# Patient Record
Sex: MALE | Race: WHITE | NOT HISPANIC OR LATINO | Employment: FULL TIME | URBAN - METROPOLITAN AREA
[De-identification: names, ages, dates, MRNs, and addresses within clinical notes are randomized per-mention and may not be internally consistent; named-entity substitution may affect disease eponyms.]

---

## 2018-10-30 ENCOUNTER — TRANSFERRED RECORDS (OUTPATIENT)
Dept: MULTI SPECIALTY CLINIC | Facility: CLINIC | Age: 53
End: 2018-10-30

## 2021-05-27 ENCOUNTER — RECORDS - HEALTHEAST (OUTPATIENT)
Dept: ADMINISTRATIVE | Facility: CLINIC | Age: 56
End: 2021-05-27

## 2021-05-28 ENCOUNTER — RECORDS - HEALTHEAST (OUTPATIENT)
Dept: ADMINISTRATIVE | Facility: CLINIC | Age: 56
End: 2021-05-28

## 2022-08-21 ENCOUNTER — HOSPITAL ENCOUNTER (INPATIENT)
Facility: CLINIC | Age: 57
LOS: 3 days | Discharge: ANOTHER HOSPITAL | End: 2022-08-24
Attending: STUDENT IN AN ORGANIZED HEALTH CARE EDUCATION/TRAINING PROGRAM | Admitting: INTERNAL MEDICINE
Payer: COMMERCIAL

## 2022-08-21 ENCOUNTER — APPOINTMENT (OUTPATIENT)
Dept: CT IMAGING | Facility: CLINIC | Age: 57
End: 2022-08-21
Attending: STUDENT IN AN ORGANIZED HEALTH CARE EDUCATION/TRAINING PROGRAM
Payer: COMMERCIAL

## 2022-08-21 DIAGNOSIS — I10 ESSENTIAL HYPERTENSION: Primary | ICD-10-CM

## 2022-08-21 DIAGNOSIS — J18.9 PNEUMONIA DUE TO INFECTIOUS ORGANISM, UNSPECIFIED LATERALITY, UNSPECIFIED PART OF LUNG: ICD-10-CM

## 2022-08-21 DIAGNOSIS — I21.4 NSTEMI (NON-ST ELEVATED MYOCARDIAL INFARCTION) (H): ICD-10-CM

## 2022-08-21 PROBLEM — J85.2 LUNG ABSCESS (H): Status: ACTIVE | Noted: 2022-08-21

## 2022-08-21 LAB
ALBUMIN SERPL-MCNC: 4.1 G/DL (ref 3.5–5)
ALP SERPL-CCNC: 54 U/L (ref 45–120)
ALT SERPL W P-5'-P-CCNC: 28 U/L (ref 0–45)
ANION GAP SERPL CALCULATED.3IONS-SCNC: 5 MMOL/L (ref 5–18)
AST SERPL W P-5'-P-CCNC: 20 U/L (ref 0–40)
BASOPHILS # BLD AUTO: 0.1 10E3/UL (ref 0–0.2)
BASOPHILS NFR BLD AUTO: 1 %
BILIRUB SERPL-MCNC: 2 MG/DL (ref 0–1)
BNP SERPL-MCNC: 38 PG/ML (ref 0–48)
BUN SERPL-MCNC: 14 MG/DL (ref 8–22)
C REACTIVE PROTEIN LHE: <0.1 MG/DL (ref 0–?)
CALCIUM SERPL-MCNC: 9.5 MG/DL (ref 8.5–10.5)
CHLORIDE BLD-SCNC: 106 MMOL/L (ref 98–107)
CO2 SERPL-SCNC: 28 MMOL/L (ref 22–31)
CREAT SERPL-MCNC: 1.12 MG/DL (ref 0.7–1.3)
EOSINOPHIL # BLD AUTO: 0.1 10E3/UL (ref 0–0.7)
EOSINOPHIL NFR BLD AUTO: 1 %
ERYTHROCYTE [DISTWIDTH] IN BLOOD BY AUTOMATED COUNT: 13.1 % (ref 10–15)
FLUAV RNA SPEC QL NAA+PROBE: NEGATIVE
FLUBV RNA RESP QL NAA+PROBE: NEGATIVE
GFR SERPL CREATININE-BSD FRML MDRD: 77 ML/MIN/1.73M2
GLUCOSE BLD-MCNC: 98 MG/DL (ref 70–125)
HCT VFR BLD AUTO: 44.6 % (ref 40–53)
HGB BLD-MCNC: 16 G/DL (ref 13.3–17.7)
HOLD SPECIMEN: NORMAL
IMM GRANULOCYTES # BLD: 0 10E3/UL
IMM GRANULOCYTES NFR BLD: 0 %
LYMPHOCYTES # BLD AUTO: 1.3 10E3/UL (ref 0.8–5.3)
LYMPHOCYTES NFR BLD AUTO: 20 %
MAGNESIUM SERPL-MCNC: 1.9 MG/DL (ref 1.8–2.6)
MCH RBC QN AUTO: 30.8 PG (ref 26.5–33)
MCHC RBC AUTO-ENTMCNC: 35.9 G/DL (ref 31.5–36.5)
MCV RBC AUTO: 86 FL (ref 78–100)
MONOCYTES # BLD AUTO: 0.6 10E3/UL (ref 0–1.3)
MONOCYTES NFR BLD AUTO: 8 %
NEUTROPHILS # BLD AUTO: 4.5 10E3/UL (ref 1.6–8.3)
NEUTROPHILS NFR BLD AUTO: 70 %
NRBC # BLD AUTO: 0 10E3/UL
NRBC BLD AUTO-RTO: 0 /100
PLATELET # BLD AUTO: 234 10E3/UL (ref 150–450)
POTASSIUM BLD-SCNC: 3.9 MMOL/L (ref 3.5–5)
PROT SERPL-MCNC: 7.3 G/DL (ref 6–8)
RBC # BLD AUTO: 5.2 10E6/UL (ref 4.4–5.9)
RSV RNA SPEC NAA+PROBE: NEGATIVE
SARS-COV-2 RNA RESP QL NAA+PROBE: POSITIVE
SODIUM SERPL-SCNC: 139 MMOL/L (ref 136–145)
TROPONIN I SERPL-MCNC: 0.1 NG/ML (ref 0–0.29)
TROPONIN I SERPL-MCNC: 0.13 NG/ML (ref 0–0.29)
WBC # BLD AUTO: 6.6 10E3/UL (ref 4–11)

## 2022-08-21 PROCEDURE — 85014 HEMATOCRIT: CPT | Performed by: STUDENT IN AN ORGANIZED HEALTH CARE EDUCATION/TRAINING PROGRAM

## 2022-08-21 PROCEDURE — 258N000003 HC RX IP 258 OP 636: Performed by: STUDENT IN AN ORGANIZED HEALTH CARE EDUCATION/TRAINING PROGRAM

## 2022-08-21 PROCEDURE — 258N000003 HC RX IP 258 OP 636: Performed by: INTERNAL MEDICINE

## 2022-08-21 PROCEDURE — C9803 HOPD COVID-19 SPEC COLLECT: HCPCS

## 2022-08-21 PROCEDURE — 210N000002 HC R&B HEART CARE

## 2022-08-21 PROCEDURE — 99223 1ST HOSP IP/OBS HIGH 75: CPT | Performed by: INTERNAL MEDICINE

## 2022-08-21 PROCEDURE — 99285 EMERGENCY DEPT VISIT HI MDM: CPT | Mod: CS,25

## 2022-08-21 PROCEDURE — 36415 COLL VENOUS BLD VENIPUNCTURE: CPT | Performed by: STUDENT IN AN ORGANIZED HEALTH CARE EDUCATION/TRAINING PROGRAM

## 2022-08-21 PROCEDURE — 96365 THER/PROPH/DIAG IV INF INIT: CPT | Mod: 59

## 2022-08-21 PROCEDURE — 71275 CT ANGIOGRAPHY CHEST: CPT

## 2022-08-21 PROCEDURE — 80053 COMPREHEN METABOLIC PANEL: CPT | Performed by: STUDENT IN AN ORGANIZED HEALTH CARE EDUCATION/TRAINING PROGRAM

## 2022-08-21 PROCEDURE — 86140 C-REACTIVE PROTEIN: CPT | Performed by: INTERNAL MEDICINE

## 2022-08-21 PROCEDURE — 250N000011 HC RX IP 250 OP 636: Performed by: STUDENT IN AN ORGANIZED HEALTH CARE EDUCATION/TRAINING PROGRAM

## 2022-08-21 PROCEDURE — 96368 THER/DIAG CONCURRENT INF: CPT

## 2022-08-21 PROCEDURE — 36415 COLL VENOUS BLD VENIPUNCTURE: CPT | Performed by: EMERGENCY MEDICINE

## 2022-08-21 PROCEDURE — 93005 ELECTROCARDIOGRAM TRACING: CPT | Performed by: STUDENT IN AN ORGANIZED HEALTH CARE EDUCATION/TRAINING PROGRAM

## 2022-08-21 PROCEDURE — 83735 ASSAY OF MAGNESIUM: CPT | Performed by: STUDENT IN AN ORGANIZED HEALTH CARE EDUCATION/TRAINING PROGRAM

## 2022-08-21 PROCEDURE — 250N000013 HC RX MED GY IP 250 OP 250 PS 637: Performed by: INTERNAL MEDICINE

## 2022-08-21 PROCEDURE — 250N000013 HC RX MED GY IP 250 OP 250 PS 637: Performed by: STUDENT IN AN ORGANIZED HEALTH CARE EDUCATION/TRAINING PROGRAM

## 2022-08-21 PROCEDURE — 83880 ASSAY OF NATRIURETIC PEPTIDE: CPT | Performed by: STUDENT IN AN ORGANIZED HEALTH CARE EDUCATION/TRAINING PROGRAM

## 2022-08-21 PROCEDURE — 84484 ASSAY OF TROPONIN QUANT: CPT | Performed by: STUDENT IN AN ORGANIZED HEALTH CARE EDUCATION/TRAINING PROGRAM

## 2022-08-21 PROCEDURE — 87637 SARSCOV2&INF A&B&RSV AMP PRB: CPT | Performed by: STUDENT IN AN ORGANIZED HEALTH CARE EDUCATION/TRAINING PROGRAM

## 2022-08-21 RX ORDER — SODIUM CHLORIDE 9 MG/ML
INJECTION, SOLUTION INTRAVENOUS CONTINUOUS
Status: ACTIVE | OUTPATIENT
Start: 2022-08-21 | End: 2022-08-22

## 2022-08-21 RX ORDER — ACETAMINOPHEN 325 MG/1
650 TABLET ORAL EVERY 6 HOURS PRN
Status: DISCONTINUED | OUTPATIENT
Start: 2022-08-21 | End: 2022-08-24 | Stop reason: HOSPADM

## 2022-08-21 RX ORDER — ACETAMINOPHEN 325 MG/1
975 TABLET ORAL ONCE
Status: COMPLETED | OUTPATIENT
Start: 2022-08-21 | End: 2022-08-21

## 2022-08-21 RX ORDER — LIDOCAINE 40 MG/G
CREAM TOPICAL
Status: DISCONTINUED | OUTPATIENT
Start: 2022-08-21 | End: 2022-08-24 | Stop reason: HOSPADM

## 2022-08-21 RX ORDER — LEVOFLOXACIN 5 MG/ML
750 INJECTION, SOLUTION INTRAVENOUS ONCE
Status: COMPLETED | OUTPATIENT
Start: 2022-08-21 | End: 2022-08-21

## 2022-08-21 RX ORDER — LOSARTAN POTASSIUM 25 MG/1
25 TABLET ORAL EVERY MORNING
Status: DISCONTINUED | OUTPATIENT
Start: 2022-08-22 | End: 2022-08-22

## 2022-08-21 RX ORDER — ACETAMINOPHEN 650 MG/1
650 SUPPOSITORY RECTAL EVERY 6 HOURS PRN
Status: DISCONTINUED | OUTPATIENT
Start: 2022-08-21 | End: 2022-08-24 | Stop reason: HOSPADM

## 2022-08-21 RX ORDER — CHLORAL HYDRATE 500 MG
1 CAPSULE ORAL 2 TIMES DAILY WITH MEALS
COMMUNITY

## 2022-08-21 RX ORDER — LOSARTAN POTASSIUM 25 MG/1
25 TABLET ORAL EVERY MORNING
COMMUNITY
End: 2022-09-26

## 2022-08-21 RX ORDER — LEVOFLOXACIN 5 MG/ML
500 INJECTION, SOLUTION INTRAVENOUS EVERY 24 HOURS
Status: DISCONTINUED | OUTPATIENT
Start: 2022-08-22 | End: 2022-08-24

## 2022-08-21 RX ORDER — IBUPROFEN 600 MG/1
600 TABLET, FILM COATED ORAL ONCE
Status: COMPLETED | OUTPATIENT
Start: 2022-08-21 | End: 2022-08-21

## 2022-08-21 RX ORDER — CHOLECALCIFEROL (VITAMIN D3) 50 MCG
50 TABLET ORAL EVERY MORNING
COMMUNITY

## 2022-08-21 RX ORDER — IOPAMIDOL 755 MG/ML
100 INJECTION, SOLUTION INTRAVASCULAR ONCE
Status: COMPLETED | OUTPATIENT
Start: 2022-08-21 | End: 2022-08-21

## 2022-08-21 RX ORDER — IBUPROFEN 200 MG
400 TABLET ORAL AT BEDTIME
Status: ON HOLD | COMMUNITY
End: 2022-08-25

## 2022-08-21 RX ORDER — HYDRALAZINE HYDROCHLORIDE 20 MG/ML
10 INJECTION INTRAMUSCULAR; INTRAVENOUS EVERY 6 HOURS PRN
Status: DISCONTINUED | OUTPATIENT
Start: 2022-08-21 | End: 2022-08-24 | Stop reason: HOSPADM

## 2022-08-21 RX ORDER — ONDANSETRON 4 MG/1
4 TABLET, ORALLY DISINTEGRATING ORAL EVERY 6 HOURS PRN
Status: DISCONTINUED | OUTPATIENT
Start: 2022-08-21 | End: 2022-08-24 | Stop reason: HOSPADM

## 2022-08-21 RX ORDER — ONDANSETRON 2 MG/ML
4 INJECTION INTRAMUSCULAR; INTRAVENOUS EVERY 6 HOURS PRN
Status: DISCONTINUED | OUTPATIENT
Start: 2022-08-21 | End: 2022-08-24 | Stop reason: HOSPADM

## 2022-08-21 RX ADMIN — VANCOMYCIN HYDROCHLORIDE 2000 MG: 5 INJECTION, POWDER, LYOPHILIZED, FOR SOLUTION INTRAVENOUS at 19:47

## 2022-08-21 RX ADMIN — IBUPROFEN 600 MG: 600 TABLET ORAL at 20:03

## 2022-08-21 RX ADMIN — IOPAMIDOL 100 ML: 755 INJECTION, SOLUTION INTRAVENOUS at 18:13

## 2022-08-21 RX ADMIN — LEVOFLOXACIN 750 MG: 5 INJECTION, SOLUTION INTRAVENOUS at 19:40

## 2022-08-21 RX ADMIN — SODIUM CHLORIDE: 9 INJECTION, SOLUTION INTRAVENOUS at 22:39

## 2022-08-21 RX ADMIN — ACETAMINOPHEN 975 MG: 325 TABLET ORAL at 20:03

## 2022-08-21 RX ADMIN — Medication 1 MG: at 22:48

## 2022-08-21 ASSESSMENT — ACTIVITIES OF DAILY LIVING (ADL)
ADLS_ACUITY_SCORE: 35
CONCENTRATING,_REMEMBERING_OR_MAKING_DECISIONS_DIFFICULTY: NO
WALKING_OR_CLIMBING_STAIRS_DIFFICULTY: NO
CHANGE_IN_FUNCTIONAL_STATUS_SINCE_ONSET_OF_CURRENT_ILLNESS/INJURY: NO
ADLS_ACUITY_SCORE: 20
TOILETING_ISSUES: NO
WEAR_GLASSES_OR_BLIND: YES
ADLS_ACUITY_SCORE: 35
DIFFICULTY_EATING/SWALLOWING: NO
DOING_ERRANDS_INDEPENDENTLY_DIFFICULTY: NO
ADLS_ACUITY_SCORE: 35
DEPENDENT_IADLS:: INDEPENDENT
DRESSING/BATHING_DIFFICULTY: NO
FALL_HISTORY_WITHIN_LAST_SIX_MONTHS: NO

## 2022-08-21 NOTE — ED PROVIDER NOTES
Emergency Department Encounter         FINAL IMPRESSION:  Lung abscess, chest pain        ED COURSE AND MEDICAL DECISION MAKING       ED Course as of 08/21/22 1933   Sun Aug 21, 2022   1627 I met the patient and performed my initial interview and exam.   1636 Patient is a obese 57-year-old hypertensive male here with chest discomfort.  Patient states the past 3 to 5 days he had increasing chest discomfort with exertion.  He was seen in urgent care yesterday because he had a few episodes that woke him in the middle of the night.  States yesterday he was diagnosed with possibly GERD/reflux.  States today while walking back from his daughter's apartment to the car he had another episode of substernal chest pressure where he felt nauseous, his wife stated that he did not look well, and is soon as he stopped and rested in the car his pain went away.  States this is benign, and pattern.  They are currently moving and states each time he lifts a box and walks into the house he has chest discomfort and when he sits down and rests it goes away.  No fevers, chills, vomiting or diarrhea.  No abdominal pain.  States today the pain went into his back and down his arms.  On arrival here he is asymptomatic.  Hypertensive.  EKG nonischemic.  Heart score is 3 or 4 depending on moderate versus highly suspicious story.  Physical examination otherwise is unremarkable.   1638 Recent long car ride.  Plan for CT chest as well as cardiopulmonary evaluation.   1638 EKG is sinus rhythm 63 no signs acute ischemia, no inversions no depressions QTC is 427, no old EKGs for comparison.   1708 I rechecked and updated the patient on results and plan for admission.    1905 I rechecked and updated the patient on CT results.    1910 I spoke with     1913 Patient with cavitary lesion.  Antibiotics added on.  TB in the differential isolation was initiated.  Discussed case with hospitalist.  Coccidiomycosis also in the differential.  Second troponin  added.  Patient admitted as a full admission to cardiac telemetry.                EKG:  Sinus rhythm 63 no signs acute ischemia, no inversions no depressions QTC is 427, no old EKGs for comparison.    At the conclusion of the encounter I discussed the results of all the tests and the disposition. The questions were answered. The patient or family acknowledged understanding and was agreeable with the care plan.          MEDICATIONS GIVEN IN THE EMERGENCY DEPARTMENT:  Medications   levofloxacin (LEVAQUIN) infusion 750 mg (has no administration in time range)   iopamidol (ISOVUE-370) solution 100 mL (100 mLs Intravenous Given 8/21/22 1813)       NEW PRESCRIPTIONS STARTED AT TODAY'S ED VISIT:  New Prescriptions    No medications on file       HPI     Patient information obtained from: patient, patient's mother    Use of Interpretor: N/A     Jose Raul Larsen is a 57 year old male with a pertinent history of HTN who presents to this ED by private car for evaluation of chest pain.     Patient presents with exertional chest pressure over the past 3-5 days. Pain occassionally radiates down bilateral arms to his elbows. He is currently moving and has noticed chest pressure very time he lifts a box and walks into the house. Pressure revolves after he rests. Today he had an episode of much more severe pain after walking from his daughter's apartment to his car. Wife noted he didn't look well. No associated nausea, sweats, or lightheadedness. Pain resoled after sitting down. Over the past few nights, the pain has hindered his ability to fall asleep and has woke him up multiple times at night. Pain resolves after sitting up on the side of the bed. He also mentions an occasional lingering cough from his COVID-19 infection 2.5 weeks ago. He experiences a lot of chest pressure when he coughs.  Pain is not correlated with eating. Seen at urgent care yesterday and diagnosed with possible GERD. Here in ED, patient is asymptomatic and  "feels well. Patient mentions he drove from [place] to [place] in June. No family history of premature heart disease. Medical problems include hypertension. Daily medications include losartan. Surgical history of appendectomy and hernia repair. He has yearly physicals. No past stress tests. Denies fever, chills, vomiting, diarrhea.    REVIEW OF SYSTEMS:  Review of Systems   Constitutional: Negative for diaphoresis, fever, malaise  HEENT: Negative runny nose, sore throat, ear pain, neck pain  Respiratory: Negative for shortness of breath, cough, congestion  Cardiovascular: Positive for chest pain. Negative for leg edema  Gastrointestinal: Negative for abdominal distention, abdominal pain, constipation, vomiting, nausea, diarrhea  Genitourinary: Negative for dysuria and hematuria.   Integument: Negative for rash, skin breakdown  Neurological: Negative for lightheadedness, paresthesias, weakness, headache.  Musculoskeletal: Negative for joint pain, joint swelling      All other systems reviewed and are negative.          MEDICAL HISTORY     History reviewed. No pertinent past medical history.    Past Surgical History:   Procedure Laterality Date     [facility] APPENDECTOMY      Description: Appendectomy;  Recorded: 02/19/2010;            fish oil-omega-3 fatty acids 1000 MG capsule  ibuprofen (ADVIL/MOTRIN) 200 MG tablet  losartan (COZAAR) 25 MG tablet  Misc Natural Products (OSTEO BI-FLEX JOINT SHIELD) TABS  vitamin D3 (CHOLECALCIFEROL) 50 mcg (2000 units) tablet            PHYSICAL EXAM     BP (!) 163/92   Pulse 64   Temp 98  F (36.7  C) (Temporal)   Resp 20   Ht 1.803 m (5' 11\")   Wt 102.1 kg (225 lb)   SpO2 98%   BMI 31.38 kg/m        PHYSICAL EXAM:     General: Patient appears well, nontoxic, comfortable  HEENT: Moist mucous membranes, no tongue swelling.  No head trauma.  No midline neck pain.  Cardiovascular: Normal rate, normal rhythm, no extremity edema.  No appreciable murmur.  Respiratory: No signs of " respiratory distress, lungs are clear to auscultation bilaterally with no wheezes rhonchi or rales.  Abdominal: Soft, nontender, nondistended, no palpable masses, no guarding, no rebound  Musculoskeletal: Full range of motion of joints, no deformities appreciated.  Neurological: Alert and oriented, grossly neurologically intact.  Psychological: Normal affect and mood.  Integument: No rashes appreciated      RESULTS       Labs Ordered and Resulted from Time of ED Arrival to Time of ED Departure   COMPREHENSIVE METABOLIC PANEL - Abnormal       Result Value    Sodium 139      Potassium 3.9      Chloride 106      Carbon Dioxide (CO2) 28      Anion Gap 5      Urea Nitrogen 14      Creatinine 1.12      Calcium 9.5      Glucose 98      Alkaline Phosphatase 54      AST 20      ALT 28      Protein Total 7.3      Albumin 4.1      Bilirubin Total 2.0 (*)     GFR Estimate 77     TROPONIN I - Normal    Troponin I 0.10     MAGNESIUM - Normal    Magnesium 1.9     B-TYPE NATRIURETIC PEPTIDE ([facility] ONLY) - Normal    BNP 38     CBC WITH PLATELETS AND DIFFERENTIAL    WBC Count 6.6      RBC Count 5.20      Hemoglobin 16.0      Hematocrit 44.6      MCV 86      MCH 30.8      MCHC 35.9      RDW 13.1      Platelet Count 234      % Neutrophils 70      % Lymphocytes 20      % Monocytes 8      % Eosinophils 1      % Basophils 1      % Immature Granulocytes 0      NRBCs per 100 WBC 0      Absolute Neutrophils 4.5      Absolute Lymphocytes 1.3      Absolute Monocytes 0.6      Absolute Eosinophils 0.1      Absolute Basophils 0.1      Absolute Immature Granulocytes 0.0      Absolute NRBCs 0.0     TROPONIN I   INFLUENZA A/B & SARS-COV2 PCR MULTIPLEX       CT Chest Pulmonary Embolism w Contrast   Preliminary Result   IMPRESSION:   1.  No pulmonary embolism.      2.  1.6 cm cavitary lesion within the periphery of the right upper lobe with multiple small perilesional nodules. Intermediate uniform wall thickness with some fluid within the cavity.  This most likely is infectious/inflammatory and fungal diseases as    well as tuberculosis would be included in the differential diagnosis.      NOTE: ABNORMAL REPORT      1.  THE DICTATION ABOVE DESCRIBES AN ABNORMALITY FOR WHICH FOLLOW-UP IS NEEDED.                         PROCEDURES:  Procedures:  Procedures       I, Elyse Johnson am serving as a scribe to document services personally performed by Yandel Cheng DO, based on my observations and the provider's statements to me.  I, Yandel Cheng DO, attest that Elyse Johnson is acting in a scribe capacity, has observed my performance of the services and has documented them in accordance with my direction.    Yandel Cheng DO  Emergency Medicine  [facility] EMERGENCY ROOM     Yandel Cheng DO  08/21/22 1935

## 2022-08-21 NOTE — ED NOTES
Pt complains of 1 week of chest pain.  Denies shortness of breath and nausea.  States it worsens with exertion

## 2022-08-21 NOTE — ED TRIAGE NOTES
Patient is here with mid chest pain that radiates to his upper chest. Worse with exertion better at rest. The pain wakes him up at night for the past two nights. This has been going on for one week. Denies shortness of breath, diaphoresis and nausea.

## 2022-08-22 ENCOUNTER — APPOINTMENT (OUTPATIENT)
Dept: CARDIOLOGY | Facility: CLINIC | Age: 57
End: 2022-08-22
Attending: INTERNAL MEDICINE
Payer: COMMERCIAL

## 2022-08-22 ENCOUNTER — APPOINTMENT (OUTPATIENT)
Dept: CT IMAGING | Facility: CLINIC | Age: 57
End: 2022-08-22
Attending: INTERNAL MEDICINE
Payer: COMMERCIAL

## 2022-08-22 LAB
ANION GAP SERPL CALCULATED.3IONS-SCNC: 3 MMOL/L (ref 5–18)
ATRIAL RATE - MUSE: 63 BPM
ATRIAL RATE - MUSE: 65 BPM
BUN SERPL-MCNC: 13 MG/DL (ref 8–22)
CALCIUM SERPL-MCNC: 8.9 MG/DL (ref 8.5–10.5)
CHLORIDE BLD-SCNC: 107 MMOL/L (ref 98–107)
CO2 SERPL-SCNC: 26 MMOL/L (ref 22–31)
CREAT BLD-MCNC: 1.2 MG/DL (ref 0.7–1.3)
CREAT SERPL-MCNC: 0.98 MG/DL (ref 0.7–1.3)
DIASTOLIC BLOOD PRESSURE - MUSE: 126 MMHG
DIASTOLIC BLOOD PRESSURE - MUSE: NORMAL MMHG
ERYTHROCYTE [DISTWIDTH] IN BLOOD BY AUTOMATED COUNT: 12.9 % (ref 10–15)
GFR SERPL CREATININE-BSD FRML MDRD: 90 ML/MIN/1.73M2
GFR SERPL CREATININE-BSD FRML MDRD: >60 ML/MIN/1.73M2
GLUCOSE BLD-MCNC: 100 MG/DL (ref 70–125)
HCT VFR BLD AUTO: 42.1 % (ref 40–53)
HGB BLD-MCNC: 15.4 G/DL (ref 13.3–17.7)
INTERPRETATION ECG - MUSE: NORMAL
INTERPRETATION ECG - MUSE: NORMAL
LVEF ECHO: NORMAL
MCH RBC QN AUTO: 31.2 PG (ref 26.5–33)
MCHC RBC AUTO-ENTMCNC: 36.6 G/DL (ref 31.5–36.5)
MCV RBC AUTO: 85 FL (ref 78–100)
P AXIS - MUSE: -3 DEGREES
P AXIS - MUSE: 21 DEGREES
PLATELET # BLD AUTO: 204 10E3/UL (ref 150–450)
POTASSIUM BLD-SCNC: 3.9 MMOL/L (ref 3.5–5)
PR INTERVAL - MUSE: 164 MS
PR INTERVAL - MUSE: 172 MS
PROCALCITONIN SERPL-MCNC: 0.03 NG/ML (ref 0–0.49)
QRS DURATION - MUSE: 88 MS
QRS DURATION - MUSE: 92 MS
QT - MUSE: 418 MS
QT - MUSE: 436 MS
QTC - MUSE: 427 MS
QTC - MUSE: 453 MS
R AXIS - MUSE: -4 DEGREES
R AXIS - MUSE: 1 DEGREES
RBC # BLD AUTO: 4.94 10E6/UL (ref 4.4–5.9)
SODIUM SERPL-SCNC: 136 MMOL/L (ref 136–145)
SYSTOLIC BLOOD PRESSURE - MUSE: 198 MMHG
SYSTOLIC BLOOD PRESSURE - MUSE: NORMAL MMHG
T AXIS - MUSE: 13 DEGREES
T AXIS - MUSE: 15 DEGREES
TROPONIN I SERPL-MCNC: 0.12 NG/ML (ref 0–0.29)
TROPONIN I SERPL-MCNC: 0.26 NG/ML (ref 0–0.29)
TROPONIN I SERPL-MCNC: 0.46 NG/ML (ref 0–0.29)
VENTRICULAR RATE- MUSE: 63 BPM
VENTRICULAR RATE- MUSE: 65 BPM
WBC # BLD AUTO: 7.7 10E3/UL (ref 4–11)

## 2022-08-22 PROCEDURE — 93005 ELECTROCARDIOGRAM TRACING: CPT

## 2022-08-22 PROCEDURE — 87449 NOS EACH ORGANISM AG IA: CPT | Performed by: INTERNAL MEDICINE

## 2022-08-22 PROCEDURE — 84484 ASSAY OF TROPONIN QUANT: CPT | Performed by: INTERNAL MEDICINE

## 2022-08-22 PROCEDURE — 99233 SBSQ HOSP IP/OBS HIGH 50: CPT | Performed by: INTERNAL MEDICINE

## 2022-08-22 PROCEDURE — 93306 TTE W/DOPPLER COMPLETE: CPT | Mod: 26 | Performed by: INTERNAL MEDICINE

## 2022-08-22 PROCEDURE — 86038 ANTINUCLEAR ANTIBODIES: CPT | Performed by: INTERNAL MEDICINE

## 2022-08-22 PROCEDURE — 250N000011 HC RX IP 250 OP 636: Performed by: INTERNAL MEDICINE

## 2022-08-22 PROCEDURE — 93005 ELECTROCARDIOGRAM TRACING: CPT | Performed by: INTERNAL MEDICINE

## 2022-08-22 PROCEDURE — 94640 AIRWAY INHALATION TREATMENT: CPT

## 2022-08-22 PROCEDURE — 94640 AIRWAY INHALATION TREATMENT: CPT | Mod: 76

## 2022-08-22 PROCEDURE — 93010 ELECTROCARDIOGRAM REPORT: CPT | Performed by: GENERAL ACUTE CARE HOSPITAL

## 2022-08-22 PROCEDURE — 210N000002 HC R&B HEART CARE

## 2022-08-22 PROCEDURE — 86612 BLASTOMYCES ANTIBODY: CPT | Performed by: INTERNAL MEDICINE

## 2022-08-22 PROCEDURE — 36415 COLL VENOUS BLD VENIPUNCTURE: CPT | Performed by: INTERNAL MEDICINE

## 2022-08-22 PROCEDURE — 87305 ASPERGILLUS AG IA: CPT | Performed by: INTERNAL MEDICINE

## 2022-08-22 PROCEDURE — 99255 IP/OBS CONSLTJ NEW/EST HI 80: CPT | Mod: GC | Performed by: INTERNAL MEDICINE

## 2022-08-22 PROCEDURE — 84145 PROCALCITONIN (PCT): CPT | Performed by: INTERNAL MEDICINE

## 2022-08-22 PROCEDURE — 87385 HISTOPLASMA CAPSUL AG IA: CPT | Performed by: INTERNAL MEDICINE

## 2022-08-22 PROCEDURE — 99221 1ST HOSP IP/OBS SF/LOW 40: CPT | Performed by: INTERNAL MEDICINE

## 2022-08-22 PROCEDURE — 86698 HISTOPLASMA ANTIBODY: CPT | Performed by: INTERNAL MEDICINE

## 2022-08-22 PROCEDURE — 75574 CT ANGIO HRT W/3D IMAGE: CPT | Mod: 26 | Performed by: INTERNAL MEDICINE

## 2022-08-22 PROCEDURE — 250N000009 HC RX 250: Performed by: INTERNAL MEDICINE

## 2022-08-22 PROCEDURE — 999N000157 HC STATISTIC RCP TIME EA 10 MIN

## 2022-08-22 PROCEDURE — 85018 HEMOGLOBIN: CPT | Performed by: INTERNAL MEDICINE

## 2022-08-22 PROCEDURE — 0504T CT FFR: CPT | Performed by: INTERNAL MEDICINE

## 2022-08-22 PROCEDURE — 99221 1ST HOSP IP/OBS SF/LOW 40: CPT | Mod: 95 | Performed by: INTERNAL MEDICINE

## 2022-08-22 PROCEDURE — 87389 HIV-1 AG W/HIV-1&-2 AB AG IA: CPT | Performed by: INTERNAL MEDICINE

## 2022-08-22 PROCEDURE — 0503T CT FFR: CPT

## 2022-08-22 PROCEDURE — 250N000013 HC RX MED GY IP 250 OP 250 PS 637: Performed by: INTERNAL MEDICINE

## 2022-08-22 PROCEDURE — 86036 ANCA SCREEN EACH ANTIBODY: CPT | Performed by: INTERNAL MEDICINE

## 2022-08-22 PROCEDURE — 75574 CT ANGIO HRT W/3D IMAGE: CPT

## 2022-08-22 PROCEDURE — 80048 BASIC METABOLIC PNL TOTAL CA: CPT | Performed by: INTERNAL MEDICINE

## 2022-08-22 PROCEDURE — 86635 COCCIDIOIDES ANTIBODY: CPT | Performed by: INTERNAL MEDICINE

## 2022-08-22 PROCEDURE — 86606 ASPERGILLUS ANTIBODY: CPT | Performed by: INTERNAL MEDICINE

## 2022-08-22 PROCEDURE — 82565 ASSAY OF CREATININE: CPT

## 2022-08-22 PROCEDURE — 93306 TTE W/DOPPLER COMPLETE: CPT

## 2022-08-22 RX ORDER — NITROGLYCERIN 0.4 MG/1
0.4 TABLET SUBLINGUAL ONCE
Status: COMPLETED | OUTPATIENT
Start: 2022-08-22 | End: 2022-08-22

## 2022-08-22 RX ORDER — METOPROLOL TARTRATE 50 MG
50-100 TABLET ORAL
Status: DISCONTINUED | OUTPATIENT
Start: 2022-08-22 | End: 2022-08-24 | Stop reason: HOSPADM

## 2022-08-22 RX ORDER — LOSARTAN POTASSIUM 25 MG/1
25 TABLET ORAL ONCE
Status: COMPLETED | OUTPATIENT
Start: 2022-08-22 | End: 2022-08-22

## 2022-08-22 RX ORDER — IOPAMIDOL 755 MG/ML
100 INJECTION, SOLUTION INTRAVASCULAR ONCE
Status: COMPLETED | OUTPATIENT
Start: 2022-08-22 | End: 2022-08-22

## 2022-08-22 RX ORDER — SODIUM CHLORIDE FOR INHALATION 3 %
3 VIAL, NEBULIZER (ML) INHALATION EVERY 8 HOURS
Status: COMPLETED | OUTPATIENT
Start: 2022-08-22 | End: 2022-08-23

## 2022-08-22 RX ORDER — ASPIRIN 81 MG/1
324 TABLET, CHEWABLE ORAL DAILY
Status: DISCONTINUED | OUTPATIENT
Start: 2022-08-22 | End: 2022-08-24 | Stop reason: HOSPADM

## 2022-08-22 RX ORDER — LOSARTAN POTASSIUM 50 MG/1
50 TABLET ORAL EVERY MORNING
Status: DISCONTINUED | OUTPATIENT
Start: 2022-08-23 | End: 2022-08-24 | Stop reason: HOSPADM

## 2022-08-22 RX ADMIN — NITROGLYCERIN 0.4 MG: 0.4 TABLET SUBLINGUAL at 14:09

## 2022-08-22 RX ADMIN — LEVOFLOXACIN 500 MG: 5 INJECTION, SOLUTION INTRAVENOUS at 00:20

## 2022-08-22 RX ADMIN — IOPAMIDOL 100 ML: 755 INJECTION, SOLUTION INTRAVENOUS at 14:10

## 2022-08-22 RX ADMIN — ASPIRIN 81 MG CHEWABLE TABLET 324 MG: 81 TABLET CHEWABLE at 13:21

## 2022-08-22 RX ADMIN — SODIUM CHLORIDE SOLN NEBU 3% 3 ML: 3 NEBU SOLN at 20:30

## 2022-08-22 RX ADMIN — LOSARTAN POTASSIUM 25 MG: 25 TABLET, FILM COATED ORAL at 08:48

## 2022-08-22 RX ADMIN — SODIUM CHLORIDE SOLN NEBU 3% 3 ML: 3 NEBU SOLN at 23:57

## 2022-08-22 RX ADMIN — Medication 1500 MG: at 13:23

## 2022-08-22 RX ADMIN — LOSARTAN POTASSIUM 25 MG: 25 TABLET, FILM COATED ORAL at 12:34

## 2022-08-22 ASSESSMENT — ACTIVITIES OF DAILY LIVING (ADL)
ADLS_ACUITY_SCORE: 20

## 2022-08-22 NOTE — PROGRESS NOTES
Patient appears to be COVID recovered status (initial positive COVID test 8/5/22).    .MILD TO MODERATE ILLNESS WHO ARE NOT SEVERELY IMMUNOCOMPROMISED    Following criteria for patients who are not  immunocompromised who have mild to moderate     COVID-19 illness, patient meets criteria for discontinuation of Special Precautions:    FOR SYMPTOMATIC - 10 days following symptom onset (day 0 is date of symptom onset),     >24 hr fever free without fever-reducing meds, AND substantial improvement in COVID-19 symptoms.    FOR ASYMPTOMATIC - 10 days from positive test (day 0 is the positive test date) AND no COVID-19 symptom development in 10-day timeframe.    Special Precautions discontinued Aug. 22, 2022. Patient is considered recovered for 90 days from symptom onset.    .8/22/2022  .Eleonora Rose, Infection Prevention

## 2022-08-22 NOTE — CONSULTS
Consultation - Infectious Disease  [facility]  Jose Raul Larsen,  1965, MRN [id number]    Admitting Dx: Lung abscess (H) [J85.2]    PCP: System, Provider Not In, None       ASSESSMENT       Active Problems:    Lung abscess (H)     Jose Raul Larsen is a 57 year old male with a past medical history of hypertension, recent COVID-19 infection secondhand smoking exposure presented with exertional chest pressure who was admitted on 2022 for1.6 cm cavitary right lung lesion.       PLAN     1. Cavitary RUL lesion   1.6 cm cavitary lesion in periphery of the right upper lobe with multiple small perilesional nodules, uniform thickness within the cavity. differential includes tuberculosis, histoplasmosis, coccidioidosis, aspergillosis, blastomycosis. Less likely  covid-19 pneumonia as image findings not supportive of diagnosis. possible bacterial versus fungal superimposed on covid-19 infection; workup pending. Patient has risk factors for coccidioidosis given recent travel from endemic areas. unkown risk factors for TB; AFB pending; if negative, patient may come off precautions and undergo further work up outpatient. Received levaquin and vanc x 2 days. Agree with current antibuotics until respiratory aerobic culture and gram stain results.   -respiratory Aerobic bacterial culture and gram stain    -AFB culture/stain  -1,3 Beta D glucan fungitell   -ANCA IgG   -aspergilus galactomannan antigen   -blastomyces   -coccidiodes Agn   -fungal antibody   -histoplasma capsulatum Agn   -HIV antigen/antibody combo   -MRSA nasal swab       Thank you for this consult. Will follow.    Jonathan Etienne DO PGY2  [facility]         ===========================================      Chief Complaint   <principal problem not specified>     Exertional chest pain     HPI     We have been requested by Lesa Moore MD to evaluate Jose Raul Larsen for the above.    History obtained by patient    Jose Raul Larsen  "is a 57 year old male with a past medical history of hypertension, recent COVID-19 infection secondhand smoking exposure presented with exertional chest pressure who was admitted on 8/21/2022 for1.6 cm cavitary right lung lesion.     Jose Raul states he feels well this morning. He has not experienced chest pain/pressure since he was admitted.   Patient reports he tested positive for covid-19 on 8/5. He had mild symptoms for a couple of days while he self-isolated. He does not currently endorse any respiratory symptoms at this time.       Review of Systems   CONSTITUTIONAL:  No fevers or chills  EYES: negative for icterus  ENT:  negative for oral lesions, hearing loss, tinnitus and sore throat  RESPIRATORY:  negative for cough and dyspnea  CARDIOVASCULAR:  negative for chest pain, palpitations  GASTROINTESTINAL:  negative for nausea, vomiting, diarrhea and constipation  GENITOURINARY:  negative for dysuria  HEME:  No easy bruising/bleeding  INTEGUMENT:  negative for rash and pruritus  MSK: Negative for arthritis or arthralgias  NEURO:  Negative for headache      Medical History  History reviewed. No pertinent past medical history. Surgical History  He  has a past surgical history that includes APPENDECTOMY.     Social History  Reviewed, and he    Social History     Social History Narrative     Not on file     Family History  family history is not on file.  family history reviewed and is not pertinent to the presenting problem.            Allergies     Allergies   Allergen Reactions     Cephalexin Hives and Rash         Antibiotics       Previous:        Physical Exam     Temp:  [97.8  F (36.6  C)-98.5  F (36.9  C)] 97.9  F (36.6  C)  Pulse:  [62-79] 64  Resp:  [13-49] 18  BP: (132-211)/() 167/106  SpO2:  [95 %-100 %] 95 %    BP (!) 167/106 (BP Location: Right arm)   Pulse 64   Temp 97.9  F (36.6  C) (Oral)   Resp 18   Ht 1.803 m (5' 11\")   Wt 101.6 kg (224 lb)   SpO2 95%   BMI 31.24 kg/m      GENERAL:  " well-developed, well-nourished, sitting in bed in no acute distress.   HENT:  Head is normocephalic, atraumatic. Oropharynx is moist without exudates or ulcers.  EYES:  Eyes have anicteric sclerae without conjunctival injection or stigmata of endocarditis.   NECK:  Supple.  LUNGS: diminished air sounds in the right upper lung field  CARDIOVASCULAR:  Regular rate and rhythm with no murmurs, gallops or rubs.  ABDOMEN:  Normal bowel sounds, soft, nontender. No appreciable hepatosplenomegaly  EXT: Extremities warm and without edema.  SKIN:  No acute rashes. Line is in place without any surrounding erythema. No stigmata of endocarditis.  NEUROLOGIC:  Grossly nonfocal.      Cultures         Laboratory results     Recent Labs   Lab 22  0436 22  1613   WBC 7.7 6.6   HGB 15.4 16.0    234       Recent Labs   Lab 22  0436 22  1613    139   CO2 26 28   BUN 13 14   ALBUMIN  --  4.1   ALKPHOS  --  54   ALT  --  28   AST  --  20       Recent Labs   Lab 22  1938   CRP <0.1           Imaging   Radiology results reviewed    Echocardiogram Complete    Result Date: 2022  [id number]^JERMAN^[place] [address]  Name: ZINA CORREA MRN: [id number] : 1965 Study Date: 2022 09:06 AM Age: 57 yrs Gender: Male Patient Location: [facility] Reason For Study: Chest Pressure Ordering Physician: CAMILA STOLL Performed By: CLARISA  BSA: 2.2 m2 Height: 71 in Weight: 224 lb ______________________________________________________________________________ Procedure Complete Portable Echo Adult. Adequate quality two-dimensional was performed and interpreted. Adequate quality color and spectral Doppler were performed and interpreted. There is no comparison study available. ______________________________________________________________________________ Interpretation Summary  The left ventricle is normal in size with mild concentric left  ventricular hypertrophy. Left ventricular function is normal.The ejection fraction is 60-65%. No regional wall motion abnormalities noted. Normal right ventricle size and systolic function. No valve disease identified. There is no comparison study available. ______________________________________________________________________________ I      WMSI = 1.00     % Normal = 100  X - Cannot   0 -                      (2) - Mildly 2 -          Segments  Size Interpret    Hyperkinetic 1 - Normal  Hypokinetic  Hypokinetic  1-2     small                                                    7 -          3-5    moderate 3 - Akinetic 4 -          5 -         6 - Akinetic Dyskinetic   6-14    large              Dyskinetic   Aneurysmal  w/scar       w/scar       15-16   diffuse  Left Ventricle The left ventricle is normal in size. Left ventricular function is normal.The ejection fraction is 60-65%. There is mild concentric left ventricular hypertrophy. Left ventricular diastolic function is normal. No regional wall motion abnormalities noted.  Right Ventricle Normal right ventricle size and systolic function.  Atria Normal left atrial size. Right atrial size is normal. There is no color Doppler evidence of an atrial shunt.  Mitral Valve Mitral valve leaflets appear normal. There is no evidence of mitral stenosis or clinically significant mitral regurgitation.  Tricuspid Valve Tricuspid valve leaflets appear normal. There is no evidence of tricuspid stenosis or clinically significant tricuspid regurgitation. Right ventricular systolic pressure could not be approximated due to inadequate tricuspid regurgitation.  Aortic Valve The aortic valve is trileaflet. Aortic valve leaflets appear normal. There is no evidence of aortic stenosis or clinically significant aortic regurgitation.  Pulmonic Valve The pulmonic valve is not well seen, but is grossly normal. This degree of valvular regurgitation is within normal limits. There is trace  pulmonic valvular regurgitation.  Vessels The aorta root is normal. Normal size ascending aorta. IVC diameter <2.1 cm collapsing >50% with sniff suggests a normal RA pressure of 3 mmHg.  Pericardium There is no pericardial effusion.  Rhythm Sinus rhythm was noted. ______________________________________________________________________________ MMode/2D Measurements & Calculations IVSd: 1.0 cm  LVIDd: 4.7 cm LVIDs: 3.1 cm LVPWd: 1.1 cm FS: 33.5 % LV mass(C)d: 176.9 grams LV mass(C)dI: 79.9 grams/m2 Ao root diam: 2.7 cm LA dimension: 2.4 cm asc Aorta Diam: 2.7 cm LA/Ao: 0.89 LVOT diam: 2.1 cm LVOT area: 3.4 cm2 LA Volume Indexed (AL/bp): 12.7 ml/m2 RWT: 0.46  Time Measurements MM HR: 62.0 BPM  Doppler Measurements & Calculations MV E max amos: 46.6 cm/sec MV A max amos: 65.0 cm/sec MV E/A: 0.72 MV dec time: 0.32 sec LV V1 max PG: 3.0 mmHg LV V1 max: 87.3 cm/sec LV V1 VTI: 20.0 cm SV(LVOT): 67.4 ml SI(LVOT): 30.4 ml/m2 PA acc time: 0.13 sec E/E' av.4 Lateral E/e': 6.8 Medial E/e': 7.9  ______________________________________________________________________________ Report approved by: Roxie Ruiz 2022 11:55 AM       CT Chest Pulmonary Embolism w Contrast    Result Date: 2022  EXAM: CT CHEST PULMONARY EMBOLISM W CONTRAST LOCATION: [facility] DATE/TIME: 2022 6:24 PM INDICATION: Dyspnea on exertion. Chest pain. COMPARISON: None. TECHNIQUE: CT chest pulmonary angiogram during arterial phase injection of IV contrast. Multiplanar reformats and MIP reconstructions were performed. Dose reduction techniques were used. CONTRAST: 100ml Isovue 370 FINDINGS: ANGIOGRAM CHEST: Pulmonary arteries are normal caliber and negative for pulmonary emboli. Thoracic aorta is negative for dissection. No CT evidence of right heart strain. LUNGS AND PLEURA: 1.6 cm cavitary lesion within the periphery of the right upper lobe with multiple small perilesional nodules. Intermediate uniform wall with  some fluid in the cavity. Findings most likely inflammatory. Differential diagnosis would include fungal diseases and such entities as tuberculosis. MEDIASTINUM/AXILLAE: Normal. CORONARY ARTERY CALCIFICATION: Mild. UPPER ABDOMEN: Normal. MUSCULOSKELETAL: Normal.     IMPRESSION: 1.  No pulmonary embolism. 2.  1.6 cm cavitary lesion within the periphery of the right upper lobe with multiple small perilesional nodules. Intermediate uniform wall thickness with some fluid within the cavity. This most likely is infectious/inflammatory and fungal diseases as well as tuberculosis would be included in the differential diagnosis. NOTE: ABNORMAL REPORT 1.  THE DICTATION ABOVE DESCRIBES AN ABNORMALITY FOR WHICH FOLLOW-UP IS NEEDED.     ===============================    Attestation:  This patient has been seen and evaluated by me, Brian East MD.  Discussed with the Resident and agree with the findings and recommendations in this note.     I have reviewed today's Medications, Vital Signs, Labs and Imaging. Recommendations discussed with pulmonary service.     58 yo man with pmh of hypertension.  Admitted with chest pain. CT chest found to have 1.6 cm cavitary lesion on the right.  No shortness of breath. He does have chronic dry cough. This had improved in the past with allergy meds (doesn't remember which one), then returned when he completed the medication. Denies fevers or night sweats. Denies weight loss. No senior living. No extended foreign travel.    About 2 years ago (Fall 2020) had bad pneumonia while in [place]. Tested negative for COVID-19. Treated with antibiotics and told not to come in (due to pandemic). Doesn't remember if he ever had a chest x-ray. Symptoms lasted for about a month.    Tested COVID-19 positive on 8/5 on home testing. Recovered at home without any COVID-19 treatments.    Social History. Lives with his wife and 2 dogs. Lived in [place], [place] x 5 years, then moved to [place] x 2 years. Just moved back to [place]  2 months ago. No tobacco, E-cigarettes, or illicits. No birds.    FH: no family history of TB.    Impression/Recs: Unclear whether chest pain on admission is related to cavitary lesion or if this is just sequelae from a previous infection (bacterial or fungal). Need to r/out TB, despite negative risk factors.  Additional studies noted above.  Airborne isolation until TB ruled-out.    Brian East MD  [facility]  Direct messaging: [facility] Paging  On-Call ID provider: [phone], option: [phone]

## 2022-08-22 NOTE — PLAN OF CARE
Patient has denied chest pain all day.  Troponin increased to 0.46.  CTA of coronaries completed.  FFR will be run overnight to determine blood flow and will result in AM.  Pt NPO at midnight in case heart cath is indicated.  Patient showered independently.  Covid isolation lifted, remains on airborne precautions.    Problem: Plan of Care - These are the overarching goals to be used throughout the patient stay.    Goal: Plan of Care Review/Shift Note  Description: The Plan of Care Review/Shift note should be completed every shift.  The Outcome Evaluation is a brief statement about your assessment that the patient is improving, declining, or no change.  This information will be displayed automatically on your shift note.  Outcome: Ongoing, Progressing  Flowsheets (Taken 8/22/2022 1831)  Plan of Care Reviewed With: patient  Outcome Evaluation: Denies chest pain, coronary CTA completed, awaiting FFR result in AM  Overall Patient Progress: improving  Goal: Absence of Hospital-Acquired Illness or Injury  Outcome: Met  Intervention: Identify and Manage Fall Risk  Recent Flowsheet Documentation  Taken 8/22/2022 1200 by Marcelle Lagunas RN  Safety Promotion/Fall Prevention: clutter free environment maintained  Taken 8/22/2022 0800 by Marcelle Lagunas RN  Safety Promotion/Fall Prevention: clutter free environment maintained  Intervention: Prevent Skin Injury  Recent Flowsheet Documentation  Taken 8/22/2022 1200 by Marcelle Lagunas RN  Body Position: position changed independently  Taken 8/22/2022 0800 by Marcelle Lagunas RN  Body Position: position changed independently  Intervention: Prevent and Manage VTE (Venous Thromboembolism) Risk  Recent Flowsheet Documentation  Taken 8/22/2022 1200 by Marcelle Lagunas RN  Activity Management:   activity adjusted per tolerance   ambulated in room  Taken 8/22/2022 0800 by Marcelle Lagunas RN  Activity Management:   activity adjusted per tolerance   ambulated in  room  Intervention: Prevent Infection  Recent Flowsheet Documentation  Taken 8/22/2022 1200 by Marcelle Lagunas, RN  Infection Prevention:   personal protective equipment utilized   single patient room provided   visitors restricted/screened  Taken 8/22/2022 0800 by Marcelle Lagunas, RN  Infection Prevention:   personal protective equipment utilized   single patient room provided   visitors restricted/screened  Goal: Optimal Comfort and Wellbeing  Outcome: Ongoing, Progressing     Problem: Chest Pain  Goal: Resolution of Chest Pain Symptoms  Outcome: Met   Goal Outcome Evaluation:    Plan of Care Reviewed With: patient     Overall Patient Progress: improving    Outcome Evaluation: Denies chest pain, coronary CTA completed, awaiting FFR result in AM

## 2022-08-22 NOTE — PROGRESS NOTES
"[facility] Medicine PROGRESS NOTE      Identification/Summary: Jose Raul Larsen is a 57 year old male with a past medical history of hypertension, recent COVID-19 infection secondhand smoking exposure presented with exertional chest pressure who was admitted on 8/21/2022 for1.6 cm cavitary right lung lesion.  Most recently lived in [place], [place] before he moved to [place] 3 months ago. Borderline troponin 0.13, 0.26.  Pulmonary, ID consultation pending.  Infectious work-up pending.  Given his chest pain is concerning for angina, borderline troponin, consulted cardiology today.  Echocardiogram done results pending.    Assessment and Plan:  1.6 cm cavitary lung lesion  Afebrile.  No leukocytosis  Continue Levaquin, vancomycin  Check sputum cultures if able to obtain  Check Fungitell, coccidioidomycosis antibody  Request ID, pulmonary consultation  Is on room air  on precautions.    Recent COVID-19 infection  CT chest findings not consistent with COVID 19.  Await ID consult.     Chest pressure  Borderline troponin  EKG on admission  Troponin at 0.26 we will recheck today  Recheck EKG  Echo results pending  Request cardiology consultation regarding further work-up.     Hypertension  Blood pressure is high  Increase losartan from 25 to 50 mg.  IV hydralazine if needed.    DVT prophylaxis subcu heparin  Diet: NPO for Medical/Clinical Reasons Except for: Meds  Code Status: Full Code    Anticipated possible discharge in few days once clinical improvement, consults milestones are met.    Interval History/Subjective:  Denies any further chest pain.  No shortness of breath.  Intermittent dry cough.  No other urinary or bowel complaints.     Physical Exam/Objective:  Vitals I/O   Vital signs:  Temp: 97.8  F (36.6  C) Temp src: Oral BP: (!) 174/105 (RN Notified) Pulse: 77   Resp: 20 SpO2: 96 % O2 Device: None (Room air)   Height: 180.3 cm (5' 11\") Weight: 101.9 kg (224 lb 9.6 oz)  Estimated body mass index is " "31.33 kg/m  as calculated from the following:    Height as of this encounter: 1.803 m (5' 11\").    Weight as of this encounter: 101.9 kg (224 lb 9.6 oz). I/O last 3 completed shifts:  In: 791.25 [P.O.:240; I.V.:551.25]  Out: 100 [Urine:100]     Body mass index is 31.33 kg/m .    General Appearance:  Alert, cooperative, no distress   Head:  Normocephalic, atraumatic   Eyes:  PERRL    Throat:  mucosa; moist   Neck: No JVD, thyromegaly   Lungs:   Clear to auscultation bilaterally, respirations unlabored   Chest Wall:  No tenderness or deformity   Heart:  Regular rate and rhythm, S1, S2 normal,no murmur   Abdomen:   Soft, non tender, non distended, bowel sounds present, no guarding or rigidity   Extremities: No edema, no joint swelling   Skin: Skin color, texture, turgor normal, no rashes or lesions   Neurologic: Alert and oriented X 3, Moves all 4 extremities       Medications:   Personally Reviewed.    levofloxacin  500 mg Intravenous Q24H     losartan  25 mg Oral QAM     sodium chloride (PF)  3 mL Intracatheter Q8H     vancomycin  1,500 mg Intravenous Q18H       Data reviewed today: I personally reviewed all new medications, labs, imaging/diagnostics reports over the past 24 hours. Pertinent findings include    Labs:  Most Recent 3 CBC's:Recent Labs   Lab Test 08/22/22  0436 08/21/22  1613   WBC 7.7 6.6   HGB 15.4 16.0   MCV 85 86    234     Most Recent 3 BMP's:Recent Labs   Lab Test 08/22/22  0436 08/21/22  1613    139   POTASSIUM 3.9 3.9   CHLORIDE 107 106   CO2 26 28   BUN 13 14   CR 0.98 1.12   ANIONGAP 3* 5   LEAH 8.9 9.5    98     Most Recent 2 LFT's:Recent Labs   Lab Test 08/21/22  1613   AST 20   ALT 28   ALKPHOS 54   BILITOTAL 2.0*     Most Recent 3 Troponin's:No lab results found.  Most Recent 3 BNP's:No lab results found.    Imaging:   Recent Results (from the past 24 hour(s))   CT Chest Pulmonary Embolism w Contrast    Narrative    EXAM: CT CHEST PULMONARY EMBOLISM W CONTRAST  LOCATION: " [facility]  DATE/TIME: 8/21/2022 6:24 PM    INDICATION: Dyspnea on exertion. Chest pain.  COMPARISON: None.  TECHNIQUE: CT chest pulmonary angiogram during arterial phase injection of IV contrast. Multiplanar reformats and MIP reconstructions were performed. Dose reduction techniques were used.   CONTRAST: 100ml Isovue 370     FINDINGS:  ANGIOGRAM CHEST: Pulmonary arteries are normal caliber and negative for pulmonary emboli. Thoracic aorta is negative for dissection. No CT evidence of right heart strain.    LUNGS AND PLEURA: 1.6 cm cavitary lesion within the periphery of the right upper lobe with multiple small perilesional nodules. Intermediate uniform wall with some fluid in the cavity. Findings most likely inflammatory. Differential diagnosis would   include fungal diseases and such entities as tuberculosis.    MEDIASTINUM/AXILLAE: Normal.    CORONARY ARTERY CALCIFICATION: Mild.    UPPER ABDOMEN: Normal.    MUSCULOSKELETAL: Normal.      Impression    IMPRESSION:  1.  No pulmonary embolism.    2.  1.6 cm cavitary lesion within the periphery of the right upper lobe with multiple small perilesional nodules. Intermediate uniform wall thickness with some fluid within the cavity. This most likely is infectious/inflammatory and fungal diseases as   well as tuberculosis would be included in the differential diagnosis.    NOTE: ABNORMAL REPORT    1.  THE DICTATION ABOVE DESCRIBES AN ABNORMALITY FOR WHICH FOLLOW-UP IS NEEDED.        Lesa Moore MD  Hospitalist  [facility]

## 2022-08-22 NOTE — UTILIZATION REVIEW
Admission Status; Secondary Review Determination       Under the authority of the Utilization Management Committee, the utilization review process indicated a secondary review on the above patient. The review outcome is based on review of the medical records, discussions with staff, and applying clinical experience noted on the date of the review.     (x) Inpatient Status Appropriate - This patient's medical care is consistent with medical management for inpatient care and reasonable inpatient medical practice.     RATIONALE FOR DETERMINATION     Mr. Larsen is a 58 yo male pt with a PMH of HTN, recent [place] and recent COVID infection (+test 2 wks prior) who presents to the ED with exertional chest pain.  CT imaging revealed 1.6 cm cavitary right lung lesion; mildly elevated troponins also noted.  There is concern for active fungal pulmonary disease and possible ongoing active COVID infection.  He continues on IV vanco and IV levaquin; ID and pulmonary medicine have been consulted.   He remains NPO for possible biopsy/bronchoscopy later today.    He is requiring ongoing intpatient medical treatment and evaluation today.    At the time of admission with the information available to the attending physician more than 2 nights Hospital complex care was anticipated, based on patient risk of adverse outcome if treated as outpatient and complex care required. Inpatient admission is appropriate based on the Medicare guidelines.     The information on this document is developed by the utilization review team in order for the business office to ensure compliance. This only denotes the appropriateness of proper admission status and does not reflect the quality of care rendered.   The definitions of Inpatient Status and Observation Status used in making the determination above are those provided in the CMS Coverage Manual, Chapter 1 and Chapter 6, section 70.4.         Sincerely,     Amy  Dede, DO  Utilization Review  Physician Advisor  [facility].

## 2022-08-22 NOTE — PROGRESS NOTES
Difficult to see proximal circumflex and some artifact in this area.  The images will be sent for FFR which will be very helpful to decide if there is any significant lesion in the proximal circumflex.  These results will be available in the morning.

## 2022-08-22 NOTE — PHARMACY-VANCOMYCIN DOSING SERVICE
Pharmacy Vancomycin Initial Note  Date of Service 2022  Patient's  1965  57 year old, male    Indication: Healthcare-Associated Pneumonia    Current estimated CrCl = Estimated Creatinine Clearance: 88.5 mL/min (based on SCr of 1.12 mg/dL).    Creatinine for last 3 days  2022:  4:13 PM Creatinine 1.12 mg/dL    Recent Vancomycin Level(s) for last 3 days  No results found for requested labs within last 72 hours.      Vancomycin IV Administrations (past 72 hours)      No vancomycin orders with administrations in past 72 hours.                Nephrotoxins and other renal medications (From now, onward)    None          Contrast Orders - past 72 hours (72h ago, onward)    Start     Dose/Rate Route Frequency Stop    22  iopamidol (ISOVUE-370) solution 100 mL         100 mL Intravenous ONCE 22            Plan:  1. Start vancomycin  2,000 mg IV once in ED.   2. Please re-consult pharmacy to dose vancomycin if therapy to be continue inpatient.     Thank you for the consult.   Genesis Knowles, PharmD, BCPS

## 2022-08-22 NOTE — PLAN OF CARE
Problem: Plan of Care - These are the overarching goals to be used throughout the patient stay.    Goal: Optimal Comfort and Wellbeing  Outcome: Ongoing, Progressing     Problem: Chest Pain  Goal: Resolution of Chest Pain Symptoms  8/22/2022 0502 by Franny Dick RN  Outcome: Ongoing, Progressing  8/22/2022 0502 by Franny Dick RN  Outcome: Ongoing, Progressing   Goal Outcome Evaluation:                  Pt admitted from ED late evening shift after c/o chest pain worsening over 2 weeks. Pt denies chest pain currently. Pt found to be Covid + after testing positive 2 weeks ago.  Troponins negative X 3.  Pt will have ID and Pulm consults in the am for lesion noted on CT exam in lung. Pt denies productive cough at this time. Unable to obtain sputum for AFB.

## 2022-08-22 NOTE — H&P (VIEW-ONLY)
[facility]  [phone]        Assessment/Recommendations   Patient with some risk factors for coronary artery disease including hypertension, mild calcifications on his coronary arteries, and symptoms of chest heaviness.  Some of his symptoms are exertional related and sound much like anginal symptoms but also has the same symptoms at nighttime which might suggest reflux or esophageal spasm.  He is COVID-positive and has a cavitary lesion in his lungs so I am less eager to send him to the Cath Lab.  Troponins have trended up but EKG and echocardiogram are unremarkable.    We talked about stress testing versus CT angiogram versus diagnostic coronary angiogram and I have recommended CT coronary angiogram and the patient was in agreement.  We can accomplish that at [facility] today.  This will help risk stratify him and if there is not significant epicardial coronary artery disease, attention could be directed toward his GI track and his pulmonary issues.    Thank you for allowing us to participate in his care.    Phone visit with patient because of infectious disease issues.  Phone initiated at 12:34 PM and ended at 12:50 PM.  Additional 15 minutes with documentation, , discussion with hospital nurse and cardiovascular nurse.       History of Present Illness/Subjective    Mr. Jose Raul Larsen is a 57 year old male with no known coronary artery disease.  He developed symptoms of COVID about 2 and half weeks ago and after about 4 5 days his symptoms resolved.  About forward days ago he started develop exertionally related chest heaviness.  He was moving some boxes as they are moving back to [place] and he noticed that he had discomfort in his chest when carrying boxes and it would go away with you rested.  He also had difficulty sleeping and would also noticed the same discomfort which would wake him up at night and if he would sit up for a while it would go away.  The discomfort  "was across his entire chest and generally was about a 6 out of 10.  It was not associated with shortness of breath or diaphoresis.  It could go into both of his arms in a milder fashion.  Yesterday while walking to his car he had a more dramatic episode which was a 9.5 out of 10.  It did not go away while he was walking but it went away immediately when he sat down in the car.  The discomfort did not come back and he came into the emergency room.  Last night in the hospital he had some pressure that woke him up in the middle of the night but it was milder and he described it as a 3 out of 10.  It lasted about 20 minutes.  He denies a history of orthopnea, paroxysmal nocturnal dyspnea, peripheral edema, syncope or near syncopal episodes.  He has no history of rheumatic fever, cerebrovascular accident, heart murmur, or TIA.  The patient is not diabetic he reports that his cholesterol is \"okay\".  His parents may have had some heart disease.  Father likely had a stroke, both parents had hypertension.  The patient never smoked cigarettes but did get secondhand smoke from his parents as a child.    The patient grew up in [place] and is moved around the country.  He has spent time in [place] before.  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  He enjoys his work.  He is  and has 3 children.    ECG: Personally reviewed.  Normal    CT did not show evidence of pulmonary embolus or dissection.  He had mild coronary calcifications.  There is a cavitary lesion noted in his lungs.  BNP is normal.    Echocardiogram does not show wall motion normalities with normal left ventricular ejection fraction..       Physical Examination Review of Systems   BP (!) 166/109 (BP Location: Right arm)   Pulse 66   Temp 98.2  F (36.8  C) (Oral)   Resp 20   Ht 1.803 m (5' 11\")   Wt 101.9 kg (224 lb 9.6 oz)   SpO2 97%   BMI 31.33 kg/m    Body mass index is 31.33 kg/m .  Wt Readings from Last 3 Encounters:   08/22/22 " "101.9 kg (224 lb 9.6 oz)     General Appearance:   Alert, cooperative and in no acute distress.   ENT/Mouth:    EYES:     Neck:    Chest/Lungs:      Cardiovascular:      Abdomen:     Extremities:    Skin:    Neurologic:    Psychiatric: Appropriate affect.      Enc Vitals  BP: (!) 166/109  Pulse: 66  Resp: 20  Temp: 98.2  F (36.8  C)  Temp src: Oral  SpO2: 97 %  Weight: 101.9 kg (224 lb 9.6 oz)  Height: 180.3 cm (5' 11\")                                           Medical History  Surgical History Family History Social History   History reviewed. No pertinent past medical history. Past Surgical History:   Procedure Laterality Date     [facility] APPENDECTOMY      Description: Appendectomy;  Recorded: 02/19/2010;    History reviewed. No pertinent family history. Social History     Socioeconomic History     Marital status:      Spouse name: Not on file     Number of children: Not on file     Years of education: Not on file     Highest education level: Not on file   Occupational History     Not on file   Tobacco Use     Smoking status: Not on file     Smokeless tobacco: Not on file   Substance and Sexual Activity     Alcohol use: Not on file     Drug use: Not on file     Sexual activity: Not on file   Other Topics Concern     Not on file   Social History Narrative     Not on file     Social Determinants of Health     Financial Resource Strain: Not on file   Food Insecurity: Not on file   Transportation Needs: Not on file   Physical Activity: Not on file   Stress: Not on file   Social Connections: Not on file   Intimate Partner Violence: Not on file   Housing Stability: Not on file          Medications  Allergies   No current outpatient medications on file.    Allergies   Allergen Reactions     Cephalexin Hives and Rash         Lab Results    Chemistry/lipid CBC Cardiac Enzymes/BNP/TSH/INR   Lab Results   Component Value Date    CHOL 155 02/11/2014    HDL 41 02/11/2014    TRIG 99 02/11/2014    BUN 13 08/22/2022     " 08/22/2022    CO2 26 08/22/2022    Lab Results   Component Value Date    WBC 7.7 08/22/2022    HGB 15.4 08/22/2022    HCT 42.1 08/22/2022    MCV 85 08/22/2022     08/22/2022    Lab Results   Component Value Date    TROPONINI 0.46 (HH) 08/22/2022    BNP 38 08/21/2022

## 2022-08-22 NOTE — CONSULTS
"PULMONARY/CRITICAL CARE CONSULT NOTE    Date / Time of Admission:  8/21/2022  4:06 PM  Assessment:   57yoM presents with cavitary lung lesions after recent COVID-19 infection 8/5.    Differential includes fungal infection, tuberculosis, lymphoma (less likely). This does not appear consistent with bronchiectasis, SIMPSON, other cystic lung disease etiologies.     Clinically Significant Risk Factors Present on Admission                   # Obesity: Estimated body mass index is 31.24 kg/m  as calculated from the following:    Height as of this encounter: 1.803 m (5' 11\").    Weight as of this encounter: 101.6 kg (224 lb).          Active Problems:    Lung abscess (H)    Plan:   I worry that yield would be low from bronchoscopy given how small these lesions are. I would be willing to pursue bronchoscopy to rule out TB if the induced sputums are negative.  Discussed with ID who is managing antibiotic selection.  I think if he produces 3 samples for AFB and this is negative, patient should complete antibiotic course outpatient and have repeat CT scan in 6 weeks to ensure resolution of this process.  Will continue to follow.     Subjective:    Cc: chest pain.     HPI: 57 year old male with history of hypertension presents with chest pain. Came on suddenly, center of chest and spread to right and left and down left arm. It has been resolved since patient arrived in ED.     Patient has history of COVID-19 infection 8/5. He is not vaccinated for COVID.  CTA done on admission found no PE but did find several cavitary lesions in the right upper lobe all 1.6cm and less in size.     Past Medical history  Hypertension    Social History     Tobacco Use     Smoking status: Not on file     Smokeless tobacco: Not on file   Substance Use Topics     Alcohol use: Not on file       History reviewed. No pertinent family history.    Current Facility-Administered Medications   Medication     acetaminophen (TYLENOL) tablet 650 mg    Or     " "acetaminophen (TYLENOL) Suppository 650 mg     aspirin (ASA) chewable tablet 324 mg     hydrALAZINE (APRESOLINE) injection 10 mg     IF patient diabetic - HOLD: ALL ORAL HYPOGLYCEMICS: glipizide, glyburide, glimepiride, gliclazide, metformin (Glucophage), any metformin (Glucophage) containing medication, rosiglitazone (Avandia), pioglitazone (Actos), or sitagliptin (Januvia) on day of the procedure     levofloxacin (LEVAQUIN) infusion 500 mg     lidocaine (LMX4) cream     lidocaine 1 % 0.1-1 mL     [START ON 8/23/2022] losartan (COZAAR) tablet 50 mg     melatonin tablet 1 mg     metoprolol tartrate (LOPRESSOR) tablet  mg     ondansetron (ZOFRAN ODT) ODT tab 4 mg    Or     ondansetron (ZOFRAN) injection 4 mg     Patient is already receiving mechanical prophylaxis     sodium chloride (NEBUSAL) 3 % neb solution 3 mL     sodium chloride (PF) 0.9% PF flush 3 mL     sodium chloride (PF) 0.9% PF flush 3 mL     vancomycin 1500 mg in 0.9% NaCl 250 ml intermittent infusion 1,500 mg         Review of Systems: 12-point Review performed and negative aside from that noted in HPI.    Objective:    Vital signs:  BP (!) 167/106 (BP Location: Right arm)   Pulse 64   Temp 97.9  F (36.6  C) (Oral)   Resp 18   Ht 1.803 m (5' 11\")   Wt 101.6 kg (224 lb)   SpO2 95%   BMI 31.24 kg/m      GENERAL APPEARANCE: healthy, alert and no distress     EYES: EOMI, - PERRL     NECK: no adenopathy, no asymmetry, masses, or scars and thyroid normal to palpation     RESP: lungs clear to auscultation - no rales, rhonchi or wheezes     CV: regular rates and rhythm, normal S1 S2, no S3 or S4 and no murmur, click or rub -     ABDOMEN:  soft, nontender, no HSM or masses and bowel sounds normal     MS: extremities normal- no gross deformities noted, no evidence of inflammation in joints, FROM in all extremities.     SKIN: no suspicious lesions or rashes     NEURO: Normal strength and tone, sensory exam grossly normal, mentation intact and speech " normal     PSYCH: mentation appears normal. and affect normal/bright     LYMPHATICS: No axillary, cervical, inguinal, or supraclavicular nodes        Data    CT chest: personally reviewed  EXAM: CT CHEST PULMONARY EMBOLISM W CONTRAST  LOCATION: [facility] [place] [facility]  DATE/TIME: 8/21/2022 6:24 PM     INDICATION: Dyspnea on exertion. Chest pain.  COMPARISON: None.  TECHNIQUE: CT chest pulmonary angiogram during arterial phase injection of IV contrast. Multiplanar reformats and MIP reconstructions were performed. Dose reduction techniques were used.   CONTRAST: 100ml Isovue 370      FINDINGS:  ANGIOGRAM CHEST: Pulmonary arteries are normal caliber and negative for pulmonary emboli. Thoracic aorta is negative for dissection. No CT evidence of right heart strain.     LUNGS AND PLEURA: 1.6 cm cavitary lesion within the periphery of the right upper lobe with multiple small perilesional nodules. Intermediate uniform wall with some fluid in the cavity. Findings most likely inflammatory. Differential diagnosis would   include fungal diseases and such entities as tuberculosis.     MEDIASTINUM/AXILLAE: Normal.     CORONARY ARTERY CALCIFICATION: Mild.     UPPER ABDOMEN: Normal.     MUSCULOSKELETAL: Normal.                                                                      IMPRESSION:  1.  No pulmonary embolism.     2.  1.6 cm cavitary lesion within the periphery of the right upper lobe with multiple small perilesional nodules. Intermediate uniform wall thickness with some fluid within the cavity. This most likely is infectious/inflammatory and fungal diseases as   well as tuberculosis would be included in the differential diagnosis.     NOTE: ABNORMAL REPORT     1.  THE DICTATION ABOVE DESCRIBES AN ABNORMALITY FOR WHICH FOLLOW-UP IS NEEDED.     Laboratory:  Results for orders placed or performed during the hospital encounter of 08/21/22   Basic metabolic panel   Result Value Ref Range    Sodium 136 136 - 145  mmol/L    Potassium 3.9 3.5 - 5.0 mmol/L    Chloride 107 98 - 107 mmol/L    Carbon Dioxide (CO2) 26 22 - 31 mmol/L    Anion Gap 3 (L) 5 - 18 mmol/L    Urea Nitrogen 13 8 - 22 mg/dL    Creatinine 0.98 0.70 - 1.30 mg/dL    Calcium 8.9 8.5 - 10.5 mg/dL    Glucose 100 70 - 125 mg/dL    GFR Estimate 90 >60 mL/min/1.73m2     Lab Results   Component Value Date    WBC 7.7 08/22/2022    HGB 15.4 08/22/2022    HCT 42.1 08/22/2022    MCV 85 08/22/2022     08/22/2022

## 2022-08-22 NOTE — PROGRESS NOTES
Care Management Initial Consult    General Information  Assessment completed with: Spouse or significant other, wife Jazmyn via phone  Type of CM/SW Visit: Chart Assessment    Primary Care Provider verified and updated as needed:  (has appt to establish care with new PCP at [facility])   Readmission within the last 30 days: no previous admission in last 30 days    Living Environment:   People in home: spouse     Current living Arrangements: private home       Able to return to prior arrangements: yes      Family/Social Support:  Care provided by: self   Provides care for: no one   Marital Status:     Current Resources:   Patient receiving home care services: No   Community Resources: None  Equipment currently used at home: None      Functional Status:  Prior to admission patient needed assistance:   Dependent ADLs:: Independent  Dependent IADLs:: Independent     Values/Beliefs:  Spiritual, Cultural Beliefs, Sabianist Practices, Values that affect care:  None               Additional Information:  Chart reviewed. Covid +. CM spoke to wife Jazmyn via phone. He is independent with all cares. No services. Just moved back to [place] after being out of state for 7 years and has appointment to establish PCP with [facility] in [place].     Wife does not anticipate any needs at hospital discharge. She will provide the transportation home.   Micki Farrell RN

## 2022-08-22 NOTE — CONSULTS
[facility]  [phone]        Assessment/Recommendations   Patient with some risk factors for coronary artery disease including hypertension, mild calcifications on his coronary arteries, and symptoms of chest heaviness.  Some of his symptoms are exertional related and sound much like anginal symptoms but also has the same symptoms at nighttime which might suggest reflux or esophageal spasm.  He is COVID-positive and has a cavitary lesion in his lungs so I am less eager to send him to the Cath Lab.  Troponins have trended up but EKG and echocardiogram are unremarkable.    We talked about stress testing versus CT angiogram versus diagnostic coronary angiogram and I have recommended CT coronary angiogram and the patient was in agreement.  We can accomplish that at [facility] today.  This will help risk stratify him and if there is not significant epicardial coronary artery disease, attention could be directed toward his GI track and his pulmonary issues.    Thank you for allowing us to participate in his care.    Phone visit with patient because of infectious disease issues.  Phone initiated at 12:34 PM and ended at 12:50 PM.  Additional 15 minutes with documentation, , discussion with hospital nurse and cardiovascular nurse.       History of Present Illness/Subjective    Mr. Jose Raul Larsen is a 57 year old male with no known coronary artery disease.  He developed symptoms of COVID about 2 and half weeks ago and after about 4 5 days his symptoms resolved.  About forward days ago he started develop exertionally related chest heaviness.  He was moving some boxes as they are moving back to [place] and he noticed that he had discomfort in his chest when carrying boxes and it would go away with you rested.  He also had difficulty sleeping and would also noticed the same discomfort which would wake him up at night and if he would sit up for a while it would go away.  The discomfort  "was across his entire chest and generally was about a 6 out of 10.  It was not associated with shortness of breath or diaphoresis.  It could go into both of his arms in a milder fashion.  Yesterday while walking to his car he had a more dramatic episode which was a 9.5 out of 10.  It did not go away while he was walking but it went away immediately when he sat down in the car.  The discomfort did not come back and he came into the emergency room.  Last night in the hospital he had some pressure that woke him up in the middle of the night but it was milder and he described it as a 3 out of 10.  It lasted about 20 minutes.  He denies a history of orthopnea, paroxysmal nocturnal dyspnea, peripheral edema, syncope or near syncopal episodes.  He has no history of rheumatic fever, cerebrovascular accident, heart murmur, or TIA.  The patient is not diabetic he reports that his cholesterol is \"okay\".  His parents may have had some heart disease.  Father likely had a stroke, both parents had hypertension.  The patient never smoked cigarettes but did get secondhand smoke from his parents as a child.    The patient grew up in [place] and is moved around the country.  He has spent time in [place] before.  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  He enjoys his work.  He is  and has 3 children.    ECG: Personally reviewed.  Normal    CT did not show evidence of pulmonary embolus or dissection.  He had mild coronary calcifications.  There is a cavitary lesion noted in his lungs.  BNP is normal.    Echocardiogram does not show wall motion normalities with normal left ventricular ejection fraction..       Physical Examination Review of Systems   BP (!) 166/109 (BP Location: Right arm)   Pulse 66   Temp 98.2  F (36.8  C) (Oral)   Resp 20   Ht 1.803 m (5' 11\")   Wt 101.9 kg (224 lb 9.6 oz)   SpO2 97%   BMI 31.33 kg/m    Body mass index is 31.33 kg/m .  Wt Readings from Last 3 Encounters:   08/22/22 " "101.9 kg (224 lb 9.6 oz)     General Appearance:   Alert, cooperative and in no acute distress.   ENT/Mouth:    EYES:     Neck:    Chest/Lungs:      Cardiovascular:      Abdomen:     Extremities:    Skin:    Neurologic:    Psychiatric: Appropriate affect.      Enc Vitals  BP: (!) 166/109  Pulse: 66  Resp: 20  Temp: 98.2  F (36.8  C)  Temp src: Oral  SpO2: 97 %  Weight: 101.9 kg (224 lb 9.6 oz)  Height: 180.3 cm (5' 11\")                                           Medical History  Surgical History Family History Social History   History reviewed. No pertinent past medical history. Past Surgical History:   Procedure Laterality Date     [facility] APPENDECTOMY      Description: Appendectomy;  Recorded: 02/19/2010;    History reviewed. No pertinent family history. Social History     Socioeconomic History     Marital status:      Spouse name: Not on file     Number of children: Not on file     Years of education: Not on file     Highest education level: Not on file   Occupational History     Not on file   Tobacco Use     Smoking status: Not on file     Smokeless tobacco: Not on file   Substance and Sexual Activity     Alcohol use: Not on file     Drug use: Not on file     Sexual activity: Not on file   Other Topics Concern     Not on file   Social History Narrative     Not on file     Social Determinants of Health     Financial Resource Strain: Not on file   Food Insecurity: Not on file   Transportation Needs: Not on file   Physical Activity: Not on file   Stress: Not on file   Social Connections: Not on file   Intimate Partner Violence: Not on file   Housing Stability: Not on file          Medications  Allergies   No current outpatient medications on file.    Allergies   Allergen Reactions     Cephalexin Hives and Rash         Lab Results    Chemistry/lipid CBC Cardiac Enzymes/BNP/TSH/INR   Lab Results   Component Value Date    CHOL 155 02/11/2014    HDL 41 02/11/2014    TRIG 99 02/11/2014    BUN 13 08/22/2022     " 08/22/2022    CO2 26 08/22/2022    Lab Results   Component Value Date    WBC 7.7 08/22/2022    HGB 15.4 08/22/2022    HCT 42.1 08/22/2022    MCV 85 08/22/2022     08/22/2022    Lab Results   Component Value Date    TROPONINI 0.46 (HH) 08/22/2022    BNP 38 08/21/2022

## 2022-08-22 NOTE — H&P
Admission History & Physical  Jose Raul Larsen, 1965, [id number]    [facility]  System, Provider Not In, None    Assessment and Plan:  57-year-old male with recent COVID-19 infection, admitted with exertional chest pressure, found to have 1.6 cm cavitary lesion right lung concerning for bacterial versus fungal etiology.    1.6 cm cavitary lung lesion  Afebrile.  No leukocytosis  Continue Levaquin, vancomycin  Check sputum cultures if able to obtain  Check Fungitell, cocci antibody  Request ID, pulmonary consultation  Is on room air    Recent COVID-19 infection  CT chest findings not consistent with COVID 19.  Await ID consult.    Chest pressure  Borderline troponin  Check serial troponins  Check echocardiogram  Likely will need stress testing at some point    Hypertension  DVT prophylaxis subcu heparin  Code: Full    Expected length of stay : anticipate hospitalization more than 2 days.     Chief Complaint: Chest pressure    HPI:     Jose Raul Larsen is a 57 year old old male past medical history significant for hypertension recent COVID-19 infection 8/5/22, presented to ED with complaints of chest pressure last 5 days.  He reports he was diagnosed with pneumonia about 2 years ago since then he has had intermittent cough and following recent COVID his cough has been more frequent.  Last 5 days he has exertional chest pressure across the anterior chest resolved with rest.  Sometimes the pain radiates to the shoulders and to his back.  No associated shortness of breath.  No fever chills.  Mostly dry cough.  He was seen in urgent care yesterday and was given Pepcid for Reflux.  Today he had another episode of significant chest discomfort and subsequently presented to the ED.  Patient was living in [place] from May to June, before this he lived in [facility].  No recent sick contacts.    EKG nonischemic.  Initial troponin was borderline 0.1.  CT PE run  1.6 cm cavitary lesion  within the periphery of the right upper lobe with multiple small perilesional nodules. Intermediate uniform wall thickness with some fluid within the cavity. This most likely is infectious/inflammatory and fungal diseases as well as tuberculosis would be included in the differential diagnosis.  He has cephalosporin allergy.  Started on Levaquin, vancomycin and admitted.     Medical History   Patient Active Problem List    Diagnosis Date Noted     Lung abscess (H) 08/21/2022     Priority: Medium     Vitamin D Deficiency      Priority: Medium     Created by Conversion  Replacement Utility updated for latest IMO load         Essential Hypertension      Priority: Medium     Created by Conversion  Replacement Utility updated for latest IMO load         Paronychia Of The Finger      Priority: Medium     Created by Conversion         Allergic Urticaria      Priority: Medium     Created by Conversion         Obesity      Priority: Medium     Created by Conversion            Surgical History  He  has a past surgical history that includes APPENDECTOMY.     Past Surgical History:   Procedure Laterality Date     [facility] APPENDECTOMY      Description: Appendectomy;  Recorded: 02/19/2010;       Allergies  Allergies   Allergen Reactions     Cephalexin Hives and Rash       Prior to Admission Medications   Medications Prior to Admission   Medication Sig Dispense Refill Last Dose     fish oil-omega-3 fatty acids 1000 MG capsule Take 1 g by mouth 2 times daily (with meals) Take after lunch and dinner.        ibuprofen (ADVIL/MOTRIN) 200 MG tablet Take 400 mg by mouth At Bedtime   8/20/2022 at Unknown time     losartan (COZAAR) 25 MG tablet Take 25 mg by mouth every morning   8/21/2022 at Unknown time     Misc Natural Products (OSTEO BI-FLEX JOINT SHIELD) TABS Take 1 tablet by mouth 2 times daily (with meals) Take after lunch and dinner.        vitamin D3 (CHOLECALCIFEROL) 50 mcg (2000 units) tablet Take 50 mcg by mouth every morning    "8/21/2022 at Unknown time       Social History  Reviewed, and he denies smoking but had secondhand smoking exposure as a child.  Occasional alcohol use    Family History  Reviewed, and no premature coronary disease in the family       Review Of Systems  Complete As per admission HPI, all other systems reviewed and negative.     Physical Exam:  Vital signs:  Temp: 98  F (36.7  C) Temp src: Temporal BP: (!) 176/113 Pulse: 74   Resp: 26 SpO2: 97 %     Height: 180.3 cm (5' 11\") Weight: 102.1 kg (225 lb)  Estimated body mass index is 31.38 kg/m  as calculated from the following:    Height as of this encounter: 1.803 m (5' 11\").    Weight as of this encounter: 102.1 kg (225 lb).    General Appearance:  Awake Alert, orientedx3, not in any apparent distress   Head:  Normocephalic, without obvious abnormality   Eyes:  PERRL, conjunctiva/corneas clear   Throat: Oral mucosa moist   Neck: Supple,  no JVD   Lungs:    Clear to auscultation bilaterally, respirations unlabored   Chest Wall:  No tenderness   Heart:  Regular rate and rhythm, S1, S2 normal,no murmur   Abdomen:   Soft, non-tender, bowel sounds present,  no guarding, rigidity    Extremities:  no edema, no joint swelling   Skin: Skin color, texture, turgor normal, no rashes or lesions   Neurologic: Alert and oriented X 3, no focal deficits     Results:  Labs Ordered and Resulted from Time of ED Arrival to Time of ED Departure   COMPREHENSIVE METABOLIC PANEL - Abnormal       Result Value    Sodium 139      Potassium 3.9      Chloride 106      Carbon Dioxide (CO2) 28      Anion Gap 5      Urea Nitrogen 14      Creatinine 1.12      Calcium 9.5      Glucose 98      Alkaline Phosphatase 54      AST 20      ALT 28      Protein Total 7.3      Albumin 4.1      Bilirubin Total 2.0 (*)     GFR Estimate 77     INFLUENZA A/B & SARS-COV2 PCR MULTIPLEX - Abnormal    Influenza A PCR Negative      Influenza B PCR Negative      RSV PCR Negative      SARS CoV2 PCR Positive (*)    TROPONIN " I - Normal    Troponin I 0.10     MAGNESIUM - Normal    Magnesium 1.9     B-TYPE NATRIURETIC PEPTIDE ([facility] ONLY) - Normal    BNP 38     TROPONIN I - Normal    Troponin I 0.13     CBC WITH PLATELETS AND DIFFERENTIAL    WBC Count 6.6      RBC Count 5.20      Hemoglobin 16.0      Hematocrit 44.6      MCV 86      MCH 30.8      MCHC 35.9      RDW 13.1      Platelet Count 234      % Neutrophils 70      % Lymphocytes 20      % Monocytes 8      % Eosinophils 1      % Basophils 1      % Immature Granulocytes 0      NRBCs per 100 WBC 0      Absolute Neutrophils 4.5      Absolute Lymphocytes 1.3      Absolute Monocytes 0.6      Absolute Eosinophils 0.1      Absolute Basophils 0.1      Absolute Immature Granulocytes 0.0      Absolute NRBCs 0.0        EKG:  EKG: Normal sinus rhythm no other acute ST-T wave changes.    Imaging:   CT Chest Pulmonary Embolism w Contrast    Result Date: 8/21/2022  EXAM: CT CHEST PULMONARY EMBOLISM W CONTRAST LOCATION: [facility] DATE/TIME: 8/21/2022 6:24 PM INDICATION: Dyspnea on exertion. Chest pain. COMPARISON: None. TECHNIQUE: CT chest pulmonary angiogram during arterial phase injection of IV contrast. Multiplanar reformats and MIP reconstructions were performed. Dose reduction techniques were used. CONTRAST: 100ml Isovue 370 FINDINGS: ANGIOGRAM CHEST: Pulmonary arteries are normal caliber and negative for pulmonary emboli. Thoracic aorta is negative for dissection. No CT evidence of right heart strain. LUNGS AND PLEURA: 1.6 cm cavitary lesion within the periphery of the right upper lobe with multiple small perilesional nodules. Intermediate uniform wall with some fluid in the cavity. Findings most likely inflammatory. Differential diagnosis would include fungal diseases and such entities as tuberculosis. MEDIASTINUM/AXILLAE: Normal. CORONARY ARTERY CALCIFICATION: Mild. UPPER ABDOMEN: Normal. MUSCULOSKELETAL: Normal.     IMPRESSION: 1.  No pulmonary embolism. 2.  1.6  cm cavitary lesion within the periphery of the right upper lobe with multiple small perilesional nodules. Intermediate uniform wall thickness with some fluid within the cavity. This most likely is infectious/inflammatory and fungal diseases as well as tuberculosis would be included in the differential diagnosis. NOTE: ABNORMAL REPORT 1.  THE DICTATION ABOVE DESCRIBES AN ABNORMALITY FOR WHICH FOLLOW-UP IS NEEDED.         Lesa Moore M.D  [facility]  Internal Medicine    8/21/2022  9:10 PM

## 2022-08-23 LAB
1,3 BETA GLUCAN SER-MCNC: <31 PG/ML
BACTERIA SPT CULT: NORMAL
BSA FOR ECHO PROCEDURE: 1 M2
CHOLEST SERPL-MCNC: 169 MG/DL
ERYTHROCYTE [DISTWIDTH] IN BLOOD BY AUTOMATED COUNT: 13.2 % (ref 10–15)
GRAM STAIN RESULT: NORMAL
HCT VFR BLD AUTO: 43.3 % (ref 40–53)
HDLC SERPL-MCNC: 33 MG/DL
HGB BLD-MCNC: 15.5 G/DL (ref 13.3–17.7)
HIV 1+2 AB+HIV1 P24 AG SERPL QL IA: NONREACTIVE
LDLC SERPL CALC-MCNC: 76 MG/DL
MCH RBC QN AUTO: 30.9 PG (ref 26.5–33)
MCHC RBC AUTO-ENTMCNC: 35.8 G/DL (ref 31.5–36.5)
MCV RBC AUTO: 86 FL (ref 78–100)
MRSA DNA SPEC QL NAA+PROBE: NEGATIVE
OBSERVATION IMP: NEGATIVE
PLATELET # BLD AUTO: 215 10E3/UL (ref 150–450)
RBC # BLD AUTO: 5.02 10E6/UL (ref 4.4–5.9)
SA TARGET DNA: NEGATIVE
TRIGL SERPL-MCNC: 301 MG/DL
UFH PPP CHRO-ACNC: 0.45 IU/ML
WBC # BLD AUTO: 7.6 10E3/UL (ref 4–11)

## 2022-08-23 PROCEDURE — 99232 SBSQ HOSP IP/OBS MODERATE 35: CPT | Performed by: INTERNAL MEDICINE

## 2022-08-23 PROCEDURE — 99232 SBSQ HOSP IP/OBS MODERATE 35: CPT | Performed by: EMERGENCY MEDICINE

## 2022-08-23 PROCEDURE — 87206 SMEAR FLUORESCENT/ACID STAI: CPT | Performed by: INTERNAL MEDICINE

## 2022-08-23 PROCEDURE — 85520 HEPARIN ASSAY: CPT | Performed by: INTERNAL MEDICINE

## 2022-08-23 PROCEDURE — 85018 HEMOGLOBIN: CPT | Performed by: INTERNAL MEDICINE

## 2022-08-23 PROCEDURE — 250N000013 HC RX MED GY IP 250 OP 250 PS 637: Performed by: INTERNAL MEDICINE

## 2022-08-23 PROCEDURE — 99232 SBSQ HOSP IP/OBS MODERATE 35: CPT | Mod: GC | Performed by: INTERNAL MEDICINE

## 2022-08-23 PROCEDURE — 87449 NOS EACH ORGANISM AG IA: CPT | Performed by: INTERNAL MEDICINE

## 2022-08-23 PROCEDURE — 87070 CULTURE OTHR SPECIMN AEROBIC: CPT | Performed by: INTERNAL MEDICINE

## 2022-08-23 PROCEDURE — 36415 COLL VENOUS BLD VENIPUNCTURE: CPT | Performed by: EMERGENCY MEDICINE

## 2022-08-23 PROCEDURE — 210N000002 HC R&B HEART CARE

## 2022-08-23 PROCEDURE — 250N000009 HC RX 250: Performed by: INTERNAL MEDICINE

## 2022-08-23 PROCEDURE — 80061 LIPID PANEL: CPT | Performed by: EMERGENCY MEDICINE

## 2022-08-23 PROCEDURE — 99232 SBSQ HOSP IP/OBS MODERATE 35: CPT | Mod: 95 | Performed by: INTERNAL MEDICINE

## 2022-08-23 PROCEDURE — 87102 FUNGUS ISOLATION CULTURE: CPT | Performed by: INTERNAL MEDICINE

## 2022-08-23 PROCEDURE — 87116 MYCOBACTERIA CULTURE: CPT | Performed by: INTERNAL MEDICINE

## 2022-08-23 PROCEDURE — 87385 HISTOPLASMA CAPSUL AG IA: CPT | Performed by: INTERNAL MEDICINE

## 2022-08-23 PROCEDURE — 94640 AIRWAY INHALATION TREATMENT: CPT | Mod: 76

## 2022-08-23 PROCEDURE — 250N000011 HC RX IP 250 OP 636: Performed by: INTERNAL MEDICINE

## 2022-08-23 PROCEDURE — 87641 MR-STAPH DNA AMP PROBE: CPT | Performed by: INTERNAL MEDICINE

## 2022-08-23 PROCEDURE — 999N000157 HC STATISTIC RCP TIME EA 10 MIN

## 2022-08-23 PROCEDURE — 94799 UNLISTED PULMONARY SVC/PX: CPT

## 2022-08-23 PROCEDURE — 36415 COLL VENOUS BLD VENIPUNCTURE: CPT | Performed by: INTERNAL MEDICINE

## 2022-08-23 RX ORDER — SODIUM CHLORIDE 9 MG/ML
INJECTION, SOLUTION INTRAVENOUS CONTINUOUS
Status: CANCELLED | OUTPATIENT
Start: 2022-08-23

## 2022-08-23 RX ORDER — ASPIRIN 325 MG
325 TABLET ORAL ONCE
Status: CANCELLED | OUTPATIENT
Start: 2022-08-23 | End: 2022-08-23

## 2022-08-23 RX ORDER — ASPIRIN 81 MG/1
243 TABLET, CHEWABLE ORAL ONCE
Status: CANCELLED | OUTPATIENT
Start: 2022-08-23

## 2022-08-23 RX ORDER — ENOXAPARIN SODIUM 100 MG/ML
1 INJECTION SUBCUTANEOUS EVERY 12 HOURS
Status: DISCONTINUED | OUTPATIENT
Start: 2022-08-23 | End: 2022-08-23 | Stop reason: ALTCHOICE

## 2022-08-23 RX ORDER — LIDOCAINE 40 MG/G
CREAM TOPICAL
Status: CANCELLED | OUTPATIENT
Start: 2022-08-23

## 2022-08-23 RX ORDER — HEPARIN SODIUM 10000 [USP'U]/100ML
0-5000 INJECTION, SOLUTION INTRAVENOUS CONTINUOUS
Status: DISCONTINUED | OUTPATIENT
Start: 2022-08-23 | End: 2022-08-24 | Stop reason: HOSPADM

## 2022-08-23 RX ADMIN — HEPARIN SODIUM 1200 UNITS/HR: 10000 INJECTION, SOLUTION INTRAVENOUS at 17:32

## 2022-08-23 RX ADMIN — ASPIRIN 81 MG CHEWABLE TABLET 324 MG: 81 TABLET CHEWABLE at 08:27

## 2022-08-23 RX ADMIN — Medication 1500 MG: at 08:31

## 2022-08-23 RX ADMIN — LOSARTAN POTASSIUM 50 MG: 50 TABLET, FILM COATED ORAL at 08:28

## 2022-08-23 RX ADMIN — SODIUM CHLORIDE SOLN NEBU 3% 3 ML: 3 NEBU SOLN at 08:59

## 2022-08-23 RX ADMIN — LEVOFLOXACIN 500 MG: 5 INJECTION, SOLUTION INTRAVENOUS at 00:16

## 2022-08-23 ASSESSMENT — ACTIVITIES OF DAILY LIVING (ADL)
ADLS_ACUITY_SCORE: 20

## 2022-08-23 NOTE — PLAN OF CARE
Problem: Chest Pain  Goal: Resolution of Chest Pain Symptoms  Outcome: Ongoing, Progressing    Problem: Plan of Care - These are the overarching goals to be used throughout the patient stay.    Goal: Absence of Hospital-Acquired Illness or Injury  Intervention: Prevent Skin Injury  Recent Flowsheet Documentation  Taken 8/23/2022 0100 by Velvet Rodriguez RN  Body Position: position changed independently  Taken 8/22/2022 2200 by Velvet Rodriguez RN  Body Position: position changed independently    Pt A&Ox4, able to make needs known. Denies pain this shift. Up in room independently. BP is elevated this shift, other VSS. Pt denies CP and SOB. NPO since 0000. MRSA swab sent down to lab. Cup for sputum at bedside. Pt sleeping between cares. Call light within reach. No other acute changes this shift.    Velvet Rodriguez RN  [phone]

## 2022-08-23 NOTE — PROGRESS NOTES
PULMONARY PROGRESS NOTE    Date / Time of Admission:  8/21/2022  4:06 PM  Assessment:   57yoM presents with cavitary lung lesions after recent COVID-19 infection 8/5.     Differential includes fungal infection, tuberculosis, lymphoma (less likely). This does not appear consistent with bronchiectasis, SIMPSON, other cystic lung disease etiologies.     Active Problems:    Lung abscess (H)      Plan:   Given the concern for active cardiac ischemia, bronchoscopy is contraindicated at this time.  I have added metaneb to the sodium chloride nebs.  I discussed with ID--may not be able to rule out TB in this low-risk patient.  Antibiotic course per ID  I will order a follow up CT scan of chest in 6 weeks to ensure resolution of cavitary nodules.    Subjective:   HPI:  Jose Raul Larsen is a 57 year old male with history of hypertension presents with chest pain. Came on suddenly, center of chest and spread to right and left and down left arm. It has been resolved since patient arrived in ED.      Patient has history of COVID-19 infection 8/5. He is not vaccinated for COVID.  CTA done on admission found no PE but did find several cavitary lesions in the right upper lobe all 1.6cm and less in size.     Chest pain has since resolved but CTA coronary arteries shows possibility of small area of occlusion and troponin was elevated. Patient is in need of cardiac angiogram but is still in isolation.         Allergies:   Allergies   Allergen Reactions     Cephalexin Hives and Rash        MEDS:  Scheduled Meds:    aspirin  324 mg Oral Daily     levofloxacin  500 mg Intravenous Q24H     losartan  50 mg Oral QAM     sodium chloride (PF)  3 mL Intracatheter Q8H     Continuous Infusions:    - MEDICATION INSTRUCTIONS -       PRN Meds:.acetaminophen **OR** acetaminophen, hydrALAZINE, HOLD MEDICATION, lidocaine 4%, lidocaine (buffered or not buffered), melatonin, metoprolol tartrate, ondansetron **OR** ondansetron, - MEDICATION INSTRUCTIONS -,  "sodium chloride (PF)    Objective:   VITALS:  BP (!) 148/86 (BP Location: Left arm)   Pulse 77   Temp 98.1  F (36.7  C) (Oral)   Resp 16   Ht 1.803 m (5' 11\")   Wt 101.6 kg (224 lb)   SpO2 96%   BMI 31.24 kg/m    EXAM:    GENERAL APPEARANCE: Alert, no acute distress  HENT: Oral mucosa and posterior oropharynx normal, moist mucous membranes  NECK: No adenopathy,masses or thyromegaly  RESP: lungs clear to auscultation bilaterally  CV: regular rate and rhythm, no murmur, rub or gallop  ABDOMEN: normal bowel sounds, soft, nontender, no hepatosplenomegaly or other masses  LYMPHATICS: No cervical, or supraclavicular adenopathy  SKIN: no suspicious lesions or rashes  NEURO: Alert, oriented x 3, speech and mentation normal        I&O:    Date 08/23/22 0700 - 08/24/22 0659   Shift 4621-1570 3820-4655 0474-1707 24 Hour Total   INTAKE   P.O. 0   0   Shift Total(mL/kg) 0(0)   0(0)   OUTPUT   Shift Total(mL/kg)       Weight (kg) 101.6 101.6 101.6 101.6       Data Review:    Imaging: personally reviewed  Chest CT  EXAM: CT CHEST PULMONARY EMBOLISM W CONTRAST  LOCATION: [facility]  DATE/TIME: 8/21/2022 6:24 PM     INDICATION: Dyspnea on exertion. Chest pain.  COMPARISON: None.  TECHNIQUE: CT chest pulmonary angiogram during arterial phase injection of IV contrast. Multiplanar reformats and MIP reconstructions were performed. Dose reduction techniques were used.   CONTRAST: 100ml Isovue 370      FINDINGS:  ANGIOGRAM CHEST: Pulmonary arteries are normal caliber and negative for pulmonary emboli. Thoracic aorta is negative for dissection. No CT evidence of right heart strain.     LUNGS AND PLEURA: 1.6 cm cavitary lesion within the periphery of the right upper lobe with multiple small perilesional nodules. Intermediate uniform wall with some fluid in the cavity. Findings most likely inflammatory. Differential diagnosis would   include fungal diseases and such entities as " tuberculosis.     MEDIASTINUM/AXILLAE: Normal.     CORONARY ARTERY CALCIFICATION: Mild.     UPPER ABDOMEN: Normal.     MUSCULOSKELETAL: Normal.                                                                      IMPRESSION:  1.  No pulmonary embolism.     2.  1.6 cm cavitary lesion within the periphery of the right upper lobe with multiple small perilesional nodules. Intermediate uniform wall thickness with some fluid within the cavity. This most likely is infectious/inflammatory and fungal diseases as   well as tuberculosis would be included in the differential diagnosis.     NOTE: ABNORMAL REPORT     1.  THE DICTATION ABOVE DESCRIBES AN ABNORMALITY FOR WHICH FOLLOW-UP IS NEEDED.     Results for orders placed or performed during the hospital encounter of 08/21/22   Basic metabolic panel   Result Value Ref Range    Sodium 136 136 - 145 mmol/L    Potassium 3.9 3.5 - 5.0 mmol/L    Chloride 107 98 - 107 mmol/L    Carbon Dioxide (CO2) 26 22 - 31 mmol/L    Anion Gap 3 (L) 5 - 18 mmol/L    Urea Nitrogen 13 8 - 22 mg/dL    Creatinine 0.98 0.70 - 1.30 mg/dL    Calcium 8.9 8.5 - 10.5 mg/dL    Glucose 100 70 - 125 mg/dL    GFR Estimate 90 >60 mL/min/1.73m2     Lab Results   Component Value Date    WBC 7.7 08/22/2022    HGB 15.4 08/22/2022    HCT 42.1 08/22/2022    MCV 85 08/22/2022     08/22/2022

## 2022-08-23 NOTE — PROGRESS NOTES
Care Management Follow Up    Length of Stay (days): 2    Expected Discharge Date: 08/24/2022     Concerns to be Addressed:  Transfer to [facility]        Patient plan of care discussed at interdisciplinary rounds: Yes    Anticipated Discharge Disposition: transfer to [facility]    Anticipated Discharge Services: None    Anticipated Discharge DME: None    Education Provided on the Discharge Plan:  AVS per bedside RN.    Patient/Family in Agreement with the Plan: yes        Additional Information:  Chart reviewed. CM following for discharge needs. CM was updated that patient expected to transfer to [facility] for coronary angiography with possible percutaneous intervention tomorrow. Transportation TBD> CM will follow for additional needs.       Rebecca Pathak RN

## 2022-08-23 NOTE — PROVIDER NOTIFICATION
4:30 PM Text Paged Dr. Rizvi with Cardiology:  Per [facility] lab, hold lovenox tonight, do you want to start a heparin drip?LILIYA AG, RN     Spoke with Dr. Rizvi, will order heparin drip for unstable angina.     Updated Dr. Felix and Dr. Rizvi about pt not producing any sputum. We will get if possible but no productive cough.     5:04 PM Spoke with Dr. Felix. Lab called informing sputum sample was contaminated with saliva. Will attempt to get new sample if productive coughing starts.

## 2022-08-23 NOTE — PROGRESS NOTES
PEP therapy completed at bedside. Patient has strong non productive, dry cough. RT will continue to assist with technique for secretion removal.    Joss Cage, RT

## 2022-08-23 NOTE — PROGRESS NOTES
Patient with chest discomfort which has some suspicious features for a cardiac etiology and some other features that are somewhat atypical.  Troponin did go just over the threshold for normal which is of concern.  CT angiogram showed an area in his proximal circumflex which was difficult to visualize.  Dr. Tobias felt that it may be related to artifact but he could not exclude a severe lesion.  Fractional flow reserve is pending for this area.    The patient is also under isolation for possible tuberculosis given the cavitary lesion on his chest CT.  It would be ill advised to transfer him while ruling out the possibility of tuberculosis and he will be producing sputum to rule that out today.    The patient is not having any chest discomfort, has a normal echocardiogram so I think we can await the tuberculosis sputum samples before deciding if he needs coronary angiography.  If his FFR is anything other than completely normal, I would have a very low threshold for recommending coronary angiography.    Will continue to follow.

## 2022-08-23 NOTE — PROGRESS NOTES
Pt on RA with sat in the mid to high 90's. Pt received neb treatment and tolerated well.  Assessment below;     08/22/22 1203   Tech Time   $Tech Time (10 minute increments) 4   Nebulizer Assessment & Treatment   $RT Use ONLY Delivery Method Nebulizer - Additional   Nebulizer Device Mouthpiece   Pretreatment Heart Rate (beats/min) 77   Pretreatment Resp Rate (breaths/min) 16   Pretreatment O2 sats - (TCU only) 97   Breath Sounds Post-Respiratory Treatment   Posttreatment Heart Rate (beats/min) 71   Posttreatment Resp Rate (breaths/min) 18   Post treatment O2 Sats - (TCU only) 98   Breath Sounds Posttreatment All Fields All Fields   Breath Sounds Posttreatment All Fields no change     Will continue to monitor and assess pt.    Adeline Khoury, RT

## 2022-08-23 NOTE — PROGRESS NOTES
Infectious Diseases Progress Note  [facility]    Date of visit: 08/23/2022            Assessment and Recommendations:   Problem List:  1. Cavitary RUL lesion, unclear etiology   2. NSTEMI?    Recommendations:    1. Cavitary RUL lesion    Discontinue vancomycin    Continue levaquin     Send induced sputum sample     Continue to work with patient for expectorant    Follow fungal labs   Discussion:  57 year old with 1.6 cm cavitary lesion in periphery of the right upper lobe with multiple small perilesional nodules, uniform thickness within the cavity. Broad differential including Tuberculosis and fungal etiologies, labs pending. Less likely  covid-19 pneumonia as image findings not supportive of diagnosis although recently tested positive; Patient has risk factors for coccidioidosis given recent travel from endemic areas.No known risk factors for TB; AFB pending; if negative, patient may come off precautions and undergo further work up outpatient. Received levaquin and vanc x 3 days. discontinue vancomycin today; MRSA swab pending, if results positive, may restart vancomycin.    2. Concern for NSTEMI-see cardiology service note.     ID will follow. Thank you for consulting infectious disease associates.       Jonathan Etienne DO PGY2  Family Medicine Resident    Case discussed with attending:    Brian East MD  ID Staff  [phone]           Interval History:     Patient was seen and assessed at the bedside.     Jose Raul states he feels fine. Denies episodes of chest pain, shortness of breath, cough.     He states he was able to induce some sputum production after the nebulizer treatment. has sample at bedside.     Encouraged continued attempts at getting a sample.            Review of Systems:   CV: NEGATIVE for chest pain, palpitations or peripheral edema  C: NEGATIVE for fever, chills, change in weight  E/M: NEGATIVE for ear, mouth and throat problems  R: NEGATIVE for significant cough or SOB           Current Antimicrobials       Previous:           Physical Exam:   Ranges for vital signs:    Temp:  [97.9  F (36.6  C)-98.5  F (36.9  C)] 98.1  F (36.7  C)  Pulse:  [62-77] 77  Resp:  [16-20] 16  BP: (145-211)/() 148/86  SpO2:  [95 %-97 %] 96 %      Intake/Output Summary (Last 24 hours) at 8/23/2022 0944  Last data filed at 8/23/2022 0800  Gross per 24 hour   Intake 120 ml   Output --   Net 120 ml       Exam:  GENERAL:  well-developed, well-nourished, sitting in bed in no acute distress.   HENT:  Head is normocephalic, atraumatic. Oropharynx is moist without exudates or ulcers.  EYES:  Eyes have anicteric sclerae.    NECK:  Supple.  LUNGS:  Diminished breath sounds RUL field, stable to previous   CARDIOVASCULAR:  Regular rate and rhythm with no murmurs, gallops or rubs.  ABDOMEN:  Normal bowel sounds, soft, nontender.  EXT: Extremities warm and without edema.  SKIN:  No acute rashes.  Line is in place without any surrounding erythema.  NEUROLOGIC:  Grossly nonfocal.         Laboratory Data:     Creatinine   Date Value Ref Range Status   08/22/2022 0.98 0.70 - 1.30 mg/dL Final   08/21/2022 1.12 0.70 - 1.30 mg/dL Final     Creatinine POCT   Date Value Ref Range Status   08/22/2022 1.2 0.7 - 1.3 mg/dL Final     WBC Count   Date Value Ref Range Status   08/22/2022 7.7 4.0 - 11.0 10e3/uL Final   08/21/2022 6.6 4.0 - 11.0 10e3/uL Final     Hemoglobin   Date Value Ref Range Status   08/22/2022 15.4 13.3 - 17.7 g/dL Final     Platelet Count   Date Value Ref Range Status   08/22/2022 204 150 - 450 10e3/uL Final     CRP   Date Value Ref Range Status   08/21/2022 <0.1 0.0 - <0.8 mg/dL Final     Procalcitonin   Date Value Ref Range Status   08/22/2022 0.03 0.00 - 0.49 ng/mL Final     Comment:     Normal Procalcitonin in healthy populations: <=0.07  ng/ml     Procalcitonin Interpretation Guidelines:   Diagnosis of systemic bacterial infection/sepsis/septic shock:       <0.5 ng/mL:           Low risk of sepsis; localized  bacterial infection possible       >=0.5 to <=2.0 ng/mL: Sepsis possible. Interpret in context of the specific clinical background and condition of the patient. Retest PCL within 6-24 hours.       >2.0 ng.mL:           High risk of sepsis and/or septic shock.         Antibiotic therapy may be discontinued if the current PCL is <=0.5 ng/mL or the Change in PCL (using highest PCL this admission and current PCL) is >80%. Do PCL testing on patients being treated with antibiotics for sepsis every 1-2 days.     Diagnosis of lower respiratory tract infection(LRTI) and decision making on antibiotic therapy:      <0.1 ng/ml:            Bacterial infection very unlikely. Antibiotic therapy strongly discouraged.      0.1 to 0.25 ng/mL :    Bacterial infection unlikely. Antibiotic therapy discouraged.      0.26 to 0.5 ng/ml:     Bacterial infection likely. Antibiotic therapy encouraged.      >0.5 ng/mL:            Bacterial infection very likely. Antibiotic therapy strongly encouraged.                   If antibiotics are not started for suspected LRTI, repeat PCL within 6-24 hours if symptoms persist or worsen.       Antibiotic therapy for LRTI may be discontinued if the PCL is <=0.25 ng/ml or the Change in PCL is >80%       Do PCL testing on patients being treated with antibiotics for LRTI every 1-2 days.     Decisions regarding antibiotic therapy should not be based solely on PCL concentrations. PCL results should be used in conjunction with the laboratory findings and clinical signs and be interpreted in the context of the patient's clinical situation.     AST   Date Value Ref Range Status   08/21/2022 20 0 - 40 U/L Final     ALT   Date Value Ref Range Status   08/21/2022 28 0 - 45 U/L Final     Alkaline Phosphatase   Date Value Ref Range Status   08/21/2022 54 45 - 120 U/L Final     Bilirubin Total   Date Value Ref Range Status   08/21/2022 2.0 (H) 0.0 - 1.0 mg/dL Final     Lab Results   Component Value Date      08/22/2022    POTASSIUM 3.9 08/22/2022    CHLORIDE 107 08/22/2022    CO2 26 08/22/2022    BUN 13 08/22/2022     08/22/2022       Culture data:            Imaging:        ===============================  Attestation:  This patient has been seen and evaluated by me, Brian East MD.  Discussed with the Resident and agree with the findings and recommendations in this note.     I have reviewed today's Medications, Vital Signs and Labs.    No new events overnight. Cardiology evaluating for possible coronary artery disease. Difficulty making sputum even with induction. One stringy sample produced this morning. This should be sent to lab as induced sputum can have thinner consistency. Will try to get 2 more samples to r/out TB and to assess for other infectious etiologies  -continue levofloxacin  -discontinue vancomycin  -await fungal studies    Brian East MD  [facility]  Direct messaging: [facility] Paging  On-Call ID provider: [phone], option: 9

## 2022-08-23 NOTE — PROGRESS NOTES
Fractional flow reserve completed on CT angiogram and suggest significant obstruction in the proximal circumflex vessel.    Hopefully can get sputum's to rule out tuberculosis although pulmonologist feels very low likelihood that he would have tuberculosis.    We will plan coronary angiography with possible percutaneous intervention tomorrow.  I have discussed this with the patient including potential risks which include stroke, myocardial infarction, and death.  Patient is agreeable to proceed.    Spoke with patient for 12 minutes regarding results and angiogram.  Phone call from 12:50 PM to 1:02 PM.

## 2022-08-23 NOTE — PLAN OF CARE
Problem: Chest Pain  Goal: Resolution of Chest Pain Symptoms  Outcome: Ongoing, Progressing   Denies chest pain or shortness of breath. Up independently ambulating in room. VSS. LS clear. IS use encouraged. Induced sputum collected and sent to lab. Will continue encouraging deep breath and cough to obtain more sputum sample if possible. Discussed with Intensivist no bronchoscopy today and okay to eat. ID following on IV antibiotics Vancomycin. Cardiology awaiting Cardiac CT result. Plan Coronary angiogram tomorrow. Will monitor and continue plan of care.

## 2022-08-23 NOTE — PROGRESS NOTES
[facility] MEDICINE PROGRESS NOTE      SUMMARY:  57 year old male on hospital day number 2 after being admitted for   Chest pain/pressure.   Pts past medical history includes recent covid infection, secondhand tobacco exopsure, HTN.  On admission he was diagnosed with a 1.6 cm cavitary lung lesion.    Problem list:    1.  1.6 cm cavitary lung lesion: levaquin; continue   Pulmonary input appreciated; sputums sent today per nurse   1 is for AFB and 1 is for other sputum testing; will collect at least   1 more; testing for cocci, MRSA pending; bronch was discussed though   Due to possible ischemic lesion will postpone    2.  CAD/NSTEMI: cardiology input appreciated; pending possible cardiac   Cath tomorrow for intervention on circumflex lesion; check lipids   lovenox   3.  Essential HTN:  Continue losartan  4.  COVID-19: positive on 8/5  4.  dvt  Prevention: lovenox full dose  5.  Disposition: pending rule out TB, cardiac work up  6.  Code status: full           Sandra Felix MD  [facility] Medicine  [facility]  Phone: #[phone]    Interval History/Subjective: no chest pain overnight; coronary imaging  Reviewed; I called lab regarding sputums; he will need at least 1 more  Sent to make 3    Physical Exam/Objective:  Temp:  [97.9  F (36.6  C)-98.5  F (36.9  C)] 98.1  F (36.7  C)  Pulse:  [64-77] 77  Resp:  [16-18] 16  BP: (145-167)/() 148/86  SpO2:  [95 %-96 %] 96 %  Body mass index is 31.24 kg/m .    GENERAL:  Alert, appears comfortable, in no acute distress, appears stated age   HEAD:  Normocephalic, without obvious abnormality, atraumatic   EYES:  PERRL, conjunctiva/corneas clear, no scleral icterus, EOM's intact   NOSE: Nares normal, septum midline, mucosa normal, no drainage   THROAT: Lips, mucosa, and tongue normal; teeth and gums normal, mouth moist   NECK: Supple, symmetrical, trachea midline   BACK:   Symmetric, no curvature, ROM  normal   LUNGS:   Clear to auscultation bilaterally, no rales, rhonchi, or wheezing, symmetric chest rise on inhalation, respirations unlabored   CHEST WALL:  No tenderness or deformity   HEART:  Regular rate and rhythm, S1 and S2 normal, no murmur, rub, or gallop    ABDOMEN:   Soft, non-tender, bowel sounds active all four quadrants, no masses, no organomegaly, no rebound or guarding   EXTREMITIES: Extremities normal, atraumatic, no cyanosis or edema    SKIN: Dry to touch, no exanthems in the visualized areas   NEURO: Alert, oriented x3, moves all four extremities freely   PSYCH: Cooperative, behavior is appropriate      Data reviewed today: I personally reviewed all new medications, labs, imaging/diagnostics reports over the past 24 hours. Pertinent findings include:    Imaging:   No results found for this or any previous visit (from the past 24 hour(s)).    Labs:  Most Recent 3 BMP's:Recent Labs   Lab Test 08/22/22  1408 08/22/22  0436 08/21/22  1613   NA  --  136 139   POTASSIUM  --  3.9 3.9   CHLORIDE  --  107 106   CO2  --  26 28   BUN  --  13 14   CR 1.2 0.98 1.12   ANIONGAP  --  3* 5   LEAH  --  8.9 9.5   GLC  --  100 98       Medications:   Personally Reviewed.  Medications     - MEDICATION INSTRUCTIONS -         aspirin  324 mg Oral Daily     levofloxacin  500 mg Intravenous Q24H     losartan  50 mg Oral QAM     sodium chloride (PF)  3 mL Intracatheter Q8H

## 2022-08-24 ENCOUNTER — HOSPITAL ENCOUNTER (INPATIENT)
Facility: HOSPITAL | Age: 57
LOS: 1 days | Discharge: HOME | End: 2022-08-25
Attending: INTERNAL MEDICINE | Admitting: INTERNAL MEDICINE
Payer: COMMERCIAL

## 2022-08-24 VITALS
TEMPERATURE: 98.1 F | WEIGHT: 223.9 LBS | OXYGEN SATURATION: 100 % | SYSTOLIC BLOOD PRESSURE: 157 MMHG | HEART RATE: 63 BPM | HEIGHT: 71 IN | BODY MASS INDEX: 31.35 KG/M2 | DIASTOLIC BLOOD PRESSURE: 99 MMHG | RESPIRATION RATE: 18 BRPM

## 2022-08-24 DIAGNOSIS — I10 ESSENTIAL HYPERTENSION: ICD-10-CM

## 2022-08-24 DIAGNOSIS — I21.4 NSTEMI (NON-ST ELEVATED MYOCARDIAL INFARCTION) (H): Primary | ICD-10-CM

## 2022-08-24 DIAGNOSIS — J85.2 ABSCESS OF UPPER LOBE OF RIGHT LUNG WITHOUT PNEUMONIA (H): ICD-10-CM

## 2022-08-24 PROBLEM — Z98.61 PERCUTANEOUS TRANSLUMINAL CORONARY ANGIOPLASTY STATUS: Status: ACTIVE | Noted: 2022-08-24

## 2022-08-24 LAB
ABO/RH(D): NORMAL
ACT BLD: 233 SECONDS (ref 74–150)
ACT BLD: 250 SECONDS (ref 74–150)
ANA SER QL IF: NEGATIVE
ANCA AB PATTERN SER IF-IMP: NORMAL
ANTIBODY SCREEN: NEGATIVE
C-ANCA TITR SER IF: NORMAL {TITER}
CREAT SERPL-MCNC: 0.94 MG/DL (ref 0.7–1.3)
GALACTOMANNAN AG SERPL QL IA: NEGATIVE
GALACTOMANNAN AG SPEC IA-ACNC: 0.03
GFR SERPL CREATININE-BSD FRML MDRD: >90 ML/MIN/1.73M2
SPECIMEN EXPIRATION DATE: NORMAL
TROPONIN I SERPL-MCNC: 0.4 NG/ML (ref 0–0.29)
UFH PPP CHRO-ACNC: 0.44 IU/ML

## 2022-08-24 PROCEDURE — 258N000003 HC RX IP 258 OP 636: Performed by: INTERNAL MEDICINE

## 2022-08-24 PROCEDURE — 4A023N7 MEASUREMENT OF CARDIAC SAMPLING AND PRESSURE, LEFT HEART, PERCUTANEOUS APPROACH: ICD-10-PCS | Performed by: INTERNAL MEDICINE

## 2022-08-24 PROCEDURE — 210N000001 HC R&B IMCU HEART CARE

## 2022-08-24 PROCEDURE — C1874 STENT, COATED/COV W/DEL SYS: HCPCS | Performed by: INTERNAL MEDICINE

## 2022-08-24 PROCEDURE — 84484 ASSAY OF TROPONIN QUANT: CPT | Performed by: INTERNAL MEDICINE

## 2022-08-24 PROCEDURE — C1725 CATH, TRANSLUMIN NON-LASER: HCPCS | Performed by: INTERNAL MEDICINE

## 2022-08-24 PROCEDURE — 250N000011 HC RX IP 250 OP 636: Performed by: INTERNAL MEDICINE

## 2022-08-24 PROCEDURE — 86850 RBC ANTIBODY SCREEN: CPT | Performed by: INTERNAL MEDICINE

## 2022-08-24 PROCEDURE — 250N000013 HC RX MED GY IP 250 OP 250 PS 637: Performed by: FAMILY MEDICINE

## 2022-08-24 PROCEDURE — C1894 INTRO/SHEATH, NON-LASER: HCPCS | Performed by: INTERNAL MEDICINE

## 2022-08-24 PROCEDURE — 93458 L HRT ARTERY/VENTRICLE ANGIO: CPT | Performed by: INTERNAL MEDICINE

## 2022-08-24 PROCEDURE — 250N000013 HC RX MED GY IP 250 OP 250 PS 637: Performed by: INTERNAL MEDICINE

## 2022-08-24 PROCEDURE — 92928 PRQ TCAT PLMT NTRAC ST 1 LES: CPT | Mod: LC | Performed by: INTERNAL MEDICINE

## 2022-08-24 PROCEDURE — 93005 ELECTROCARDIOGRAM TRACING: CPT

## 2022-08-24 PROCEDURE — 99152 MOD SED SAME PHYS/QHP 5/>YRS: CPT | Performed by: INTERNAL MEDICINE

## 2022-08-24 PROCEDURE — B2111ZZ FLUOROSCOPY OF MULTIPLE CORONARY ARTERIES USING LOW OSMOLAR CONTRAST: ICD-10-PCS | Performed by: INTERNAL MEDICINE

## 2022-08-24 PROCEDURE — 85520 HEPARIN ASSAY: CPT | Performed by: EMERGENCY MEDICINE

## 2022-08-24 PROCEDURE — 99153 MOD SED SAME PHYS/QHP EA: CPT | Performed by: INTERNAL MEDICINE

## 2022-08-24 PROCEDURE — 93010 ELECTROCARDIOGRAM REPORT: CPT | Performed by: INTERNAL MEDICINE

## 2022-08-24 PROCEDURE — 272N000001 HC OR GENERAL SUPPLY STERILE: Performed by: INTERNAL MEDICINE

## 2022-08-24 PROCEDURE — 36415 COLL VENOUS BLD VENIPUNCTURE: CPT | Performed by: INTERNAL MEDICINE

## 2022-08-24 PROCEDURE — 999N000157 HC STATISTIC RCP TIME EA 10 MIN

## 2022-08-24 PROCEDURE — 027034Z DILATION OF CORONARY ARTERY, ONE ARTERY WITH DRUG-ELUTING INTRALUMINAL DEVICE, PERCUTANEOUS APPROACH: ICD-10-PCS | Performed by: INTERNAL MEDICINE

## 2022-08-24 PROCEDURE — 82565 ASSAY OF CREATININE: CPT | Performed by: INTERNAL MEDICINE

## 2022-08-24 PROCEDURE — C1887 CATHETER, GUIDING: HCPCS | Performed by: INTERNAL MEDICINE

## 2022-08-24 PROCEDURE — 93458 L HRT ARTERY/VENTRICLE ANGIO: CPT | Mod: 26 | Performed by: INTERNAL MEDICINE

## 2022-08-24 PROCEDURE — 99232 SBSQ HOSP IP/OBS MODERATE 35: CPT | Performed by: INTERNAL MEDICINE

## 2022-08-24 PROCEDURE — 99239 HOSP IP/OBS DSCHRG MGMT >30: CPT | Performed by: FAMILY MEDICINE

## 2022-08-24 PROCEDURE — 94799 UNLISTED PULMONARY SVC/PX: CPT

## 2022-08-24 PROCEDURE — C1769 GUIDE WIRE: HCPCS | Performed by: INTERNAL MEDICINE

## 2022-08-24 PROCEDURE — 86901 BLOOD TYPING SEROLOGIC RH(D): CPT | Performed by: INTERNAL MEDICINE

## 2022-08-24 PROCEDURE — C9600 PERC DRUG-EL COR STENT SING: HCPCS | Performed by: INTERNAL MEDICINE

## 2022-08-24 PROCEDURE — 85347 COAGULATION TIME ACTIVATED: CPT

## 2022-08-24 DEVICE — STENT CORONARY DES SYNERGY XD MR US 4.00X38MM H7493941838400: Type: IMPLANTABLE DEVICE | Status: FUNCTIONAL

## 2022-08-24 RX ORDER — ACETAMINOPHEN 650 MG/1
650 SUPPOSITORY RECTAL EVERY 6 HOURS PRN
Status: CANCELLED | OUTPATIENT
Start: 2022-08-24

## 2022-08-24 RX ORDER — ACETAMINOPHEN 650 MG/1
650 SUPPOSITORY RECTAL EVERY 6 HOURS PRN
Status: DISCONTINUED | OUTPATIENT
Start: 2022-08-24 | End: 2022-08-25 | Stop reason: HOSPADM

## 2022-08-24 RX ORDER — METOPROLOL TARTRATE 1 MG/ML
5 INJECTION, SOLUTION INTRAVENOUS
Status: DISCONTINUED | OUTPATIENT
Start: 2022-08-24 | End: 2022-08-25 | Stop reason: HOSPADM

## 2022-08-24 RX ORDER — FLUMAZENIL 0.1 MG/ML
0.2 INJECTION, SOLUTION INTRAVENOUS
Status: ACTIVE | OUTPATIENT
Start: 2022-08-24 | End: 2022-08-25

## 2022-08-24 RX ORDER — LEVOFLOXACIN 5 MG/ML
500 INJECTION, SOLUTION INTRAVENOUS EVERY 24 HOURS
Status: DISCONTINUED | OUTPATIENT
Start: 2022-08-24 | End: 2022-08-24 | Stop reason: HOSPADM

## 2022-08-24 RX ORDER — ONDANSETRON 4 MG/1
4 TABLET, ORALLY DISINTEGRATING ORAL EVERY 6 HOURS PRN
Status: DISCONTINUED | OUTPATIENT
Start: 2022-08-24 | End: 2022-08-24

## 2022-08-24 RX ORDER — SODIUM CHLORIDE 9 MG/ML
INJECTION, SOLUTION INTRAVENOUS CONTINUOUS
Status: DISCONTINUED | OUTPATIENT
Start: 2022-08-24 | End: 2022-08-24 | Stop reason: HOSPADM

## 2022-08-24 RX ORDER — HYDRALAZINE HYDROCHLORIDE 20 MG/ML
10 INJECTION INTRAMUSCULAR; INTRAVENOUS EVERY 6 HOURS PRN
Status: CANCELLED | OUTPATIENT
Start: 2022-08-24

## 2022-08-24 RX ORDER — LEVOFLOXACIN 5 MG/ML
500 INJECTION, SOLUTION INTRAVENOUS EVERY 24 HOURS
Status: CANCELLED | OUTPATIENT
Start: 2022-08-25

## 2022-08-24 RX ORDER — LOSARTAN POTASSIUM 50 MG/1
50 TABLET ORAL EVERY MORNING
Status: DISCONTINUED | OUTPATIENT
Start: 2022-08-25 | End: 2022-08-25 | Stop reason: HOSPADM

## 2022-08-24 RX ORDER — HEPARIN SODIUM 10000 [USP'U]/100ML
0-5000 INJECTION, SOLUTION INTRAVENOUS CONTINUOUS
Status: CANCELLED | OUTPATIENT
Start: 2022-08-24

## 2022-08-24 RX ORDER — ONDANSETRON 4 MG/1
4 TABLET, ORALLY DISINTEGRATING ORAL EVERY 6 HOURS PRN
Status: DISCONTINUED | OUTPATIENT
Start: 2022-08-24 | End: 2022-08-25 | Stop reason: HOSPADM

## 2022-08-24 RX ORDER — NALOXONE HYDROCHLORIDE 0.4 MG/ML
0.4 INJECTION, SOLUTION INTRAMUSCULAR; INTRAVENOUS; SUBCUTANEOUS
Status: ACTIVE | OUTPATIENT
Start: 2022-08-24 | End: 2022-08-25

## 2022-08-24 RX ORDER — FENTANYL CITRATE 50 UG/ML
INJECTION, SOLUTION INTRAMUSCULAR; INTRAVENOUS
Status: DISCONTINUED | OUTPATIENT
Start: 2022-08-24 | End: 2022-08-24 | Stop reason: HOSPADM

## 2022-08-24 RX ORDER — ACETAMINOPHEN 325 MG/1
650 TABLET ORAL EVERY 4 HOURS PRN
Status: DISCONTINUED | OUTPATIENT
Start: 2022-08-24 | End: 2022-08-24

## 2022-08-24 RX ORDER — HYDRALAZINE HYDROCHLORIDE 20 MG/ML
10 INJECTION INTRAMUSCULAR; INTRAVENOUS EVERY 6 HOURS PRN
Status: DISCONTINUED | OUTPATIENT
Start: 2022-08-24 | End: 2022-08-24

## 2022-08-24 RX ORDER — NALOXONE HYDROCHLORIDE 0.4 MG/ML
0.2 INJECTION, SOLUTION INTRAMUSCULAR; INTRAVENOUS; SUBCUTANEOUS
Status: ACTIVE | OUTPATIENT
Start: 2022-08-24 | End: 2022-08-25

## 2022-08-24 RX ORDER — ASPIRIN 81 MG/1
81 TABLET ORAL DAILY
Status: DISCONTINUED | OUTPATIENT
Start: 2022-08-25 | End: 2022-08-25 | Stop reason: HOSPADM

## 2022-08-24 RX ORDER — OXYCODONE HYDROCHLORIDE 5 MG/1
10 TABLET ORAL EVERY 4 HOURS PRN
Status: DISCONTINUED | OUTPATIENT
Start: 2022-08-24 | End: 2022-08-25 | Stop reason: HOSPADM

## 2022-08-24 RX ORDER — ROSUVASTATIN CALCIUM 40 MG/1
40 TABLET, COATED ORAL EVERY EVENING
Status: DISCONTINUED | OUTPATIENT
Start: 2022-08-24 | End: 2022-08-25 | Stop reason: HOSPADM

## 2022-08-24 RX ORDER — NITROGLYCERIN 0.4 MG/1
TABLET SUBLINGUAL
Status: DISCONTINUED | OUTPATIENT
Start: 2022-08-24 | End: 2022-08-24 | Stop reason: HOSPADM

## 2022-08-24 RX ORDER — HEPARIN SODIUM 10000 [USP'U]/100ML
0-5000 INJECTION, SOLUTION INTRAVENOUS CONTINUOUS
Status: DISCONTINUED | OUTPATIENT
Start: 2022-08-24 | End: 2022-08-24

## 2022-08-24 RX ORDER — HYDRALAZINE HYDROCHLORIDE 20 MG/ML
10 INJECTION INTRAMUSCULAR; INTRAVENOUS EVERY 4 HOURS PRN
Status: DISCONTINUED | OUTPATIENT
Start: 2022-08-24 | End: 2022-08-25 | Stop reason: HOSPADM

## 2022-08-24 RX ORDER — ASPIRIN 81 MG/1
324 TABLET, CHEWABLE ORAL DAILY
Status: CANCELLED | OUTPATIENT
Start: 2022-08-24

## 2022-08-24 RX ORDER — ONDANSETRON 2 MG/ML
4 INJECTION INTRAMUSCULAR; INTRAVENOUS EVERY 6 HOURS PRN
Status: DISCONTINUED | OUTPATIENT
Start: 2022-08-24 | End: 2022-08-25 | Stop reason: HOSPADM

## 2022-08-24 RX ORDER — SODIUM CHLORIDE 9 MG/ML
INJECTION, SOLUTION INTRAVENOUS CONTINUOUS
Status: ACTIVE | OUTPATIENT
Start: 2022-08-24 | End: 2022-08-24

## 2022-08-24 RX ORDER — ASPIRIN 81 MG/1
324 TABLET, CHEWABLE ORAL DAILY
Status: DISCONTINUED | OUTPATIENT
Start: 2022-08-25 | End: 2022-08-24 | Stop reason: DRUGHIGH

## 2022-08-24 RX ORDER — LIDOCAINE 40 MG/G
CREAM TOPICAL
Status: CANCELLED | OUTPATIENT
Start: 2022-08-24

## 2022-08-24 RX ORDER — ASPIRIN 325 MG
325 TABLET ORAL ONCE
Status: DISCONTINUED | OUTPATIENT
Start: 2022-08-24 | End: 2022-08-24

## 2022-08-24 RX ORDER — FENTANYL CITRATE 50 UG/ML
25 INJECTION, SOLUTION INTRAMUSCULAR; INTRAVENOUS
Status: DISCONTINUED | OUTPATIENT
Start: 2022-08-24 | End: 2022-08-24 | Stop reason: CLARIF

## 2022-08-24 RX ORDER — ONDANSETRON 4 MG/1
4 TABLET, ORALLY DISINTEGRATING ORAL EVERY 6 HOURS PRN
Status: CANCELLED | OUTPATIENT
Start: 2022-08-24

## 2022-08-24 RX ORDER — METOPROLOL TARTRATE 25 MG/1
25 TABLET, FILM COATED ORAL 2 TIMES DAILY
Status: DISCONTINUED | OUTPATIENT
Start: 2022-08-24 | End: 2022-08-25 | Stop reason: HOSPADM

## 2022-08-24 RX ORDER — ASPIRIN 325 MG
325 TABLET ORAL ONCE
Status: DISCONTINUED | OUTPATIENT
Start: 2022-08-24 | End: 2022-08-24 | Stop reason: HOSPADM

## 2022-08-24 RX ORDER — OXYCODONE HYDROCHLORIDE 5 MG/1
5 TABLET ORAL EVERY 4 HOURS PRN
Status: DISCONTINUED | OUTPATIENT
Start: 2022-08-24 | End: 2022-08-25 | Stop reason: HOSPADM

## 2022-08-24 RX ORDER — LIDOCAINE 40 MG/G
CREAM TOPICAL
Status: DISCONTINUED | OUTPATIENT
Start: 2022-08-24 | End: 2022-08-24 | Stop reason: HOSPADM

## 2022-08-24 RX ORDER — METOPROLOL TARTRATE 25 MG/1
50-100 TABLET, FILM COATED ORAL
Status: DISCONTINUED | OUTPATIENT
Start: 2022-08-24 | End: 2022-08-25 | Stop reason: HOSPADM

## 2022-08-24 RX ORDER — ONDANSETRON 2 MG/ML
4 INJECTION INTRAMUSCULAR; INTRAVENOUS EVERY 6 HOURS PRN
Status: CANCELLED | OUTPATIENT
Start: 2022-08-24

## 2022-08-24 RX ORDER — HEPARIN SODIUM 1000 [USP'U]/ML
INJECTION, SOLUTION INTRAVENOUS; SUBCUTANEOUS
Status: DISCONTINUED | OUTPATIENT
Start: 2022-08-24 | End: 2022-08-24 | Stop reason: HOSPADM

## 2022-08-24 RX ORDER — LEVOFLOXACIN 750 MG/1
750 TABLET, FILM COATED ORAL EVERY 24 HOURS
Status: DISCONTINUED | OUTPATIENT
Start: 2022-08-24 | End: 2022-08-25 | Stop reason: HOSPADM

## 2022-08-24 RX ORDER — ACETAMINOPHEN 325 MG/1
650 TABLET ORAL EVERY 6 HOURS PRN
Status: CANCELLED | OUTPATIENT
Start: 2022-08-24

## 2022-08-24 RX ORDER — ASPIRIN 81 MG/1
243 TABLET, CHEWABLE ORAL ONCE
Status: DISCONTINUED | OUTPATIENT
Start: 2022-08-24 | End: 2022-08-24 | Stop reason: HOSPADM

## 2022-08-24 RX ORDER — ONDANSETRON 2 MG/ML
4 INJECTION INTRAMUSCULAR; INTRAVENOUS EVERY 6 HOURS PRN
Status: DISCONTINUED | OUTPATIENT
Start: 2022-08-24 | End: 2022-08-24

## 2022-08-24 RX ORDER — LOSARTAN POTASSIUM 50 MG/1
50 TABLET ORAL EVERY MORNING
Status: CANCELLED | OUTPATIENT
Start: 2022-08-25

## 2022-08-24 RX ORDER — NITROGLYCERIN 5 MG/ML
VIAL (ML) INTRAVENOUS
Status: DISCONTINUED | OUTPATIENT
Start: 2022-08-24 | End: 2022-08-24 | Stop reason: HOSPADM

## 2022-08-24 RX ORDER — ATROPINE SULFATE 0.1 MG/ML
0.5 INJECTION INTRAVENOUS
Status: ACTIVE | OUTPATIENT
Start: 2022-08-24 | End: 2022-08-25

## 2022-08-24 RX ORDER — METOPROLOL TARTRATE 50 MG
50-100 TABLET ORAL
Status: CANCELLED | OUTPATIENT
Start: 2022-08-24

## 2022-08-24 RX ORDER — NITROGLYCERIN 0.4 MG/1
0.4 TABLET SUBLINGUAL EVERY 5 MIN PRN
Status: DISCONTINUED | OUTPATIENT
Start: 2022-08-24 | End: 2022-08-25 | Stop reason: HOSPADM

## 2022-08-24 RX ORDER — ACETAMINOPHEN 325 MG/1
650 TABLET ORAL EVERY 6 HOURS PRN
Status: DISCONTINUED | OUTPATIENT
Start: 2022-08-24 | End: 2022-08-25 | Stop reason: HOSPADM

## 2022-08-24 RX ORDER — LIDOCAINE 40 MG/G
CREAM TOPICAL
Status: DISCONTINUED | OUTPATIENT
Start: 2022-08-24 | End: 2022-08-25 | Stop reason: HOSPADM

## 2022-08-24 RX ADMIN — ROSUVASTATIN 40 MG: 40 TABLET, FILM COATED ORAL at 20:09

## 2022-08-24 RX ADMIN — LEVOFLOXACIN 750 MG: 750 TABLET, FILM COATED ORAL at 18:57

## 2022-08-24 RX ADMIN — SODIUM CHLORIDE: 9 INJECTION, SOLUTION INTRAVENOUS at 18:08

## 2022-08-24 RX ADMIN — ASPIRIN 81 MG CHEWABLE TABLET 324 MG: 81 TABLET CHEWABLE at 08:21

## 2022-08-24 RX ADMIN — METOPROLOL TARTRATE 25 MG: 25 TABLET, FILM COATED ORAL at 19:00

## 2022-08-24 RX ADMIN — SODIUM CHLORIDE: 9 INJECTION, SOLUTION INTRAVENOUS at 15:02

## 2022-08-24 RX ADMIN — LOSARTAN POTASSIUM 50 MG: 50 TABLET, FILM COATED ORAL at 08:22

## 2022-08-24 RX ADMIN — SODIUM CHLORIDE: 9 INJECTION, SOLUTION INTRAVENOUS at 14:51

## 2022-08-24 RX ADMIN — LEVOFLOXACIN 500 MG: 5 INJECTION, SOLUTION INTRAVENOUS at 09:36

## 2022-08-24 RX ADMIN — HEPARIN SODIUM 1200 UNITS/HR: 10000 INJECTION, SOLUTION INTRAVENOUS at 08:23

## 2022-08-24 RX ADMIN — ACETAMINOPHEN 650 MG: 325 TABLET ORAL at 20:09

## 2022-08-24 RX ADMIN — HYDRALAZINE HYDROCHLORIDE 10 MG: 20 INJECTION, SOLUTION INTRAMUSCULAR; INTRAVENOUS at 21:15

## 2022-08-24 ASSESSMENT — ACTIVITIES OF DAILY LIVING (ADL)
CHANGE_IN_FUNCTIONAL_STATUS_SINCE_ONSET_OF_CURRENT_ILLNESS/INJURY: NO
ADLS_ACUITY_SCORE: 20
DIFFICULTY_EATING/SWALLOWING: NO
ADLS_ACUITY_SCORE: 35
ADLS_ACUITY_SCORE: 20
ADLS_ACUITY_SCORE: 20
WALKING_OR_CLIMBING_STAIRS_DIFFICULTY: NO
TOILETING_ISSUES: NO
DOING_ERRANDS_INDEPENDENTLY_DIFFICULTY: NO
ADLS_ACUITY_SCORE: 20
CONCENTRATING,_REMEMBERING_OR_MAKING_DECISIONS_DIFFICULTY: NO
ADLS_ACUITY_SCORE: 20
WEAR_GLASSES_OR_BLIND: YES
FALL_HISTORY_WITHIN_LAST_SIX_MONTHS: NO
ADLS_ACUITY_SCORE: 35
ADLS_ACUITY_SCORE: 20
DRESSING/BATHING_DIFFICULTY: NO
ADLS_ACUITY_SCORE: 20

## 2022-08-24 ASSESSMENT — EJECTION FRACTION: EF_VALUE: .06

## 2022-08-24 NOTE — PROGRESS NOTES
Infectious Diseases Progress Note  [facility]    Date of visit: 08/24/2022            Assessment and Recommendations:   Problem List:  1. Cavitary RUL lesion, unclear etiology   2. NSTEMI?    Recommendations:    1. Cavitary RUL lesion    Continue levaquin, plan 2 wk course     Unable to produce sputum, expectorated or induced.     Follow fungal labs     Will need ID f/up in about 2 weeks, with re-imaging    Likelihood of TB very low based on risk factors and symptoms      Discussion:  57 year old with 1.6 cm cavitary lesion in periphery of the right upper lobe with multiple small perilesional nodules, uniform thickness within the cavity. No respiratory symptoms. No chronic B-symptoms. Risk factors for cocci infection. Also s/p covid-19 infection.     2. Concern for NSTEMI-see cardiology service note. Transferring to [facility] for cardiac cath    Brian East MD  [facility]  Direct messaging: [employer]  On-Call ID provider: [phone], option: [phone]  ==================================         Interval History:     Wife at bedside. Continues to feel well. Unable to produce any sputum. Sample sent to lab contaminated with squamous epithelial cells. Plan for transfer to [facility]         Review of Systems:   CV: NEGATIVE for chest pain, palpitations or peripheral edema  C: NEGATIVE for fever, chills, change in weight  E/M: NEGATIVE for ear, mouth and throat problems  R: NEGATIVE for significant cough or SOB          Current Antimicrobials   Levofloxacin     Previous:  Vancomycin          Physical Exam:   Ranges for vital signs:    Temp:  [98  F (36.7  C)-98.3  F (36.8  C)] 98.1  F (36.7  C)  Pulse:  [63-79] 63  Resp:  [16-18] 18  BP: (134-169)/(77-99) 157/99  SpO2:  [95 %-100 %] 100 %      Intake/Output Summary (Last 24 hours) at 8/23/2022 0944  Last data filed at 8/23/2022 0800  Gross per 24 hour   Intake 120 ml   Output --   Net 120 ml       Exam:  GENERAL:   well-developed, well-nourished, standing in no acute distress.   HENT:  Head is normocephalic, atraumatic.   EYES:  Eyes have anicteric sclerae.    LUNGS:  Normal resp pattern  EXT: Extremities warm and without edema.  SKIN:  No acute rashes.    NEUROLOGIC:  Grossly nonfocal.         Laboratory Data:     Creatinine   Date Value Ref Range Status   08/24/2022 0.94 0.70 - 1.30 mg/dL Final   08/22/2022 0.98 0.70 - 1.30 mg/dL Final   08/21/2022 1.12 0.70 - 1.30 mg/dL Final     Creatinine POCT   Date Value Ref Range Status   08/22/2022 1.2 0.7 - 1.3 mg/dL Final     WBC Count   Date Value Ref Range Status   08/23/2022 7.6 4.0 - 11.0 10e3/uL Final   08/22/2022 7.7 4.0 - 11.0 10e3/uL Final   08/21/2022 6.6 4.0 - 11.0 10e3/uL Final     Hemoglobin   Date Value Ref Range Status   08/23/2022 15.5 13.3 - 17.7 g/dL Final     Platelet Count   Date Value Ref Range Status   08/23/2022 215 150 - 450 10e3/uL Final     CRP   Date Value Ref Range Status   08/21/2022 <0.1 0.0 - <0.8 mg/dL Final     Procalcitonin   Date Value Ref Range Status   08/22/2022 0.03 0.00 - 0.49 ng/mL Final     Comment:     Normal Procalcitonin in healthy populations: <=0.07  ng/ml     Procalcitonin Interpretation Guidelines:   Diagnosis of systemic bacterial infection/sepsis/septic shock:       <0.5 ng/mL:           Low risk of sepsis; localized bacterial infection possible       >=0.5 to <=2.0 ng/mL: Sepsis possible. Interpret in context of the specific clinical background and condition of the patient. Retest PCL within 6-24 hours.       >2.0 ng.mL:           High risk of sepsis and/or septic shock.         Antibiotic therapy may be discontinued if the current PCL is <=0.5 ng/mL or the Change in PCL (using highest PCL this admission and current PCL) is >80%. Do PCL testing on patients being treated with antibiotics for sepsis every 1-2 days.     Diagnosis of lower respiratory tract infection(LRTI) and decision making on antibiotic therapy:      <0.1 ng/ml:             Bacterial infection very unlikely. Antibiotic therapy strongly discouraged.      0.1 to 0.25 ng/mL :    Bacterial infection unlikely. Antibiotic therapy discouraged.      0.26 to 0.5 ng/ml:     Bacterial infection likely. Antibiotic therapy encouraged.      >0.5 ng/mL:            Bacterial infection very likely. Antibiotic therapy strongly encouraged.                   If antibiotics are not started for suspected LRTI, repeat PCL within 6-24 hours if symptoms persist or worsen.       Antibiotic therapy for LRTI may be discontinued if the PCL is <=0.25 ng/ml or the Change in PCL is >80%       Do PCL testing on patients being treated with antibiotics for LRTI every 1-2 days.     Decisions regarding antibiotic therapy should not be based solely on PCL concentrations. PCL results should be used in conjunction with the laboratory findings and clinical signs and be interpreted in the context of the patient's clinical situation.     AST   Date Value Ref Range Status   08/21/2022 20 0 - 40 U/L Final     ALT   Date Value Ref Range Status   08/21/2022 28 0 - 45 U/L Final     Alkaline Phosphatase   Date Value Ref Range Status   08/21/2022 54 45 - 120 U/L Final     Bilirubin Total   Date Value Ref Range Status   08/21/2022 2.0 (H) 0.0 - 1.0 mg/dL Final     Lab Results   Component Value Date     08/22/2022    POTASSIUM 3.9 08/22/2022    CHLORIDE 107 08/22/2022    CO2 26 08/22/2022    BUN 13 08/22/2022     08/22/2022       Culture data:   Reviewed          Imaging:     Results for orders placed or performed during the hospital encounter of 08/21/22   CT Chest Pulmonary Embolism w Contrast    Impression    IMPRESSION:  1.  No pulmonary embolism.    2.  1.6 cm cavitary lesion within the periphery of the right upper lobe with multiple small perilesional nodules. Intermediate uniform wall thickness with some fluid within the cavity. This most likely is infectious/inflammatory and fungal diseases as   well as  tuberculosis would be included in the differential diagnosis.    NOTE: ABNORMAL REPORT    1.  THE DICTATION ABOVE DESCRIBES AN ABNORMALITY FOR WHICH FOLLOW-UP IS NEEDED.    Radiologist Consult For Cardiology    Impression    IMPRESSION:    1.  Small cluster of right upper lobe nodules are again noted including the largest which is cavitary and measuring 1.4 cm.  2.  No new findings.  3.  Please refer to cardiologist's dictation for the cardiac CT report.   Echocardiogram Complete   Result Value Ref Range    LVEF  60-65%    CTA  ANGIOGRAM CORONARY ARTERY   Result Value Ref Range    BSA 1.00 m2      Attestation:  I have reviewed today's Medications, Vital Signs, and Labs. Recommendations discussed with pulmonary service.

## 2022-08-24 NOTE — PHARMACY-ADMISSION MEDICATION HISTORY
Pharmacy Note - Admission Medication History    Pertinent Provider Information: PTA medication list was updated by pharmacist at [facility] on 8/21/22. See that note for details.   ______________________________________________________________________      Brooke Perea RPH     8/24/2022     5:12 PM

## 2022-08-24 NOTE — PLAN OF CARE
Vss other than BP which is elevated in the 160's. BP meds given. A&O. Denies pain. Remains NPO status r/t to angio that will happen once pt is transferred to [place].    Pt adequate for transfer. AVS prepared and is transporting with pt. Pt transferring with 1 IV in the right hand and 1 PIV in the left arm. PIV running heparin at 1200 units/hr. All belongings packed and transporting to [place] via medical transport.

## 2022-08-24 NOTE — Clinical Note
Balloon inserted to circumflex and distal circumflex.
Balloon inserted to circumflex and distal circumflex.
Balloon inserted to circumflex and proximal circumflex.
Balloon inserted to circumflex and proximal circumflex.
Balloon prepped per 's instructions.  
Balloon removed intact.  
Catheter exchanged to right coronary artery. 
Check Implants.
Decision made to proceed with intervention. Family notified.   
Delivery system removed.  
Family has been updated.  
Heparin gtt stopped on call to CV lab at 2093
LCA Cine(s)  injected and visualized utilizing power injector system.
LCA Cine(s)  injected and visualized utilizing power injector system.
LV pressures recorded.  
Max pressure = 12 jere. Total duration = 20 seconds.      
Max pressure = 6 jere. Total duration = 20 seconds.      
Max pressure = 8 jere. Total duration = 14 seconds.      
R-Band utilized for closure of site.  Mechanical pressure applied applied by scrub person.  
RCA Cine(s)  injected and visualized utilizing power injector system.
Removed intact  
Sheath prepped and inserted in the right radial artery.  
Stent deployed in the proximal circumflex. Max pressure = 12 jere. Total duration = 30 seconds. 
Stent inserted over the wire.   
Stent prepped per 's instructions.  
The first balloon was inserted into the circumflex and distal circumflex.Max pressure = 12 jere. Total duration = 20 seconds.      
The first balloon was inserted into the circumflex and proximal circumflex.Max pressure = 12 jere. Total duration = 20 seconds.      
Total contrast given (ml) 263  
dry, intact, no bleeding and no hematoma. 
Patient has a history of alcohol abuse with cirrhosis.   - C/w with multivitamin, thiamine, folic acid   - Ativan for CIWA > 8   - Monitor for signs of Alcohol withdrawal

## 2022-08-24 NOTE — PRE-PROCEDURE
GENERAL PRE-PROCEDURE:   Procedure:  Coronary angiogram with possible PCI  Date/Time:  8/24/2022 2:42 PM    Written consent obtained?: Yes    Risks and benefits: Risks, benefits and alternatives were discussed    Consent given by:  Patient  Patient states understanding of procedure being performed: Yes    Patient's understanding of procedure matches consent: Yes    Procedure consent matches procedure scheduled: Yes    Expected level of sedation:  Moderate  Appropriately NPO:  Yes  ASA Class:  4 (chest pain, RUL cavitary mass, recent COVID-19 8/2022, HTN, mild coronary calcification on CT, Class I obesity; BMI 31.23kg/m2)  Mallampati  :  Grade 3- soft palate visible, posterior pharyngeal wall not visible  Lungs:  Lungs clear with good breath sounds bilaterally  Heart:  Normal heart sounds and rate  History & Physical reviewed:  History and physical reviewed and no updates needed  Statement of review:  I have reviewed the lab findings, diagnostic data, medications, and the plan for sedation

## 2022-08-24 NOTE — INTERVAL H&P NOTE
"I have reviewed the surgical (or preoperative) H&P that is linked to this encounter, and examined the patient. There are no significant changes    Clinical Conditions Present on Arrival:  Clinically Significant Risk Factors Present on Admission                   # Obesity: Estimated body mass index is 31.23 kg/m  as calculated from the following:    Height as of 8/22/22: 1.803 m (5' 11\").    Weight as of an earlier encounter on 8/24/22: 101.6 kg (223 lb 14.4 oz).       "

## 2022-08-24 NOTE — PLAN OF CARE
Goal Outcome Evaluation:      Pt transferred to [facility] [address] from [facility] for CA/P.PCI due to hx of chest and arm pain. Pt prepped and sent for procedure. Wife Jazmyn here for support.    Anna Gaston RN

## 2022-08-24 NOTE — DISCHARGE SUMMARY
[facility]  Hospitalist Discharge Summary      Date of Admission:  8/21/2022  Date of Discharge:  8/24/2022  Discharging Provider: Khanh Silva MD  Discharge Service: Hospitalist Service    Discharge Diagnoses    Chest pain/NSTEMI with proximal circumflex occlusion - needs transfer for angio  Right Upper Lobe Pulmonary Abscess  COVID - 19 positive on 8/5      Follow-ups Needed After Discharge   Additional follow-up instructions/to-do's follow up on AFB collection and other pulmonary lab results, possible inpatient bronch    Unresulted Labs Ordered in the Past 30 Days of this Admission     Date and Time Order Name Status Description    8/23/2022 10:43 AM Acid-Fast Bacilli Culture and Stain In process     8/22/2022  3:48 PM Histoplasma Capsulatum Agn Non Blood In process     8/22/2022  3:48 PM Histoplasma Capsulatum Antigen In process     8/22/2022  3:48 PM Blastomyces Agn Quant EIA Blood In process     8/22/2022  3:48 PM Blastomyces Agn Quant EIA Non Blood In process     8/22/2022  3:48 PM Coccidioides Agn Quant EIA Non Blood In process     8/22/2022  3:48 PM Coccidioides Agn Quant EIA Blood In process     8/22/2022  3:48 PM Aspergillus Galactomannan Antigen In process     8/22/2022  3:48 PM Blastomyces antibody ID In process     8/22/2022  3:48 PM Fungal Antibody by Immunodiffusion In process     8/22/2022  3:48 PM 1,3 Beta D glucan fungitell In process     8/22/2022  3:48 PM ANCA IgG by IFA with Reflex to Titer In process     8/22/2022  3:48 PM Anti Nuclear Keli IgG by IFA with Reflex In process     8/21/2022  9:34 PM Acid-Fast Bacilli Culture and Stain In process     8/21/2022  9:34 PM Coccidioides antibody In process       These results will be followed up by inpatient team.    Discharge Disposition   Transferred to [facility]  Condition at discharge: Stable    Hospital Course   SUMMARY:  57 year old male admitted for:   Chest pain/pressure.   Pts past medical history  includes recent covid infection (+COVID-19 test on 8/5) secondhand tobacco exopsure, HTN.  On admission he was diagnosed with a 1.6 cm cavitary lung lesion and a CT Angio that suggests significant occlusion of proximal circumflex.     Problem list:     1. Pneumonia with 1.6 cm cavitary lung lesion: On IV levaquin per ID.  Will start second IV to accomplish this.  Can convert to PO levaquin post procedure.              Pulmonary input appreciated; sputums sent but inadequate specimines for AFB.  Patient has very low risk for TB and no symptoms so we are proceeding with cardiac cath.   Discussed with pulmonologist today.  Post procedure he will need a 14 day course of levaquin and follow up CT scan as an outpatient to assure resolution.  Likely does not need bronch at this point.    2.  CAD/NSTEMI: cardiology input appreciated; pending transfer for cardiac cath tomorrow for intervention on circumflex lesion.   - on heparin drip              - lovenox held   - Airborn precautions for procedure due to not completely COVID -19 recovered and very small chance of TB but no AFB result to rule it out definitively.  3.  Essential HTN:  Continue losartan  4.  COVID-19: positive on 8/5.  4.  dvt  Prevention: lovenox to be resumed post angio.      Consultations This Hospital Stay   PHARMACY TO DOSE VANCO  PHARMACY TO DOSE VANCO  PULMONARY IP CONSULT  INFECTIOUS DISEASES IP CONSULT  INFECTION PREVENTION IP CONSULT  CARDIOLOGY IP CONSULT  PHARMACY IP CONSULT  PHARMACY IP CONSULT  PHARMACY IP CONSULT    Code Status   Full Code    Time Spent on this Encounter   I, Khanh Silva MD, personally saw the patient today and spent greater than 30 minutes discharging this patient.       Khanh Silva MD  [facility]  [address] [place] [id number]  Phone: [phone]  Fax: [phone]  ______________________________________________________________________    Physical Exam    Vital Signs: Temp: 98.3  F (36.8  C) Temp src: Oral BP: (!) 146/90 Pulse: 74   Resp: 16 SpO2: 96 % O2 Device: None (Room air)    Weight: 223 lbs 14.4 oz  Constitutional: awake and alert  Eyes: pupils equal, round and reactive to light and sclera clear  ENT: normocepalic, without obvious abnormality  Respiratory: no increased work of breathing, good air exchange and rhonchi right apex and right middle lobe  Cardiovascular: normal S1 and S2  GI: normal bowel sounds  Skin: no bruising or bleeding and no rashes  Musculoskeletal: there is no redness, warmth, or swelling of the joints  tone is normal  Neurologic: Awake, alert, oriented to name, place and time.  Cranial nerves II-XII are grossly intact.  Motor is 5 out of 5 bilaterally.  Moves all extremitits.       Primary Care Physician   Provider Not In System    Discharge Orders      General info for SNF    Length of Stay Estimate: Short Term Care: Estimated # of Days <30  Condition at Discharge: Improving  Level of care:skilled   Rehabilitation Potential: Excellent  Admission H&P remains valid and up-to-date: Yes  Recent Chemotherapy: N/A  Use Nursing Home Standing Orders: Yes     Reason for your hospital stay    Chest Pain due to proximal circumflex occlusion  Cavitatary Lung lesion  COVID-19 postitive on 8/5     Activity - Up with nursing assistance     Airborne Isolation     Fall precautions     Diet    Follow this diet upon discharge: Orders Placed This Encounter      Regular Diet Adult       Significant Results and Procedures   Results for orders placed or performed during the hospital encounter of 08/21/22   CT Chest Pulmonary Embolism w Contrast    Narrative    EXAM: CT CHEST PULMONARY EMBOLISM W CONTRAST  LOCATION: [facility]  DATE/TIME: 8/21/2022 6:24 PM    INDICATION: Dyspnea on exertion. Chest pain.  COMPARISON: None.  TECHNIQUE: CT chest pulmonary angiogram during arterial phase injection of IV contrast. Multiplanar reformats  and MIP reconstructions were performed. Dose reduction techniques were used.   CONTRAST: 100ml Isovue 370     FINDINGS:  ANGIOGRAM CHEST: Pulmonary arteries are normal caliber and negative for pulmonary emboli. Thoracic aorta is negative for dissection. No CT evidence of right heart strain.    LUNGS AND PLEURA: 1.6 cm cavitary lesion within the periphery of the right upper lobe with multiple small perilesional nodules. Intermediate uniform wall with some fluid in the cavity. Findings most likely inflammatory. Differential diagnosis would   include fungal diseases and such entities as tuberculosis.    MEDIASTINUM/AXILLAE: Normal.    CORONARY ARTERY CALCIFICATION: Mild.    UPPER ABDOMEN: Normal.    MUSCULOSKELETAL: Normal.      Impression    IMPRESSION:  1.  No pulmonary embolism.    2.  1.6 cm cavitary lesion within the periphery of the right upper lobe with multiple small perilesional nodules. Intermediate uniform wall thickness with some fluid within the cavity. This most likely is infectious/inflammatory and fungal diseases as   well as tuberculosis would be included in the differential diagnosis.    NOTE: ABNORMAL REPORT    1.  THE DICTATION ABOVE DESCRIBES AN ABNORMALITY FOR WHICH FOLLOW-UP IS NEEDED.    Radiologist Consult For Cardiology    Narrative    OVERREAD: DETAILED [place] [facility] EXTRACARDIAC OVERREAD OF CARDIAC CT   LOCATION: [facility]  DATE/TIME: 8/22/2022 2:27 PM    INDICATION: Chest pain.  TECHNIQUE: Dose reduction techniques were used.  COMPARISON: Chest CTA 08/21/2022.    FINDINGS:    LIMITED CHEST: Multiple, small clustered right upper lobe nodules are again partially seen including the largest one measuring 1.4 cm which is cavitary. No new findings.  LIMITED MEDIASTINUM: Negative.  LIMITED UPPER ABDOMEN: Negative.      Impression    IMPRESSION:    1.  Small cluster of right upper lobe nodules are again noted including the largest which is cavitary and measuring 1.4  cm.  2.  No new findings.  3.  Please refer to cardiologist's dictation for the cardiac CT report.   Echocardiogram Complete     Value    LVEF  60-65%    Narrative    [id number]  [id number]  [id number]  [id number]^JERMAN^[place] [address]     Name: ZINA CORREA  MRN: [id number]  : 1965  Study Date: 2022 09:06 AM  Age: 57 yrs  Gender: Male  Patient Location: [facility]  Reason For Study: Chest Pressure  Ordering Physician: CAMILA STOLL  Performed By: CLARISA     BSA: 2.2 m2  Height: 71 in  Weight: 224 lb  ______________________________________________________________________________  Procedure  Complete Portable Echo Adult. Adequate quality two-dimensional was performed  and interpreted. Adequate quality color and spectral Doppler were performed  and interpreted. There is no comparison study available.  ______________________________________________________________________________  Interpretation Summary     The left ventricle is normal in size with mild concentric left ventricular  hypertrophy.  Left ventricular function is normal.The ejection fraction is 60-65%. No  regional wall motion abnormalities noted.  Normal right ventricle size and systolic function.  No valve disease identified.  There is no comparison study available.  ______________________________________________________________________________  I      WMSI = 1.00     % Normal = 100     X - Cannot   0 -                      (2) - Mildly 2 -          Segments  Size  Interpret    Hyperkinetic 1 - Normal  Hypokinetic  Hypokinetic  1-2     small                                                     7 -          3-5      moderate  3 - Akinetic 4 -          5 -         6 - Akinetic Dyskinetic   6-14    large               Dyskinetic   Aneurysmal  w/scar       w/scar       15-16   diffuse     Left Ventricle  The left ventricle is normal in size. Left ventricular function is normal.The  ejection fraction is  60-65%. There is mild concentric left ventricular  hypertrophy. Left ventricular diastolic function is normal. No regional wall  motion abnormalities noted.     Right Ventricle  Normal right ventricle size and systolic function.     Atria  Normal left atrial size. Right atrial size is normal. There is no color  Doppler evidence of an atrial shunt.     Mitral Valve  Mitral valve leaflets appear normal. There is no evidence of mitral stenosis  or clinically significant mitral regurgitation.     Tricuspid Valve  Tricuspid valve leaflets appear normal. There is no evidence of tricuspid  stenosis or clinically significant tricuspid regurgitation. Right ventricular  systolic pressure could not be approximated due to inadequate tricuspid  regurgitation.     Aortic Valve  The aortic valve is trileaflet. Aortic valve leaflets appear normal. There is  no evidence of aortic stenosis or clinically significant aortic regurgitation.     Pulmonic Valve  The pulmonic valve is not well seen, but is grossly normal. This degree of  valvular regurgitation is within normal limits. There is trace pulmonic  valvular regurgitation.     Vessels  The aorta root is normal. Normal size ascending aorta. IVC diameter <2.1 cm  collapsing >50% with sniff suggests a normal RA pressure of 3 mmHg.     Pericardium  There is no pericardial effusion.     Rhythm  Sinus rhythm was noted.  ______________________________________________________________________________  MMode/2D Measurements & Calculations  IVSd: 1.0 cm     LVIDd: 4.7 cm  LVIDs: 3.1 cm  LVPWd: 1.1 cm  FS: 33.5 %  LV mass(C)d: 176.9 grams  LV mass(C)dI: 79.9 grams/m2  Ao root diam: 2.7 cm  LA dimension: 2.4 cm  asc Aorta Diam: 2.7 cm  LA/Ao: 0.89  LVOT diam: 2.1 cm  LVOT area: 3.4 cm2  LA Volume Indexed (AL/bp): 12.7 ml/m2  RWT: 0.46     Time Measurements  MM HR: 62.0 BPM     Doppler Measurements & Calculations  MV E max amos: 46.6 cm/sec  MV A max amos: 65.0 cm/sec  MV E/A: 0.72  MV dec time:  0.32 sec  LV V1 max PG: 3.0 mmHg  LV V1 max: 87.3 cm/sec  LV V1 VTI: 20.0 cm  SV(LVOT): 67.4 ml  SI(LVOT): 30.4 ml/m2  PA acc time: 0.13 sec  E/E' av.4  Lateral E/e': 6.8  Medial E/e': 7.9     ______________________________________________________________________________  Report approved by: Roxie Ruiz 2022 11:55 AM         CTA  ANGIOGRAM CORONARY ARTERY     Value    BSA 1.00    Narrative      At least mild, possibly severe, coronary atherosclerosis    Ost Cx to Prox Cx lesion has possibly severe luminal stenosis in the   range of 70-99%. Non-calcified plaque has been detected. Hazy segment,   probably artifactual. Cannot exclude thrombotic subtotal occlusion    CT FFR of proximal circumflex lesion severity is less than 0.5,   significant and suspicious for total occlusion.      CT Fractional Flow Reserve    Narrative      At least mild, possibly severe, coronary atherosclerosis    Ost Cx to Prox Cx lesion has possibly severe luminal stenosis in the   range of 70-99%. Non-calcified plaque has been detected. Hazy segment,   probably artifactual. Cannot exclude thrombotic subtotal occlusion    CT FFR of proximal circumflex lesion severity is less than 0.5,   significant and suspicious for total occlusion.            Discharge Medications   Current Discharge Medication List      CONTINUE these medications which have NOT CHANGED    Details   fish oil-omega-3 fatty acids 1000 MG capsule Take 1 g by mouth 2 times daily (with meals) Take after lunch and dinner.      ibuprofen (ADVIL/MOTRIN) 200 MG tablet Take 400 mg by mouth At Bedtime      losartan (COZAAR) 25 MG tablet Take 25 mg by mouth every morning      Misc Natural Products (OSTEO BI-FLEX JOINT SHIELD) TABS Take 1 tablet by mouth 2 times daily (with meals) Take after lunch and dinner.      vitamin D3 (CHOLECALCIFEROL) 50 mcg (2000 units) tablet Take 50 mcg by mouth every morning           Allergies   Allergies   Allergen Reactions     Cephalexin  Hives and Rash

## 2022-08-25 ENCOUNTER — APPOINTMENT (OUTPATIENT)
Dept: OCCUPATIONAL THERAPY | Facility: HOSPITAL | Age: 57
End: 2022-08-25
Attending: INTERNAL MEDICINE
Payer: COMMERCIAL

## 2022-08-25 VITALS
OXYGEN SATURATION: 99 % | TEMPERATURE: 98 F | BODY MASS INDEX: 31.1 KG/M2 | SYSTOLIC BLOOD PRESSURE: 139 MMHG | HEART RATE: 76 BPM | DIASTOLIC BLOOD PRESSURE: 94 MMHG | WEIGHT: 223 LBS | RESPIRATION RATE: 20 BRPM

## 2022-08-25 LAB
1,3 BETA GLUCAN SER-MCNC: <31 PG/ML
ANION GAP SERPL CALCULATED.3IONS-SCNC: 7 MMOL/L (ref 5–18)
ATRIAL RATE - MUSE: 72 BPM
BUN SERPL-MCNC: 13 MG/DL (ref 8–22)
CALCIUM SERPL-MCNC: 8.9 MG/DL (ref 8.5–10.5)
CHLORIDE BLD-SCNC: 107 MMOL/L (ref 98–107)
CO2 SERPL-SCNC: 23 MMOL/L (ref 22–31)
COCCIDIOIDES AB TITR SER CF: NORMAL {TITER}
CREAT SERPL-MCNC: 0.99 MG/DL (ref 0.7–1.3)
DIASTOLIC BLOOD PRESSURE - MUSE: NORMAL MMHG
GFR SERPL CREATININE-BSD FRML MDRD: 89 ML/MIN/1.73M2
GLUCOSE BLD-MCNC: 97 MG/DL (ref 70–125)
HGB BLD-MCNC: 14.8 G/DL (ref 13.3–17.7)
INTERPRETATION ECG - MUSE: NORMAL
OBSERVATION IMP: NEGATIVE
P AXIS - MUSE: 13 DEGREES
PLATELET # BLD AUTO: 209 10E3/UL (ref 150–450)
POTASSIUM BLD-SCNC: 3.7 MMOL/L (ref 3.5–5)
PR INTERVAL - MUSE: 178 MS
QRS DURATION - MUSE: 88 MS
QT - MUSE: 388 MS
QTC - MUSE: 424 MS
R AXIS - MUSE: -14 DEGREES
SCANNED LAB RESULT: NORMAL
SODIUM SERPL-SCNC: 137 MMOL/L (ref 136–145)
SYSTOLIC BLOOD PRESSURE - MUSE: NORMAL MMHG
T AXIS - MUSE: 0 DEGREES
VENTRICULAR RATE- MUSE: 72 BPM

## 2022-08-25 PROCEDURE — 85018 HEMOGLOBIN: CPT | Performed by: INTERNAL MEDICINE

## 2022-08-25 PROCEDURE — 93005 ELECTROCARDIOGRAM TRACING: CPT | Performed by: INTERNAL MEDICINE

## 2022-08-25 PROCEDURE — 36415 COLL VENOUS BLD VENIPUNCTURE: CPT | Performed by: INTERNAL MEDICINE

## 2022-08-25 PROCEDURE — 97110 THERAPEUTIC EXERCISES: CPT | Mod: GO

## 2022-08-25 PROCEDURE — 82310 ASSAY OF CALCIUM: CPT | Performed by: INTERNAL MEDICINE

## 2022-08-25 PROCEDURE — 97165 OT EVAL LOW COMPLEX 30 MIN: CPT | Mod: GO

## 2022-08-25 PROCEDURE — 250N000013 HC RX MED GY IP 250 OP 250 PS 637: Performed by: INTERNAL MEDICINE

## 2022-08-25 PROCEDURE — 250N000013 HC RX MED GY IP 250 OP 250 PS 637: Performed by: FAMILY MEDICINE

## 2022-08-25 PROCEDURE — 97535 SELF CARE MNGMENT TRAINING: CPT | Mod: GO

## 2022-08-25 PROCEDURE — 99232 SBSQ HOSP IP/OBS MODERATE 35: CPT | Performed by: HOSPITALIST

## 2022-08-25 PROCEDURE — 85049 AUTOMATED PLATELET COUNT: CPT | Performed by: INTERNAL MEDICINE

## 2022-08-25 RX ORDER — ASPIRIN 81 MG/1
81 TABLET, CHEWABLE ORAL DAILY
Qty: 30 TABLET | Refills: 3 | Status: SHIPPED | OUTPATIENT
Start: 2022-08-25

## 2022-08-25 RX ORDER — LEVOFLOXACIN 750 MG/1
750 TABLET, FILM COATED ORAL EVERY 24 HOURS
Qty: 14 TABLET | Refills: 0 | Status: SHIPPED | OUTPATIENT
Start: 2022-08-25 | End: 2022-09-09

## 2022-08-25 RX ORDER — NITROGLYCERIN 0.4 MG/1
TABLET SUBLINGUAL
Qty: 25 TABLET | Refills: 3 | Status: SHIPPED | OUTPATIENT
Start: 2022-08-25 | End: 2022-10-27

## 2022-08-25 RX ORDER — ROSUVASTATIN CALCIUM 40 MG/1
40 TABLET, COATED ORAL DAILY
Qty: 90 TABLET | Refills: 3 | Status: SHIPPED | OUTPATIENT
Start: 2022-08-25 | End: 2022-10-27

## 2022-08-25 RX ADMIN — TICAGRELOR 90 MG: 90 TABLET ORAL at 07:55

## 2022-08-25 RX ADMIN — Medication 81 MG: at 07:55

## 2022-08-25 RX ADMIN — LOSARTAN POTASSIUM 50 MG: 50 TABLET, FILM COATED ORAL at 07:55

## 2022-08-25 RX ADMIN — METOPROLOL TARTRATE 25 MG: 25 TABLET, FILM COATED ORAL at 07:59

## 2022-08-25 ASSESSMENT — ACTIVITIES OF DAILY LIVING (ADL)
ADLS_ACUITY_SCORE: 20

## 2022-08-25 NOTE — PROGRESS NOTES
[facility]rief [facility] Internal Medicine Progress Note       ASSESSMENT:    Principal Problem:    NSTEMI (non-ST elevated myocardial infarction) (H)  Active Problems:    Essential Hypertension    Lung abscess (H)    Percutaneous transluminal coronary angioplasty status      PLAN:    Historical data reviewed. Please see admission H/P and discharge summary from earlier today for additional information.     57-year-old male with with history of hypertension and prior COVID-19 infection who presents with chest pain.  He is found to have NSTEMI and cavitary lung lesion.      NSTEMI: Patient transferred to [facility] for coronary angiogram.  Coronary angiogram performed 8/24/2022 shows proximal and moderate mid vessel LAD disease with severe mid circumflex disease treated with JAZMIN.  -- Continue aspirin, Brilinta, statin, metoprolol, losartan      Cavitary lung lesion: CTA done on admission shows several cavitary lesions right upper lobe.  ID and pulmonary consulted.  Etiology of lung lesion remains unclear.  Patient was unable to produce sputum for AFB analysis despite induction.   -- ID and pulmonary medicine recommend 2-week course of Levaquin with outpatient ID follow-up and recheck of CT of the chest as outpatient.  Pulmonary does not feel that bronchoscopy is needed currently  -- There is currently less suspicion for TB.  Respiratory aerobic bacterial culture is negative for obvious infection and shows mixed comfort.    -- Fungal work-up still pending      History of hypertension: Continue home losartan      COVID recovered: Patient tested + 8/5/2022      DVT PPX: SCD, ambulate        Nic Tinsley D.O.  [phone]

## 2022-08-25 NOTE — PLAN OF CARE
Problem: Plan of Care - These are the overarching goals to be used throughout the patient stay.    Goal: Optimal Comfort and Wellbeing  Outcome: Ongoing, Progressing     Problem: Plan of Care - These are the overarching goals to be used throughout the patient stay.    Goal: Readiness for Transition of Care  Outcome: Ongoing, Progressing  Intervention: Mutually Develop Transition Plan  Recent Flowsheet Documentation  Taken 8/24/2022 1700 by Marissa Will, RN  Equipment Currently Used at Home: none   Goal Outcome Evaluation:    Plan of Care Reviewed With: patient     Overall Patient Progress: improving    Pt arrived from angio, TR band removed at 1930. Pt is axox4, on RA, and independent in the room. Pt's wife was at bedside until around 2030. PRN hydralazine given for high BP. BP is currently 170's/90's. Pt said they feel SOB a little after PRN was given.   Plan is for pt to go home tomorrow per pt. Continue to monitor.

## 2022-08-25 NOTE — DISCHARGE INSTRUCTIONS

## 2022-08-25 NOTE — PROGRESS NOTES
Patient discharged to home.  Discharge instructions provided to patient and patient's wife.  Patient will  discharge medications from home pharmacy.  Patient belongings sent with patient.  PIV removed prior to discharge.  Patient ambulated independently to lobby with wife who is providing transportation.

## 2022-08-25 NOTE — PLAN OF CARE
Problem: Bleeding (Cardiac Catheterization)  Goal: Absence of Bleeding  Outcome: Met     Problem: Pain (Cardiac Catheterization)  Goal: Acceptable Pain Control  Outcome: Ongoing, Progressing     Problem: Vascular Access Protection (Cardiac Catheterization)  Goal: Absence of Vascular Access Complication  Outcome: Met   Goal Outcome Evaluation:      Please see previous note.

## 2022-08-25 NOTE — PROGRESS NOTES
Cardiac Rehab     08/25/22 0730   Quick Adds   Type of Visit Initial Occupational Therapy Evaluation   Living Environment   People in Home spouse   Current Living Arrangements house   Home Accessibility stairs to enter home;stairs within home   Number of Stairs, Main Entrance 2   Stair Railings, Main Entrance railings safe and in good condition   Number of Stairs, Within Home, Primary greater than 10 stairs   Stair Railings, Within Home, Primary railings safe and in good condition   Living Environment Comments Patient independent at baseline, works full time for [employer].   Self-Care   Usual Activity Tolerance good   Regular Exercise Yes   Activity/Exercise Type walking   Exercise Amount/Frequency 30 mins;3-5 times/wk   Equipment Currently Used at Home none   Fall history within last six months no   General Information   Onset of Illness/Injury or Date of Surgery 08/24/22   Referring Physician Alize Ortega MD   Additional Occupational Profile Info/Pertinent History of Current Problem 57-year-old male with with history of hypertension and prior COVID-19 infection who presents with chest pain.  He is found to have NSTEMI and cavitary lung lesion. Subsequently, had 1 JAZMIN placed.   Existing Precautions/Restrictions cardiac   Cognitive Status Examination   Orientation Status orientation to person, place and time   Range of Motion Comprehensive   General Range of Motion no range of motion deficits identified   Strength Comprehensive (MMT)   General Manual Muscle Testing (MMT) Assessment no strength deficits identified   Bed Mobility   Bed Mobility supine-sit;sit-supine   Supine-Sit [place] (Bed Mobility) independent   Sit-Supine [place] (Bed Mobility) independent   Transfers   Transfers sit-stand transfer   Sit-Stand Transfer   Sit-Stand [place] (Transfers) independent   Sit/Stand Transfer Comments Independent with LE dressing   Clinical Impression   Criteria for Skilled Therapeutic Interventions Met  (OT) Yes, treatment indicated   OT Diagnosis Decreased endurance for ADLs   Influenced by the following impairments NSTEMI, stent   OT Problem List-Impairments impacting ADL problems related to;activity tolerance impaired   Assessment of Occupational Performance 1-3 Performance Deficits   Identified Performance Deficits endurance for ADLs   Planned Therapy Interventions (OT) ADL retraining;home program guidelines;progressive activity/exercise;risk factor education   Clinical Decision Making Complexity (OT) low complexity   Risk & Benefits of therapy have been explained evaluation/treatment results reviewed   OT Discharge Planning   OT Discharge Recommendation (DC Rec) home with outpatient cardiac rehab   Total Evaluation Time (Minutes)   Total Evaluation Time (Minutes) 14   OT Goals   Therapy Frequency (OT) One time eval and treatment   OT Predicted Duration/Target Date for Goal Attainment 08/26/22   OT Goals Cardiac Phase 1   OT: Understanding of cardiac education to maximize quality of life, condition management, and health outcomes Patient;Verbalize   OT: Perform aerobic activity with stable cardiovascular response continuous;5 minutes;ambulation   OT: Functional/aerobic ambulation tolerance with stable cardiovascular response in order to return to home and community environment Independent;200 feet   OT: Navigation of stairs simulating home set up with stable cardiovascular response in order to return to home and community environment Greater than 10 stairs;Independent

## 2022-08-25 NOTE — DISCHARGE SUMMARY
[facility]  Hospitalist Discharge Summary      Date of Admission:  8/24/2022  Date of Discharge:  8/25/2022 10:53 AM  Discharging Provider: Dhara Antonio MD  Discharge Service: Hospitalist Service    Discharge Diagnoses   Principal Problem:    NSTEMI (non-ST elevated myocardial infarction) (H)  Active Problems:    Essential Hypertension    Lung abscess (H)    Percutaneous transluminal coronary angioplasty status      Follow-ups Needed After Discharge   Follow-up Appointments     Follow-up and recommended labs and tests       Follow up with primary care provider, Provider Not In System, within 7   days for hospital follow- up.  No follow up labs or test are needed.             Unresulted Labs Ordered in the Past 30 Days of this Admission     Date and Time Order Name Status Description    8/23/2022 10:43 AM Acid-Fast Bacilli Culture and Stain In process     8/22/2022  3:48 PM Blastomyces antibody ID In process     8/22/2022  3:48 PM Fungal Antibody by Immunodiffusion In process     8/22/2022  3:48 PM 1,3 Beta D glucan fungitell In process     8/21/2022  9:34 PM Acid-Fast Bacilli Culture and Stain In process     8/21/2022  9:34 PM Coccidioides antibody In process       These results will be followed up by ID    Discharge Disposition   Discharged to home  Condition at discharge: Stable    Hospital Course   Patient is admitted to the hospital with chest pressure and found to have a cavitary lesion in the right lung.  Initially his troponins were negative.  He was started on Levaquin, vancomycin, ID consult for this cavitary lesion.  Later on his troponins trended up along with his chest pain.  His EKG and echocardiogram were normal.  Cardiology was consulted and they did CT angiogram and they recommended angioplasty so patient was transferred from [facility] to [facility].  Patient was taken to Cath Lab and had a stent placed in circumflex artery.  Patient was started on Brilinta,  aspirin, Lipitor.  Per infectious disease - the cavitary lesion -the chance of TB was very low, ordered fungal work up and they recommended Levaquin for 2 weeks.  They recommend outpatient follow-up in 2 weeks with a CT chest.  Patient is stable to be discharged home with cardiac meds.  I ordered follow-up with ID and CT chest.    Consultations This Hospital Stay   PHARMACY IP CONSULT  NUTRITION SERVICES ADULT IP CONSULT  CARDIAC REHAB IP CONSULT  PHARMACY IP CONSULT  HOSPITALIST IP CONSULT  SMOKING CESSATION PROGRAM IP CONSULT    Code Status   Full Code    Time Spent on this Encounter   I, Dhara Antonio MD, personally saw the patient today and spent greater than 30 minutes discharging this patient.       Dhara Antonio MD  [facility] HEART [address] [address]  Phone: [phone]  Fax: [phone]  ______________________________________________________________________    Physical Exam   Vital Signs: Temp: 98  F (36.7  C) Temp src: Oral BP: (!) 139/94 Pulse: 76   Resp: 20 SpO2: 99 % O2 Device: None (Room air) Oxygen Delivery: 2 LPM  Weight: 223 lbs 0 oz  Constitutional: awake, alert, cooperative, no apparent distress, and appears stated age  Eyes: Lids and lashes normal, pupils equal, round and reactive to light, extra ocular muscles intact, sclera clear, conjunctiva normal  Respiratory: clear to auscultation bilaterally  Cardiovascular:No increased work of breathing, good air exchange, clear to auscultation bilaterally, no crackles or [id number]  GI: No scars, normal bowel sounds, soft, non-distended, non-tender, no masses palpated, no hepatosplenomegally  Neurologic: Awake, alert, oriented to name, place and time.  Cranial nerves II-XII are grossly intact.  Motor is 5 out of 5 bilaterally.  Cerebellar finger to nose, heel to shin intact.  Sensory is intact.  Babinski down going, Romberg negative, and gait is normal.       Primary Care Physician    Provider Not In System    Discharge Orders      CT Chest w/o Contrast     Ambulatory CARDIAC REHAB REFERRAL      Infectious Disease Referral      Reason for your hospital stay    NSTEMI, Cavitary lesion in Lung     Activity    Your activity upon discharge: restrictions per cardiology- no lifting more than 10 pounds. No strenuous physical activity     Follow-up and recommended labs and tests     Follow up with primary care provider, Provider Not In System, within 7 days for hospital follow- up.  No follow up labs or test are needed.     Diet    Follow this diet upon discharge: Orders Placed This Encounter      Low Saturated Fat Na <2400 mg       Significant Results and Procedures   Most Recent 3 CBC's:Recent Labs   Lab Test 08/25/22  0440 08/23/22  1703 08/22/22  0436 08/21/22  1613   WBC  --  7.6 7.7 6.6   HGB 14.8 15.5 15.4 16.0   MCV  --  86 85 86    215 204 234     Most Recent 3 BMP's:Recent Labs   Lab Test 08/25/22  0440 08/24/22  0511 08/22/22  1408 08/22/22  0436 08/21/22  1613     --   --  136 139   POTASSIUM 3.7  --   --  3.9 3.9   CHLORIDE 107  --   --  107 106   CO2 23  --   --  26 28   BUN 13  --   --  13 14   CR 0.99 0.94 1.2 0.98 1.12   ANIONGAP 7  --   --  3* 5   LEAH 8.9  --   --  8.9 9.5   GLC 97  --   --  100 98   ,   Results for orders placed or performed during the hospital encounter of 08/24/22   Cardiac Catheterization    Narrative      NSTEMI in a many with hypertension and hyperlipidemia.    Widely patent left main    The LAD has mild proximal and moderate mid vessel disease.    The circumflex is a large artery that feeds a large OM-3 system. There   is severe mid circumflex disease extending into the OM-3. The lesion was   covered with a Synergy JAZMIN. The small circumflex continuation had ELI-2   flow due to plaque shift, this resolved with balloon into the circumflex   continuation through the stent struts.    The RCA has serial areas of ectasia in the proximal and mid vessel.  "  There are moderate stenoses between the areas of ectasia.    LV EDP normal. LV-Ao pressure gradient 13 mmhg.            Discharge Medications   Current Discharge Medication List      START taking these medications    Details   aspirin (ASA) 81 MG chewable tablet Take 1 tablet (81 mg) by mouth daily Starting tomorrow.  Qty: 30 tablet, Refills: 3    Associated Diagnoses: NSTEMI (non-ST elevated myocardial infarction) (H)      levofloxacin (LEVAQUIN) 750 MG tablet Take 1 tablet (750 mg) by mouth every 24 hours for 14 days  Qty: 14 tablet, Refills: 0    Associated Diagnoses: Abscess of upper lobe of right lung without pneumonia (H)      nitroGLYcerin (NITROSTAT) 0.4 MG sublingual tablet One tablet under the tongue every 5 minutes if needed for chest pain. May repeat every 5 minutes for a maximum of 3 doses in 15 minutes\"  Qty: 25 tablet, Refills: 3    Associated Diagnoses: NSTEMI (non-ST elevated myocardial infarction) (H)      rosuvastatin (CRESTOR) 40 MG tablet Take 1 tablet (40 mg) by mouth daily  Qty: 90 tablet, Refills: 3    Associated Diagnoses: NSTEMI (non-ST elevated myocardial infarction) (H)      ticagrelor (BRILINTA) 90 MG tablet Take 1 tablet (90 mg) by mouth 2 times daily Dose to start tomorrow morning.  Qty: 180 tablet, Refills: 3    Associated Diagnoses: NSTEMI (non-ST elevated myocardial infarction) (H)         CONTINUE these medications which have NOT CHANGED    Details   fish oil-omega-3 fatty acids 1000 MG capsule Take 1 g by mouth 2 times daily (with meals) Take after lunch and dinner.      losartan (COZAAR) 25 MG tablet Take 25 mg by mouth every morning      Misc Natural Products (OSTEO BI-FLEX JOINT SHIELD) TABS Take 1 tablet by mouth 2 times daily (with meals) Take after lunch and dinner.      vitamin D3 (CHOLECALCIFEROL) 50 mcg (2000 units) tablet Take 50 mcg by mouth every morning         STOP taking these medications       ibuprofen (ADVIL/MOTRIN) 200 MG tablet Comments:   Reason for " Stopping:             Allergies   Allergies   Allergen Reactions     Cephalexin Hives and Rash

## 2022-08-25 NOTE — PLAN OF CARE
Occupational Therapy Discharge Summary    Reason for therapy discharge:    Discharged to home with outpatient therapy.    Progress towards therapy goal(s). See goals on Care Plan in [facility] electronic health record for goal details.  Goals met    Therapy recommendation(s):    Continue home exercise program.  Recommend outpatient cardiac rehab

## 2022-08-25 NOTE — PLAN OF CARE
Problem: Plan of Care - These are the overarching goals to be used throughout the patient stay.    Goal: Plan of Care Review/Shift Note  Description: The Plan of Care Review/Shift note should be completed every shift.  The Outcome Evaluation is a brief statement about your assessment that the patient is improving, declining, or no change.  This information will be displayed automatically on your shift note.  Outcome: Ongoing, Progressing     Problem: Plan of Care - These are the overarching goals to be used throughout the patient stay.    Goal: Absence of Hospital-Acquired Illness or Injury  Intervention: Prevent Infection  Recent Flowsheet Documentation  Taken 8/24/2022 2354 by Rosalina Trejo, RN  Infection Prevention: rest/sleep promoted   Goal Outcome Evaluation:      Patient denies pain at this time. Rhythm=NSR, lungs clear with 02 sat's 97% on room air. Bp has been high 165/92 at 2320 and 154/83 at 0011. Post angiogram right radial access, no hematoma,bleeding or bruising noted. Will cont to monitor.

## 2022-08-26 ENCOUNTER — TELEPHONE (OUTPATIENT)
Dept: INFECTIOUS DISEASES | Facility: CLINIC | Age: 57
End: 2022-08-26

## 2022-08-26 NOTE — TELEPHONE ENCOUNTER
Pt seen Dr East in hospital- pt to f/u in 2 weeks with ID, please call to schedule pt, pt also needs CT in 2 weeks prior to ID appt

## 2022-08-26 NOTE — TELEPHONE ENCOUNTER
Please review referral and advise on scheduling.     Authorizing: Dhara Antonio MD     Referral: [id number] (Pending Review)       Expires: 8/25/2023 Priority: Priority: 1-2 Weeks   Diagnosis: Abscess of upper lobe of right lung without pneumonia (H) [J85.2]

## 2022-08-29 ENCOUNTER — TELEPHONE (OUTPATIENT)
Dept: FAMILY MEDICINE | Facility: CLINIC | Age: 57
End: 2022-08-29

## 2022-08-29 NOTE — TELEPHONE ENCOUNTER
Reason for Call:  Other Post op visit    Detailed comments: Patients PCC was [facility] about 7 yrs ago but moved away.  Patient is now back in [place] and had a stent placed in one of the patients arteries. Patient went to [facility] ED and was transferred directly to [facility] on 8.21.22 where he stayed until 8.25.22   Stent was placed at [facility] on 8.24.22.     Patient needs a Hosp f/u this week (8.29.22) but there are no available openings per scheduling.Please call patient to fit into schedule sometime this week with a provider for hosp f/u.    Phone Number Patient can be reached at: Home number on file [phone] (home)    Best Time: anytime    Can we leave a detailed message on this number? YES    Call taken on 8/29/2022 at 9:48 AM by IRMA REYES

## 2022-08-29 NOTE — TELEPHONE ENCOUNTER
Outgoing Call:  No answer  Please see note below    Lisa Manzo RN contacted Jose Raul on 08/29/22 and left a message. If patient calls back please route to any nurse/ma and schedule pt with Hernandez Whelan for Hospital Follow-up    Lisa CLEVELAND RN  [facility]

## 2022-08-30 ENCOUNTER — OFFICE VISIT (OUTPATIENT)
Dept: INTERNAL MEDICINE | Facility: CLINIC | Age: 57
End: 2022-08-30
Payer: COMMERCIAL

## 2022-08-30 VITALS
OXYGEN SATURATION: 97 % | HEART RATE: 90 BPM | DIASTOLIC BLOOD PRESSURE: 78 MMHG | HEIGHT: 71 IN | BODY MASS INDEX: 30.66 KG/M2 | SYSTOLIC BLOOD PRESSURE: 120 MMHG | WEIGHT: 219 LBS

## 2022-08-30 DIAGNOSIS — J18.9 CAVITARY PNEUMONIA: ICD-10-CM

## 2022-08-30 DIAGNOSIS — I25.2 HISTORY OF NON-ST ELEVATION MYOCARDIAL INFARCTION (NSTEMI): ICD-10-CM

## 2022-08-30 DIAGNOSIS — Z86.16 HISTORY OF 2019 NOVEL CORONAVIRUS DISEASE (COVID-19): ICD-10-CM

## 2022-08-30 DIAGNOSIS — J98.4 CAVITARY PNEUMONIA: ICD-10-CM

## 2022-08-30 DIAGNOSIS — I25.84 CORONARY ARTERY DISEASE DUE TO CALCIFIED CORONARY LESION: Primary | ICD-10-CM

## 2022-08-30 DIAGNOSIS — I25.10 CORONARY ARTERY DISEASE DUE TO CALCIFIED CORONARY LESION: Primary | ICD-10-CM

## 2022-08-30 DIAGNOSIS — I10 ESSENTIAL HYPERTENSION: ICD-10-CM

## 2022-08-30 LAB
ASPERGILLUS AB SER QL ID: NOT DETECTED
B DERMAT AB SER QL ID: NOT DETECTED
C IMMITIS AB SER QL ID: NOT DETECTED
H CAPSUL AB SER QL ID: NOT DETECTED

## 2022-08-30 PROCEDURE — 99215 OFFICE O/P EST HI 40 MIN: CPT | Performed by: INTERNAL MEDICINE

## 2022-08-30 NOTE — PROGRESS NOTES
Jose Raul Larsen   57 year old male    Date of Visit: 8/30/2022    Chief Complaint   Patient presents with     Hospital F/U     Persistent cough - chest pressure resolved      Subjective  Jose Raul just moved to [place] from [place].    He was admitted to the hospital from August 24- - August 25 for cardiac catheterization for a non-ST elevation MI.  Mild elevation in troponin I.    He was having chest pressure on day of admission, in the emergency room he stopped having chest pressure and is not having chest pressure since.  With the elevated troponin I they transferred him for a cardiac catheterization which showed severe circumflex stenosis and a drug-eluting stent was placed.  Moderate RCA and moderate LAD disease and patent left main.    He is now on aspirin and Brilinta.  They put him on rosuvastatin 40 mg.    He states that in the past his cholesterol levels have been okay and he has not been on cholesterol medication.    He does have some family history of coronary disease although not strong.    He is never smoked    Hypertension has been controlled previously with losartan 25 mg a day.    August 25 labs reviewed with a creatinine of 0.99.  Blood sugar 97.  Normal hemoglobin.    Just some mild bruising from his right wrist at the catheterization site.    Patient is active with regular walking.  He has not had exertional issues in the past.  Nonobese.    Patient did test positive for COVID on August 5.  He had a mild disease and was asymptomatic when he presented on August 21, still had positive COVID test.    Minimal dry cough now.    He is actually had a cough for a number of months and was seen in [place] and was given some allergy treatment that did improve the cough for a while.    He had a severe pneumonia and was hospitalized in 2020.    In the hospital a chest CT scan showed a cavitary lesion in the right lung.  Treat with Levaquin and vancomycin.  Was not having fevers.  He was discharged on  "Levaquin.  He did have an AFB sputum that was negative on August 23.  MRSA negative.  Negative for Aspergillus blastomycosis histo and cocci on August 22.  HIV test was negative.    Patient denies any exposure to homeless shelters, mission work, or other exposure to somebody who had tuberculosis.  He is been working from home mostly.    Previous appendectomy.  Otherwise he feels he has been healthy.    He is not currently having any diarrhea or rash.    Some mild diffuse myalgias have occurred since starting on the rosuvastatin, does describe them as mild.  No history of liver problems.    PMHx:  No past medical history on file.  PSHx:    Past Surgical History:   Procedure Laterality Date     CV CORONARY ANGIOGRAM N/A 8/24/2022    Procedure: CV CORONARY ANGIOGRAM;  Surgeon: Alize Ortega MD;  Location: [facility]     CV LEFT HEART CATH N/A 8/24/2022    Procedure: Left Heart Catheterization;  Surgeon: Alize Ortega MD;  Location: [facility]     CV PCI STENT DRUG ELUTING N/A 8/24/2022    Procedure: Percutaneous Coronary Intervention Stent;  Surgeon: Alize Ortega MD;  Location: [facility] CV     ZZC APPENDECTOMY      Description: Appendectomy;  Recorded: 02/19/2010;     Immunizations:   Immunization History   Administered Date(s) Administered     DT (PEDS <7y) 07/30/2001     FLU 6-35 months 09/23/2011     Influenza Vaccine, 6+MO IM (QUADRIVALENT W/PRESERVATIVES) 09/28/2012     Td,adult,historic,unspecified 02/19/2010     Tdap (Adacel,Boostrix) 02/19/2010       ROS A comprehensive review of systems was performed and was otherwise negative    Medications, allergies, and problem list were reviewed and updated    Exam  /78 (BP Location: Right arm, Patient Position: Sitting, Cuff Size: Adult Large)   Pulse 90   Ht 1.803 m (5' 11\")   Wt 99.3 kg (219 lb)   SpO2 97%   BMI 30.54 kg/m    Otherwise healthy-appearing male.  Is mildly overweight.  Normal mobility.  Normal mentation.  " Pupils NOSs equal and reactive and no jaundice.  No cervical or supraclavicular adenopathy and no JVD or carotid bruits.  Lungs are clear to auscultation.  There is no crackles or wheezing.  The right upper lung field sounds normal.  There is no decreased breath sounds or wheezing.  No rhonchi.  Heart is regular with no murmur rub or gallop.  Abdomen is nontender, no hepatomegaly.  No pulsatile abdominal mass.  +2 pedal pulses and no ankle edema    Assessment/Plan  1. Coronary artery disease due to calcified coronary lesion  Known coronary disease now after non-ST elevation MI.  Heart attack just after having COVID, may have been associated with COVID.    He will continue on the aspirin and Brilinta with drug-eluting stent placed.  I did discuss bleeding risk with these medications.  Plan Brilinta for 1 year, aspirin long-term.    Now on rosuvastatin 40 mg, appropriate statin but I did warn patient on risk of myalgias and liver toxicity.  If he has worsening muscle achiness he was told to seek medical attention right away.    2. History of non-ST elevation myocardial infarction (NSTEMI)  As above.  Patient was told if recurrent chest pressure to dial 911 and go to the hospital immediately.    3. Essential hypertension  Controlled on losartan 25 mg a day.  Normal kidney labs August 25    5. Cavitary pneumonia  I suspect this is scarring from her previous pneumonia in 2020 when he was very ill with a pneumonia.  Likely an incidental finding with the chest imaging in the hospital.  He does not describe a productive cough.  He does not describe high risk exposure to tuberculosis.  Work-up in the hospital negative so far.    Repeat chest CT scan September 8 and follow-up with infectious disease on September 15.    Finish the current Levaquin prescription, appears to be tolerating that at this time.    6. History of 2019 novel coronavirus disease (COVID-19)  Minimally symptomatic at the beginning of August.  Test was  "still positive on August 21.      Return in about 4 weeks (around 9/27/2022) for Follow up.   Patient Instructions   Finish the current antibiotic as planned.  Follow-up with infectious disease including a follow-up chest CT scan.    If you have any further chest pressure or pain, dial 911 and go to emergency room immediately for evaluation.    Follow-up with cardiology next month as planned.    If you have worsening muscle achiness, that may be from your rosuvastatin, and contact this clinic or cardiology right away to see if you need further evaluation.    Plan to continue on the Brilinta for 1 year.  You will continue on aspirin daily long-term.    If you cannot find an alternative primary care physician to establish care with, see me in 1 month for follow-up appointment.    Mio Gtz MD, MD        Current Outpatient Medications   Medication Sig Dispense Refill     aspirin (ASA) 81 MG chewable tablet Take 1 tablet (81 mg) by mouth daily Starting tomorrow. 30 tablet 3     fish oil-omega-3 fatty acids 1000 MG capsule Take 1 g by mouth 2 times daily (with meals) Take after lunch and dinner.       levofloxacin (LEVAQUIN) 750 MG tablet Take 1 tablet (750 mg) by mouth every 24 hours for 14 days 14 tablet 0     losartan (COZAAR) 25 MG tablet Take 25 mg by mouth every morning       Misc Natural Products (OSTEO BI-FLEX JOINT SHIELD) TABS Take 1 tablet by mouth 2 times daily (with meals) Take after lunch and dinner.       nitroGLYcerin (NITROSTAT) 0.4 MG sublingual tablet One tablet under the tongue every 5 minutes if needed for chest pain. May repeat every 5 minutes for a maximum of 3 doses in 15 minutes\" 25 tablet 3     rosuvastatin (CRESTOR) 40 MG tablet Take 1 tablet (40 mg) by mouth daily 90 tablet 3     ticagrelor (BRILINTA) 90 MG tablet Take 1 tablet (90 mg) by mouth 2 times daily Dose to start tomorrow morning. 180 tablet 3     vitamin D3 (CHOLECALCIFEROL) 50 mcg (2000 units) tablet Take 50 mcg by mouth every " "morning       Allergies   Allergen Reactions     Cephalexin Hives and Rash     Social History     Tobacco Use     Smoking status: Never Smoker     Smokeless tobacco: Never Used   Vaping Use     Vaping Use: Never used             Ed Blunt is a 57 year old accompanied by his Self, presenting for the following health issues:  Hospital F/U (Persistent cough - chest pressure resolved )      HPI       Hospital Follow-up Visit:    Hospital/Nursing Home/IP Rehab Facility: [facility]  Date of Admission: 8-  Date of Discharge: 8-  Reason(s) for Admission: NSTEMI and Lung Abcess    Was your hospitalization related to COVID-19? No   Problems taking medications regularly:  None  Medication changes since discharge: None  Problems adhering to non-medication therapy:  None    Summary of hospitalization:  [facility] discharge summary reviewed  Diagnostic Tests/Treatments reviewed.  Follow up needed: Cardiology and infectious disease as above  Other Healthcare Providers Involved in Patient s Care:         None  Update since discharge: improved.         Post Medication Reconciliation Status: Discharge medications reconciled, continue medications without change      Plan of care communicated with patient                 Review of Systems         Objective    /78 (BP Location: Right arm, Patient Position: Sitting, Cuff Size: Adult Large)   Pulse 90   Ht 1.803 m (5' 11\")   Wt 99.3 kg (219 lb)   SpO2 97%   BMI 30.54 kg/m    Body mass index is 30.54 kg/m .  Physical Exam                       .  ..  "

## 2022-08-30 NOTE — PATIENT INSTRUCTIONS
Finish the current antibiotic as planned.  Follow-up with infectious disease including a follow-up chest CT scan.    If you have any further chest pressure or pain, dial 911 and go to emergency room immediately for evaluation.    Follow-up with cardiology next month as planned.    If you have worsening muscle achiness, that may be from your rosuvastatin, and contact this clinic or cardiology right away to see if you need further evaluation.    Plan to continue on the Brilinta for 1 year.  You will continue on aspirin daily long-term.    If you cannot find an alternative primary care physician to establish care with, see me in 1 month for follow-up appointment.

## 2022-09-08 ENCOUNTER — HOSPITAL ENCOUNTER (OUTPATIENT)
Dept: CT IMAGING | Facility: CLINIC | Age: 57
Discharge: HOME | End: 2022-09-08
Attending: HOSPITALIST | Admitting: HOSPITALIST
Payer: COMMERCIAL

## 2022-09-08 ENCOUNTER — HOSPITAL ENCOUNTER (OUTPATIENT)
Dept: CARDIAC REHAB | Facility: CLINIC | Age: 57
Discharge: HOME | End: 2022-09-08
Attending: INTERNAL MEDICINE
Payer: COMMERCIAL

## 2022-09-08 DIAGNOSIS — J85.2 ABSCESS OF UPPER LOBE OF RIGHT LUNG WITHOUT PNEUMONIA (H): ICD-10-CM

## 2022-09-08 DIAGNOSIS — I21.4 NSTEMI (NON-ST ELEVATED MYOCARDIAL INFARCTION) (H): ICD-10-CM

## 2022-09-08 PROCEDURE — 93797 PHYS/QHP OP CAR RHAB WO ECG: CPT

## 2022-09-08 PROCEDURE — 93798 PHYS/QHP OP CAR RHAB W/ECG: CPT

## 2022-09-08 PROCEDURE — 71250 CT THORAX DX C-: CPT

## 2022-09-09 ENCOUNTER — OFFICE VISIT (OUTPATIENT)
Dept: CARDIOLOGY | Facility: CLINIC | Age: 57
End: 2022-09-09
Payer: COMMERCIAL

## 2022-09-09 VITALS
SYSTOLIC BLOOD PRESSURE: 128 MMHG | HEART RATE: 73 BPM | BODY MASS INDEX: 29.96 KG/M2 | DIASTOLIC BLOOD PRESSURE: 84 MMHG | WEIGHT: 214 LBS | HEIGHT: 71 IN | RESPIRATION RATE: 16 BRPM

## 2022-09-09 DIAGNOSIS — E78.5 DYSLIPIDEMIA: ICD-10-CM

## 2022-09-09 DIAGNOSIS — I25.10 CORONARY ARTERY DISEASE INVOLVING NATIVE CORONARY ARTERY OF NATIVE HEART WITHOUT ANGINA PECTORIS: Primary | ICD-10-CM

## 2022-09-09 DIAGNOSIS — I21.4 NSTEMI (NON-ST ELEVATED MYOCARDIAL INFARCTION) (H): ICD-10-CM

## 2022-09-09 DIAGNOSIS — I10 ESSENTIAL HYPERTENSION: ICD-10-CM

## 2022-09-09 PROCEDURE — 99214 OFFICE O/P EST MOD 30 MIN: CPT | Performed by: NURSE PRACTITIONER

## 2022-09-09 RX ORDER — FAMOTIDINE 40 MG/1
40 TABLET, FILM COATED ORAL PRN
COMMUNITY
Start: 2022-08-20

## 2022-09-09 NOTE — LETTER
9/9/2022    Mio Gtz MD  [address] [place] [address]    RE: Jose Raul Larsen       Dear Colleague,     I had the pleasure of seeing Jose Raul Larsen in the [facility].  HEART CARE PROGRESS NOTE      [facility]  [phone]      Assessment/Recommendations   1.  Coronary artery disease: He was hospitalized August 21 to August 25 with chest pain.  Troponins were elevated. On August 24, 2022 he had PCI with drug-eluting stent to left circumflex.  Dual antiplatelet therapy is being used with aspirin indefinitely and ticagrelor for 1 year.  Puncture site is soft with healing bruise of right forearm.      Risk factor modification and lifestyle management topics were discussed including managing comorbidities, weight loss, heart healthy diet, exercise and alcohol use.  He is participating in cardiac rehab.    2.  Dyslipidemia: Jose Raul Larsen is on high intensity statin therapy with rosuvastatin 40 mg daily.  His LDL is 76.  Recommend rechecking cholesterol in 2 months.  We discussed a diet low in saturated fat, weight loss, and exercise along with medication for better control of cholesterol.     3.  Hypertension: His blood pressure is 128/84.    4.  Cavitary lesion in right lung: He completed antibiotics yesterday.  He will follow-up with infectious disease.    Hipolito will follow-up with Dr. Rizvi in 6 weeks.       History of Present Illness/Subjective    Jose Raul Larsen is seen at [facility] for post coronary intervention follow up.  He has a history of hypertension.  He was hospitalized August 21 to August 25, 2022 with chest pain.  He was found to have a cavitary lesion in his right lung and was started on antibiotics.  His troponin was elevated.  CTA was abnormal.  On August 24, 2022 he had PCI with drug-eluting stent to left circumflex.  Dual antiplatelet therapy is being used with aspirin indefinitely and ticagrelor for 1 year.   "Echocardiogram on August 22, 2022 showed ejection fraction of 60 to 65%.    He denies any chest pain or shortness of breath.  He has had a persistent cough for the past few months and feels that it is improving slowly.  He has had headaches daily his entire adult life.  He takes Tylenol with some relief.  He denies fatigue, lightheadedness and lower extremity edema.  He walks his dogs daily and is participating in cardiac rehab.  He denies any symptoms.    Cardiac Catheterization    Result Date: 8/24/2022    NSTEMI in a many with hypertension and hyperlipidemia.    Widely patent left main    The LAD has mild proximal and moderate mid vessel disease.    The circumflex is a large artery that feeds a large OM-3 system. There   is severe mid circumflex disease extending into the OM-3. The lesion was   covered with a Synergy JAZMIN. The small circumflex continuation had ELI-2   flow due to plaque shift, this resolved with balloon into the circumflex   continuation through the stent struts.    The RCA has serial areas of ectasia in the proximal and mid vessel.   There are moderate stenoses between the areas of ectasia.    LV EDP normal. LV-Ao pressure gradient 13 mmhg.             Physical Examination Review of Systems   /84 (BP Location: Right arm, Patient Position: Sitting, Cuff Size: Adult Regular)   Pulse 73   Resp 16   Ht 1.803 m (5' 11\")   Wt 97.1 kg (214 lb)   BMI 29.85 kg/m    Body mass index is 29.85 kg/m .  Wt Readings from Last 3 Encounters:   09/09/22 97.1 kg (214 lb)   08/30/22 99.3 kg (219 lb)   08/25/22 101.2 kg (223 lb)       General Appearance:     Alert, cooperative and in no acute distress.   ENT/Mouth: membranes moist, no oral lesions or bleeding gums.      EYES:  no scleral icterus, normal conjunctivae   Chest/Lungs:   lungs are clear to auscultation, no rales or wheezing, respirations unlabored   Cardiovascular:   Regular. Normal first and second heart sounds, no edema bilateral lower " extremities    Abdomen:  Soft, nontender, nondistended, bowel sounds present   Extremities: no cyanosis or clubbing   Skin:  Neurologic: warm, dry.  mood and affect are appropriate, alert and oriented x3     Puncture Site: Right radial site is soft with healing bruise on right forearm.  CMS intact.           Please refer above for cardiac ROS details.      Medical History  Surgical History Family History Social History   No past medical history on file.  Past Surgical History:   Procedure Laterality Date     CV CORONARY ANGIOGRAM N/A 8/24/2022    Procedure: CV CORONARY ANGIOGRAM;  Surgeon: Alize Ortega MD;  Location: [facility]     CV LEFT HEART CATH N/A 8/24/2022    Procedure: Left Heart Catheterization;  Surgeon: Alize Ortega MD;  Location: [facility]     CV PCI STENT DRUG ELUTING N/A 8/24/2022    Procedure: Percutaneous Coronary Intervention Stent;  Surgeon: Alize Ortega MD;  Location: [facility]     ZZC APPENDECTOMY      Description: Appendectomy;  Recorded: 02/19/2010;     No family history on file. Social History     Socioeconomic History     Marital status:      Spouse name: Not on file     Number of children: Not on file     Years of education: Not on file     Highest education level: Not on file   Occupational History     Not on file   Tobacco Use     Smoking status: Never Smoker     Smokeless tobacco: Never Used   Vaping Use     Vaping Use: Never used   Substance and Sexual Activity     Alcohol use: Not on file     Drug use: Not on file     Sexual activity: Not on file   Other Topics Concern     Not on file   Social History Narrative     Not on file     Social Determinants of Health     Financial Resource Strain: Not on file   Food Insecurity: Not on file   Transportation Needs: Not on file   Physical Activity: Not on file   Stress: Not on file   Social Connections: Not on file   Intimate Partner Violence: Not on file   Housing Stability: Not on file     "      Medications  Allergies   Current Outpatient Medications   Medication Sig Dispense Refill     aspirin (ASA) 81 MG chewable tablet Take 1 tablet (81 mg) by mouth daily Starting tomorrow. 30 tablet 3     famotidine (PEPCID) 40 MG tablet Take 40 mg by mouth       fish oil-omega-3 fatty acids 1000 MG capsule Take 1 g by mouth 2 times daily (with meals) Take after lunch and dinner.       losartan (COZAAR) 25 MG tablet Take 25 mg by mouth every morning       Misc Natural Products (OSTEO BI-FLEX JOINT SHIELD) TABS Take 1 tablet by mouth 2 times daily (with meals) Take after lunch and dinner.       nitroGLYcerin (NITROSTAT) 0.4 MG sublingual tablet One tablet under the tongue every 5 minutes if needed for chest pain. May repeat every 5 minutes for a maximum of 3 doses in 15 minutes\" 25 tablet 3     rosuvastatin (CRESTOR) 40 MG tablet Take 1 tablet (40 mg) by mouth daily 90 tablet 3     ticagrelor (BRILINTA) 90 MG tablet Take 1 tablet (90 mg) by mouth 2 times daily Dose to start tomorrow morning. 180 tablet 3     vitamin D3 (CHOLECALCIFEROL) 50 mcg (2000 units) tablet Take 50 mcg by mouth every morning      Allergies   Allergen Reactions     Cephalexin Hives and Rash         Lab Results    Chemistry/lipid CBC Cardiac Enzymes/BNP/TSH/INR   Recent Labs   Lab Test 08/23/22  1611   CHOL 169   HDL 33*   LDL 76   TRIG 301*     Recent Labs   Lab Test 08/23/22  1611   LDL 76     Recent Labs   Lab Test 08/25/22  0440      POTASSIUM 3.7   CHLORIDE 107   CO2 23   GLC 97   BUN 13   CR 0.99   GFRESTIMATED 89   LEAH 8.9     Recent Labs   Lab Test 08/25/22  0440 08/24/22  0511 08/22/22  1408   CR 0.99 0.94 1.2     No results for input(s): A1C in the last 51031 hours. Recent Labs   Lab Test 08/25/22  0440 08/23/22  1703   WBC  --  7.6   HGB 14.8 15.5   HCT  --  43.3   MCV  --  86    215     Recent Labs   Lab Test 08/25/22  0440 08/23/22  1703 08/22/22  0436   HGB 14.8 15.5 15.4    Recent Labs   Lab Test 08/24/22  0511 " 08/22/22  1140 08/22/22  0436   TROPONINI 0.40* 0.46* 0.26     Recent Labs   Lab Test 08/21/22  1613   BNP 38     No results for input(s): TSH in the last 96883 hours.  No results for input(s): INR in the last 81099 hours.                   Thank you for allowing me to participate in the care of your patient.      Sincerely,     Adina Daigle NP     [facility]  cc:   No referring provider defined for this encounter.

## 2022-09-09 NOTE — PROGRESS NOTES
HEART CARE PROGRESS NOTE      [facility]  [phone]      Assessment/Recommendations   1.  Coronary artery disease: He was hospitalized August 21 to August 25 with chest pain.  Troponins were elevated. On August 24, 2022 he had PCI with drug-eluting stent to left circumflex.  Dual antiplatelet therapy is being used with aspirin indefinitely and ticagrelor for 1 year.  Puncture site is soft with healing bruise of right forearm.      Risk factor modification and lifestyle management topics were discussed including managing comorbidities, weight loss, heart healthy diet, exercise and alcohol use.  He is participating in cardiac rehab.    2.  Dyslipidemia: Jose Raul Larsen is on high intensity statin therapy with rosuvastatin 40 mg daily.  His LDL is 76.  Recommend rechecking cholesterol in 2 months.  We discussed a diet low in saturated fat, weight loss, and exercise along with medication for better control of cholesterol.     3.  Hypertension: His blood pressure is 128/84.    4.  Cavitary lesion in right lung: He completed antibiotics yesterday.  He will follow-up with infectious disease.    Hipolito will follow-up with Dr. Rizvi in 6 weeks.       History of Present Illness/Subjective    Jose Raul Larsen is seen at [facility] for post coronary intervention follow up.  He has a history of hypertension.  He was hospitalized August 21 to August 25, 2022 with chest pain.  He was found to have a cavitary lesion in his right lung and was started on antibiotics.  His troponin was elevated.  CTA was abnormal.  On August 24, 2022 he had PCI with drug-eluting stent to left circumflex.  Dual antiplatelet therapy is being used with aspirin indefinitely and ticagrelor for 1 year.  Echocardiogram on August 22, 2022 showed ejection fraction of 60 to 65%.    He denies any chest pain or shortness of breath.  He has had a persistent cough for the past few months and feels that it is improving  "slowly.  He has had headaches daily his entire adult life.  He takes Tylenol with some relief.  He denies fatigue, lightheadedness and lower extremity edema.  He walks his dogs daily and is participating in cardiac rehab.  He denies any symptoms.    Cardiac Catheterization    Result Date: 8/24/2022    NSTEMI in a many with hypertension and hyperlipidemia.    Widely patent left main    The LAD has mild proximal and moderate mid vessel disease.    The circumflex is a large artery that feeds a large OM-3 system. There   is severe mid circumflex disease extending into the OM-3. The lesion was   covered with a Synergy JAZMIN. The small circumflex continuation had ELI-2   flow due to plaque shift, this resolved with balloon into the circumflex   continuation through the stent struts.    The RCA has serial areas of ectasia in the proximal and mid vessel.   There are moderate stenoses between the areas of ectasia.    LV EDP normal. LV-Ao pressure gradient 13 mmhg.             Physical Examination Review of Systems   /84 (BP Location: Right arm, Patient Position: Sitting, Cuff Size: Adult Regular)   Pulse 73   Resp 16   Ht 1.803 m (5' 11\")   Wt 97.1 kg (214 lb)   BMI 29.85 kg/m    Body mass index is 29.85 kg/m .  Wt Readings from Last 3 Encounters:   09/09/22 97.1 kg (214 lb)   08/30/22 99.3 kg (219 lb)   08/25/22 101.2 kg (223 lb)       General Appearance:     Alert, cooperative and in no acute distress.   ENT/Mouth: membranes moist, no oral lesions or bleeding gums.      EYES:  no scleral icterus, normal conjunctivae   Chest/Lungs:   lungs are clear to auscultation, no rales or wheezing, respirations unlabored   Cardiovascular:   Regular. Normal first and second heart sounds, no edema bilateral lower extremities    Abdomen:  Soft, nontender, nondistended, bowel sounds present   Extremities: no cyanosis or clubbing   Skin:  Neurologic: warm, dry.  mood and affect are appropriate, alert and oriented x3     Puncture " Site: Right radial site is soft with healing bruise on right forearm.  CMS intact.           Please refer above for cardiac ROS details.      Medical History  Surgical History Family History Social History   No past medical history on file.  Past Surgical History:   Procedure Laterality Date     CV CORONARY ANGIOGRAM N/A 8/24/2022    Procedure: CV CORONARY ANGIOGRAM;  Surgeon: Alize Ortega MD;  Location: [facility] CV     CV LEFT HEART CATH N/A 8/24/2022    Procedure: Left Heart Catheterization;  Surgeon: Alize Ortega MD;  Location: [facility] CV     CV PCI STENT DRUG ELUTING N/A 8/24/2022    Procedure: Percutaneous Coronary Intervention Stent;  Surgeon: Alize Ortega MD;  Location: [facility] CV     ZZC APPENDECTOMY      Description: Appendectomy;  Recorded: 02/19/2010;     No family history on file. Social History     Socioeconomic History     Marital status:      Spouse name: Not on file     Number of children: Not on file     Years of education: Not on file     Highest education level: Not on file   Occupational History     Not on file   Tobacco Use     Smoking status: Never Smoker     Smokeless tobacco: Never Used   Vaping Use     Vaping Use: Never used   Substance and Sexual Activity     Alcohol use: Not on file     Drug use: Not on file     Sexual activity: Not on file   Other Topics Concern     Not on file   Social History Narrative     Not on file     Social Determinants of Health     Financial Resource Strain: Not on file   Food Insecurity: Not on file   Transportation Needs: Not on file   Physical Activity: Not on file   Stress: Not on file   Social Connections: Not on file   Intimate Partner Violence: Not on file   Housing Stability: Not on file          Medications  Allergies   Current Outpatient Medications   Medication Sig Dispense Refill     aspirin (ASA) 81 MG chewable tablet Take 1 tablet (81 mg) by mouth daily Starting tomorrow. 30 tablet 3     famotidine  "(PEPCID) 40 MG tablet Take 40 mg by mouth       fish oil-omega-3 fatty acids 1000 MG capsule Take 1 g by mouth 2 times daily (with meals) Take after lunch and dinner.       losartan (COZAAR) 25 MG tablet Take 25 mg by mouth every morning       Misc Natural Products (OSTEO BI-FLEX JOINT SHIELD) TABS Take 1 tablet by mouth 2 times daily (with meals) Take after lunch and dinner.       nitroGLYcerin (NITROSTAT) 0.4 MG sublingual tablet One tablet under the tongue every 5 minutes if needed for chest pain. May repeat every 5 minutes for a maximum of 3 doses in 15 minutes\" 25 tablet 3     rosuvastatin (CRESTOR) 40 MG tablet Take 1 tablet (40 mg) by mouth daily 90 tablet 3     ticagrelor (BRILINTA) 90 MG tablet Take 1 tablet (90 mg) by mouth 2 times daily Dose to start tomorrow morning. 180 tablet 3     vitamin D3 (CHOLECALCIFEROL) 50 mcg (2000 units) tablet Take 50 mcg by mouth every morning      Allergies   Allergen Reactions     Cephalexin Hives and Rash         Lab Results    Chemistry/lipid CBC Cardiac Enzymes/BNP/TSH/INR   Recent Labs   Lab Test 08/23/22  1611   CHOL 169   HDL 33*   LDL 76   TRIG 301*     Recent Labs   Lab Test 08/23/22  1611   LDL 76     Recent Labs   Lab Test 08/25/22  0440      POTASSIUM 3.7   CHLORIDE 107   CO2 23   GLC 97   BUN 13   CR 0.99   GFRESTIMATED 89   LEAH 8.9     Recent Labs   Lab Test 08/25/22  0440 08/24/22  0511 08/22/22  1408   CR 0.99 0.94 1.2     No results for input(s): A1C in the last 91589 hours. Recent Labs   Lab Test 08/25/22  0440 08/23/22  1703   WBC  --  7.6   HGB 14.8 15.5   HCT  --  43.3   MCV  --  86    215     Recent Labs   Lab Test 08/25/22  0440 08/23/22  1703 08/22/22  0436   HGB 14.8 15.5 15.4    Recent Labs   Lab Test 08/24/22  0511 08/22/22  1140 08/22/22  0436   TROPONINI 0.40* 0.46* 0.26     Recent Labs   Lab Test 08/21/22  1613   BNP 38     No results for input(s): TSH in the last 93444 hours.  No results for input(s): INR in the last 97221 hours. "

## 2022-09-09 NOTE — PATIENT INSTRUCTIONS
Jose Raul Larsen,    It was a pleasure to see you today at the [facility].     My recommendations after this visit include:  - No changes to your medication.   - See Dr. Rizvi in 6 weeks.   - If you have any questions or concerns, please call [phone] to talk with my nurse.    Adina Musa, CNP

## 2022-09-13 ENCOUNTER — HOSPITAL ENCOUNTER (OUTPATIENT)
Dept: CARDIAC REHAB | Facility: CLINIC | Age: 57
Discharge: HOME | End: 2022-09-13
Attending: INTERNAL MEDICINE
Payer: COMMERCIAL

## 2022-09-13 PROCEDURE — 93798 PHYS/QHP OP CAR RHAB W/ECG: CPT

## 2022-09-13 PROCEDURE — 93797 PHYS/QHP OP CAR RHAB WO ECG: CPT

## 2022-09-15 ENCOUNTER — OFFICE VISIT (OUTPATIENT)
Dept: INFECTIOUS DISEASES | Facility: CLINIC | Age: 57
End: 2022-09-15
Payer: COMMERCIAL

## 2022-09-15 ENCOUNTER — LAB (OUTPATIENT)
Dept: LAB | Facility: CLINIC | Age: 57
End: 2022-09-15

## 2022-09-15 VITALS
BODY MASS INDEX: 30.13 KG/M2 | HEART RATE: 63 BPM | OXYGEN SATURATION: 96 % | SYSTOLIC BLOOD PRESSURE: 128 MMHG | WEIGHT: 216 LBS | TEMPERATURE: 98.1 F | DIASTOLIC BLOOD PRESSURE: 80 MMHG

## 2022-09-15 DIAGNOSIS — J85.2 ABSCESS OF UPPER LOBE OF RIGHT LUNG WITHOUT PNEUMONIA (H): ICD-10-CM

## 2022-09-15 DIAGNOSIS — J85.2 ABSCESS OF UPPER LOBE OF RIGHT LUNG WITHOUT PNEUMONIA (H): Primary | ICD-10-CM

## 2022-09-15 PROCEDURE — 86481 TB AG RESPONSE T-CELL SUSP: CPT

## 2022-09-15 PROCEDURE — 86606 ASPERGILLUS ANTIBODY: CPT | Mod: 90

## 2022-09-15 PROCEDURE — 99000 SPECIMEN HANDLING OFFICE-LAB: CPT

## 2022-09-15 PROCEDURE — 86698 HISTOPLASMA ANTIBODY: CPT | Mod: 90

## 2022-09-15 PROCEDURE — 36415 COLL VENOUS BLD VENIPUNCTURE: CPT

## 2022-09-15 PROCEDURE — 86612 BLASTOMYCES ANTIBODY: CPT | Mod: 90

## 2022-09-15 PROCEDURE — 86635 COCCIDIOIDES ANTIBODY: CPT | Mod: 90

## 2022-09-15 PROCEDURE — 99214 OFFICE O/P EST MOD 30 MIN: CPT | Performed by: INTERNAL MEDICINE

## 2022-09-15 NOTE — PROGRESS NOTES
[facility] INFECTIOUS DISEASE CLINIC [facility]   FOLLOW UP NOTE    Date: 9/15/2022  Patient Name: Jose Raul Larsen   YOB: 1965  MRN: [id number]      ASSESSMENT:  57-year-old man admitted to the hospital last month with chest pain, and found to have a 1.6 cm cavitary lesion in the right upper lobe.  Here for follow-up.    1. Cavitary lesion.  No respiratory symptoms during hospital stay.  Was unable to make a good sputum sample.  Suspect that lesion was secondary to pneumonia back in 2020, with residual scarring.  Fungal work-up (antibody, antigen test, Fungitell) was all negative.  ANCA negative.  Completed a 2-week course of levofloxacin.  No current respiratory symptoms or B symptoms.  Repeat CT scan is unchanged.  2. Coronary artery disease.  Ultimately underwent coronary angiogram with stent placement.  Has been working on diet and exercise.  Losing weight as a result of this.      PLAN:  -Repeat fungal antibodies  -QuantiFERON TB Gold  -Recommend follow-up CT.  I will contact Dr. Demarco, from pulmonary, to see if she has any additional recommendations      Brian East MD  [facility]   Clinic phone: [phone]  Clinic fax: [phone]    ______________________________________________________________________    HISTORY OF PRESENT ILLNESS:   From inpatient ID consult on 8/22/2022:      Jose Raul Larsen is a 57 year old male with a past medical history of hypertension, recent COVID-19 infection secondhand smoking exposure presented with exertional chest pressure who was admitted on 8/21/2022 for1.6 cm cavitary right lung lesion.      Jose Raul states he feels well this morning. He has not experienced chest pain/pressure since he was admitted.   Patient reports he tested positive for covid-19 on 8/5. He had mild symptoms for a couple of days while he self-isolated. He does not currently endorse any respiratory symptoms at this time.        SUBJECTIVE / INTERVAL HISTORY:  "  Jose Raul Larsen returns for follow up.  He was hospitalized from 8/21-8/25.  He was eventually transferred to [facility] where he underwent coronary angiogram with stent placement.  He has been adjusting his diet and working on exercise.    The patient reports a chronic cough that has been going on for months.  He has noted some improvement with this, but not resolution.  He denies any shortness of breath.  No fevers or night sweats.      ROS:   No fevers, no rashes      Current Outpatient Medications:      aspirin (ASA) 81 MG chewable tablet, Take 1 tablet (81 mg) by mouth daily Starting tomorrow., Disp: 30 tablet, Rfl: 3     fish oil-omega-3 fatty acids 1000 MG capsule, Take 1 g by mouth 2 times daily (with meals) Take after lunch and dinner., Disp: , Rfl:      losartan (COZAAR) 25 MG tablet, Take 25 mg by mouth every morning, Disp: , Rfl:      rosuvastatin (CRESTOR) 40 MG tablet, Take 1 tablet (40 mg) by mouth daily, Disp: 90 tablet, Rfl: 3     ticagrelor (BRILINTA) 90 MG tablet, Take 1 tablet (90 mg) by mouth 2 times daily Dose to start tomorrow morning., Disp: 180 tablet, Rfl: 3     vitamin D3 (CHOLECALCIFEROL) 50 mcg (2000 units) tablet, Take 50 mcg by mouth every morning, Disp: , Rfl:      famotidine (PEPCID) 40 MG tablet, Take 40 mg by mouth (Patient not taking: Reported on 9/15/2022), Disp: , Rfl:      Misc Natural Products (OSTEO BI-FLEX JOINT SHIELD) TABS, Take 1 tablet by mouth 2 times daily (with meals) Take after lunch and dinner. (Patient not taking: Reported on 9/15/2022), Disp: , Rfl:      nitroGLYcerin (NITROSTAT) 0.4 MG sublingual tablet, One tablet under the tongue every 5 minutes if needed for chest pain. May repeat every 5 minutes for a maximum of 3 doses in 15 minutes\" (Patient not taking: Reported on 9/15/2022), Disp: 25 tablet, Rfl: 3      OBJECTIVE:  /80 (BP Location: Right arm, Patient Position: Sitting, Cuff Size: Adult Regular)   Pulse 63   Temp 98.1  F (36.7  C)   " Wt 98 kg (216 lb)   SpO2 96%   BMI 30.13 kg/m        GEN: No acute distress.    HENT: Head is normocephalic, atraumatic.   EYES: Eyes have anicteric sclerae without conjunctival injection   RESPIRATORY:  Normal breathing pattern. Clear to auscultation  CARDIOVASCULAR:  Regular rate and rhythm. Normal S1 and S2. No murmur, click, gallop or rub.   EXTREMITIES: No edema.   SKIN/HAIR/NAILS:  No rashes  NEUROLOGIC: Grossly nonfocal. Normal gait and station      Pertinent labs:    Lab Results   Component Value Date    CRP <0.1 08/21/2022         Lab Results   Component Value Date    ALT 28 08/21/2022    AST 20 08/21/2022    ALKPHOS 54 08/21/2022         MICROBIOLOGY DATA:  Reviewed    RADIOLOGY:  EXAM: CT CHEST W/O CONTRAST  LOCATION: [facility]  DATE/TIME: 9/8/2022 2:44 PM     INDICATION: Abscess of upper lobe of right lung without pneumonia (H).  COMPARISON: 8/22/2022 Limited CT chest for CT coronary angiogram and 8/21/2022 CTA chest.  TECHNIQUE: CT chest without IV contrast. Multiplanar reformats were obtained. Dose reduction techniques were used.  CONTRAST: None.     FINDINGS:   LUNGS AND PLEURA: Irregularly marginated cavitary lesion posterolateral right upper lobe (2.0 cm ML, 1.6 cm AP and 1.7 cm CC, anteromedial mural thickness 3 mm, posterolateral mural thickness 8 mm) and satellite nodularity scattered throughout the lung   parenchyma just anterior to the cavity, all unchanged. Lungs otherwise clear. No pleural effusion.     MEDIASTINUM/AXILLAE: No lymphadenopathy. Interval stenting proximal and mid left circumflex coronary artery. Heart size normal with no pericardial effusion. No lymphadenopathy. Normal caliber thoracic aorta and central pulmonary arteries.     CORONARY ARTERY CALCIFICATION: Previous intervention (stents or CABG).     UPPER ABDOMEN: No significant finding.     MUSCULOSKELETAL: Unremarkable.                                                                       IMPRESSION:   1.  Irregularly marginated cavitary lesion with adjacent satellite nodularity posterolateral right upper lobe has not changed significantly in the short interval since 8/22/2022. Findings could represent slowly resolving sequela to prior granulomatous   inflammation/infection such as fungal or mycobacterial infection. Satellite nodularity is highly unusual for malignancy but follow-up to ensure resolution after appropriate therapy is recommended.  2.  Interval stenting proximal and mid left circumflex coronary artery.       Attestation:  Total time preparing to see this patient, face-to-face time, and coordinating care time on the same calendar date: 34 minutes.  Face-face time: 15 minutes.  Over 50% of face-to-face time was spent in counseling/coordination of care.

## 2022-09-16 ENCOUNTER — HOSPITAL ENCOUNTER (OUTPATIENT)
Dept: CARDIAC REHAB | Facility: CLINIC | Age: 57
Discharge: HOME | End: 2022-09-16
Attending: INTERNAL MEDICINE
Payer: COMMERCIAL

## 2022-09-16 LAB
QUANTIFERON MITOGEN: 10 IU/ML
QUANTIFERON NIL TUBE: 0.01 IU/ML
QUANTIFERON TB1 TUBE: 0.01 IU/ML
QUANTIFERON TB2 TUBE: 0.05

## 2022-09-16 PROCEDURE — 93798 PHYS/QHP OP CAR RHAB W/ECG: CPT

## 2022-09-18 LAB
ASPERGILLUS AB TITR SER CF: NORMAL {TITER}
B DERMAT AB SER-ACNC: 0.8 IV
COCCIDIOIDES AB TITR SER CF: NORMAL {TITER}
GAMMA INTERFERON BACKGROUND BLD IA-ACNC: 0.01 IU/ML
H CAPSUL MYC AB TITR SER CF: NORMAL {TITER}
H CAPSUL YST AB TITR SER CF: NORMAL {TITER}
M TB IFN-G BLD-IMP: NEGATIVE
M TB IFN-G CD4+ BCKGRND COR BLD-ACNC: 9.99 IU/ML
MITOGEN IGNF BCKGRD COR BLD-ACNC: 0 IU/ML
MITOGEN IGNF BCKGRD COR BLD-ACNC: 0.04 IU/ML

## 2022-09-20 ENCOUNTER — HOSPITAL ENCOUNTER (OUTPATIENT)
Dept: CARDIAC REHAB | Facility: CLINIC | Age: 57
Discharge: HOME | End: 2022-09-20
Attending: INTERNAL MEDICINE
Payer: COMMERCIAL

## 2022-09-20 PROCEDURE — 93798 PHYS/QHP OP CAR RHAB W/ECG: CPT

## 2022-09-22 ENCOUNTER — HOSPITAL ENCOUNTER (OUTPATIENT)
Dept: CARDIAC REHAB | Facility: CLINIC | Age: 57
Discharge: HOME | End: 2022-09-22
Attending: INTERNAL MEDICINE
Payer: COMMERCIAL

## 2022-09-22 PROCEDURE — 93798 PHYS/QHP OP CAR RHAB W/ECG: CPT

## 2022-09-23 ENCOUNTER — TEAM CONFERENCE (OUTPATIENT)
Dept: PULMONOLOGY | Facility: CLINIC | Age: 57
End: 2022-09-23

## 2022-09-26 ENCOUNTER — OFFICE VISIT (OUTPATIENT)
Dept: INTERNAL MEDICINE | Facility: CLINIC | Age: 57
End: 2022-09-26
Payer: COMMERCIAL

## 2022-09-26 VITALS
HEART RATE: 71 BPM | DIASTOLIC BLOOD PRESSURE: 88 MMHG | BODY MASS INDEX: 29.99 KG/M2 | WEIGHT: 215 LBS | SYSTOLIC BLOOD PRESSURE: 128 MMHG | OXYGEN SATURATION: 97 %

## 2022-09-26 DIAGNOSIS — I25.10 CORONARY ARTERY DISEASE DUE TO CALCIFIED CORONARY LESION: Primary | ICD-10-CM

## 2022-09-26 DIAGNOSIS — Z11.59 NEED FOR HEPATITIS C SCREENING TEST: ICD-10-CM

## 2022-09-26 DIAGNOSIS — Z51.81 ENCOUNTER FOR THERAPEUTIC DRUG MONITORING: ICD-10-CM

## 2022-09-26 DIAGNOSIS — I25.84 CORONARY ARTERY DISEASE DUE TO CALCIFIED CORONARY LESION: Primary | ICD-10-CM

## 2022-09-26 DIAGNOSIS — I10 ESSENTIAL HYPERTENSION: ICD-10-CM

## 2022-09-26 LAB
ALBUMIN SERPL BCG-MCNC: 4.7 G/DL (ref 3.5–5.2)
ALP SERPL-CCNC: 54 U/L (ref 40–129)
ALT SERPL W P-5'-P-CCNC: 44 U/L (ref 10–50)
ANION GAP SERPL CALCULATED.3IONS-SCNC: 7 MMOL/L (ref 7–15)
AST SERPL W P-5'-P-CCNC: 32 U/L (ref 10–50)
BILIRUB SERPL-MCNC: 1.8 MG/DL
BUN SERPL-MCNC: 15 MG/DL (ref 6–20)
CALCIUM SERPL-MCNC: 10 MG/DL (ref 8.6–10)
CHLORIDE SERPL-SCNC: 101 MMOL/L (ref 98–107)
CHOLEST SERPL-MCNC: 109 MG/DL
CK SERPL-CCNC: 109 U/L (ref 39–308)
CREAT SERPL-MCNC: 1.1 MG/DL (ref 0.67–1.17)
DEPRECATED HCO3 PLAS-SCNC: 29 MMOL/L (ref 22–29)
GFR SERPL CREATININE-BSD FRML MDRD: 78 ML/MIN/1.73M2
GLUCOSE SERPL-MCNC: 102 MG/DL (ref 70–99)
HCV AB SERPL QL IA: NONREACTIVE
HDLC SERPL-MCNC: 42 MG/DL
LDLC SERPL CALC-MCNC: 44 MG/DL
NONHDLC SERPL-MCNC: 67 MG/DL
POTASSIUM SERPL-SCNC: 4.6 MMOL/L (ref 3.4–5.3)
PROT SERPL-MCNC: 7.8 G/DL (ref 6.4–8.3)
SODIUM SERPL-SCNC: 137 MMOL/L (ref 136–145)
TRIGL SERPL-MCNC: 115 MG/DL

## 2022-09-26 PROCEDURE — 82550 ASSAY OF CK (CPK): CPT | Performed by: INTERNAL MEDICINE

## 2022-09-26 PROCEDURE — 86803 HEPATITIS C AB TEST: CPT | Performed by: INTERNAL MEDICINE

## 2022-09-26 PROCEDURE — 80061 LIPID PANEL: CPT | Performed by: INTERNAL MEDICINE

## 2022-09-26 PROCEDURE — 99214 OFFICE O/P EST MOD 30 MIN: CPT | Performed by: INTERNAL MEDICINE

## 2022-09-26 PROCEDURE — 80053 COMPREHEN METABOLIC PANEL: CPT | Performed by: INTERNAL MEDICINE

## 2022-09-26 PROCEDURE — 36415 COLL VENOUS BLD VENIPUNCTURE: CPT | Performed by: INTERNAL MEDICINE

## 2022-09-26 RX ORDER — LOSARTAN POTASSIUM 25 MG/1
25 TABLET ORAL EVERY MORNING
Qty: 90 TABLET | Refills: 3 | Status: SHIPPED | OUTPATIENT
Start: 2022-09-26 | End: 2023-07-07

## 2022-09-26 NOTE — PATIENT INSTRUCTIONS
No change in medication treatment plan.    Make sure your blood pressure remains well controlled on your home checks and at cardiac rehab.  Goal blood pressure less than 135/85.    I recommend you wait to get a COVID booster until 90 days after your COVID illness.  Get a flu shot this fall.    See me in 3 months.  Plan to repeat your chest CT scan in 3 months.

## 2022-09-26 NOTE — PROGRESS NOTES
Jose Raul Larsen   57 year old male    Date of Visit: 9/26/2022    Chief Complaint   Patient presents with     Follow Up     Fasting, covid booster     Subjective  Jose Raul is a 57-year-old male who had recently moved to [place] and had a non-Q wave MI August 24, 2022.  No recurrent chest pain.  He started cardiac rehab and doing well.  Good exertional ability.    His blood pressure has been normal to mildly low at cardiac rehab.  Was down to 100/60 after exercise last week.  It was 114/66 before starting exercise.  He denies any orthostasis.    He has been on losartan 25 mg a day.  Normal creatinine last month.  Blood pressure 127/78 last month.    He did [place] this morning.  He did not take his losartan yet this morning.    No new chest pain or increasing shortness of breath or edema.    Family history of heart disease.  Never smoked.    Drug-eluting stent to the circumflex.  Moderate RCA and LAD disease on angiogram last month.    He is now on a new rosuvastatin 40 mg a day.  No generalized myalgias or right upper quadrant pain or history of liver problems.    No epigastric pain or GI bleeding on aspirin and Brilinta.  No falls.    No new cough.  Cough is largely resolved.  He did have COVID in early August.  Mild disease.  Incidental cavitary right lobe findings.  He was treated vancomycin and Levaquin in the hospital but AFB was negative.  Fungal was negative in the hospital.  He had a previous severe pneumonia in 2020 which he may develop with a cavitary lesion at that time.    He did see infectious disease earlier this month and TB quant to Farren gold test was negative.  Fungal serologies were negative.    No worsening shortness of breath.    Chest CT scan September 8, 2022 reviewed by me today was no change from the August CT scan.  Patient stated that infectious disease was planning a repeat chest CT scan in 3 to 6 months.        PMHx:  No past medical history on file.  PSHx:    Past Surgical  History:   Procedure Laterality Date     CV CORONARY ANGIOGRAM N/A 8/24/2022    Procedure: CV CORONARY ANGIOGRAM;  Surgeon: Alize Ortega MD;  Location: [facility] CV     CV LEFT HEART CATH N/A 8/24/2022    Procedure: Left Heart Catheterization;  Surgeon: Alize Ortega MD;  Location: [facility]     CV PCI STENT DRUG ELUTING N/A 8/24/2022    Procedure: Percutaneous Coronary Intervention Stent;  Surgeon: Alize Ortega MD;  Location: [facility] CV     ZZC APPENDECTOMY      Description: Appendectomy;  Recorded: 02/19/2010;     Immunizations:   Immunization History   Administered Date(s) Administered     COVID-19,PF,Pfizer (12+ Yrs) 03/08/2021, 03/31/2021, 12/30/2021     DT (PEDS <7y) 07/30/2001     FLU 6-35 months 09/23/2011     Influenza Vaccine, 6+MO IM (QUADRIVALENT W/PRESERVATIVES) 09/28/2012     Td,adult,historic,unspecified 02/19/2010     Tdap (Adacel,Boostrix) 02/19/2010       ROS A comprehensive review of systems was performed and was otherwise negative    Medications, allergies, and problem list were reviewed and updated    Exam  /88   Pulse 71   Wt 97.5 kg (215 lb)   SpO2 97%   BMI 29.99 kg/m    Appears well.  No jaundice.  Lungs clear.  Heart regular without murmur.  Abdomen is nontender.  No edema    Assessment/Plan  1. Coronary artery disease due to calcified coronary lesion  Asymptomatic.  Continue cardiac rehab.  Aspirin and Brilinta until next August, 1 year treatment plan.    Rosuvastatin 40 mg.  Continue cardiac rehab  - Lipid Profile    2. Essential hypertension  Borderline high especially diastolic this morning.  But patient has been getting lower blood pressures in cardiac rehab.  Continue current losartan.  Follow blood pressure at cardiac rehab.  Follow-up sooner if blood pressure is averaging above 135/85.  Otherwise to follow-up in 3 months  - losartan (COZAAR) 25 MG tablet; Take 1 tablet (25 mg) by mouth every morning  Dispense: 90 tablet; Refill:  "3    3. Need for hepatitis C screening test  Not suspected, patient agreed to screening  - Hepatitis C Screen Reflex to HCV RNA Quant and Genotype    4. Encounter for therapeutic drug monitoring    - Comprehensive metabolic panel  - CK total    Patient will wait for 3 months after his COVID illness to get the new booster.    Flu shot this fall, he declined today    Cavitary lung lesion, likely old scarring from previous pneumonia.  No evidence of active infection now that his COVID has cleared.  TB test was negative.  Fungal tests were negative.  I would plan to repeat chest CT scan in 3 months, I can order at next visit.      Return in about 3 months (around 12/26/2022) for Follow up.   Patient Instructions   No change in medication treatment plan.    Make sure your blood pressure remains well controlled on your home checks and at cardiac rehab.  Goal blood pressure less than 135/85.    I recommend you wait to get a COVID booster until 90 days after your COVID illness.  Get a flu shot this fall.    See me in 3 months.  Plan to repeat your chest CT scan in 3 months.    Mio Gtz MD, MD        Current Outpatient Medications   Medication Sig Dispense Refill     aspirin (ASA) 81 MG chewable tablet Take 1 tablet (81 mg) by mouth daily Starting tomorrow. 30 tablet 3     fish oil-omega-3 fatty acids 1000 MG capsule Take 1 g by mouth 2 times daily (with meals) Take after lunch and dinner.       losartan (COZAAR) 25 MG tablet Take 1 tablet (25 mg) by mouth every morning 90 tablet 3     Misc Natural Products (OSTEO BI-FLEX JOINT SHIELD) TABS Take 1 tablet by mouth 2 times daily (with meals) Take after lunch and dinner.       nitroGLYcerin (NITROSTAT) 0.4 MG sublingual tablet One tablet under the tongue every 5 minutes if needed for chest pain. May repeat every 5 minutes for a maximum of 3 doses in 15 minutes\" 25 tablet 3     rosuvastatin (CRESTOR) 40 MG tablet Take 1 tablet (40 mg) by mouth daily 90 tablet 3     " ticagrelor (BRILINTA) 90 MG tablet Take 1 tablet (90 mg) by mouth 2 times daily Dose to start tomorrow morning. 180 tablet 3     vitamin D3 (CHOLECALCIFEROL) 50 mcg (2000 units) tablet Take 50 mcg by mouth every morning       famotidine (PEPCID) 40 MG tablet Take 40 mg by mouth (Patient not taking: No sig reported)       Allergies   Allergen Reactions     Cephalexin Hives and Rash     Social History     Tobacco Use     Smoking status: Never Smoker     Smokeless tobacco: Never Used   Vaping Use     Vaping Use: Never used             Ed Blunt is a 57 year old, presenting for the following health issues:  Follow Up (Fasting, covid booster)      History of Present Illness       Hypertension: He presents for follow up of hypertension.  He does check blood pressure  regularly outside of the clinic. Outpatient blood pressures have not been over 140/90. He follows a low salt diet.     Vascular Disease:  He presents for follow up of vascular disease.  He takes daily aspirin.              Review of Systems         Objective    There were no vitals taken for this visit.  There is no height or weight on file to calculate BMI.  Physical Exam

## 2022-09-27 ENCOUNTER — HOSPITAL ENCOUNTER (OUTPATIENT)
Dept: CARDIAC REHAB | Facility: CLINIC | Age: 57
Discharge: HOME | End: 2022-09-27
Attending: INTERNAL MEDICINE
Payer: COMMERCIAL

## 2022-09-27 PROCEDURE — 93798 PHYS/QHP OP CAR RHAB W/ECG: CPT

## 2022-10-01 ENCOUNTER — HEALTH MAINTENANCE LETTER (OUTPATIENT)
Age: 57
End: 2022-10-01

## 2022-10-04 ENCOUNTER — HOSPITAL ENCOUNTER (OUTPATIENT)
Dept: CARDIAC REHAB | Facility: CLINIC | Age: 57
Discharge: HOME | End: 2022-10-04
Attending: INTERNAL MEDICINE
Payer: COMMERCIAL

## 2022-10-04 PROCEDURE — 93798 PHYS/QHP OP CAR RHAB W/ECG: CPT

## 2022-10-06 ENCOUNTER — HOSPITAL ENCOUNTER (OUTPATIENT)
Dept: CARDIAC REHAB | Facility: CLINIC | Age: 57
Discharge: HOME | End: 2022-10-06
Attending: INTERNAL MEDICINE
Payer: COMMERCIAL

## 2022-10-06 PROCEDURE — 93798 PHYS/QHP OP CAR RHAB W/ECG: CPT

## 2022-10-11 ENCOUNTER — HOSPITAL ENCOUNTER (OUTPATIENT)
Dept: CARDIAC REHAB | Facility: CLINIC | Age: 57
Discharge: HOME | End: 2022-10-11
Attending: INTERNAL MEDICINE
Payer: COMMERCIAL

## 2022-10-11 PROCEDURE — 93798 PHYS/QHP OP CAR RHAB W/ECG: CPT

## 2022-10-13 ENCOUNTER — HOSPITAL ENCOUNTER (OUTPATIENT)
Dept: CARDIAC REHAB | Facility: CLINIC | Age: 57
Discharge: HOME | End: 2022-10-13
Attending: INTERNAL MEDICINE
Payer: COMMERCIAL

## 2022-10-13 PROCEDURE — 93798 PHYS/QHP OP CAR RHAB W/ECG: CPT

## 2022-10-18 ENCOUNTER — HOSPITAL ENCOUNTER (OUTPATIENT)
Dept: CARDIAC REHAB | Facility: CLINIC | Age: 57
Discharge: HOME | End: 2022-10-18
Attending: INTERNAL MEDICINE
Payer: COMMERCIAL

## 2022-10-18 LAB
ACID FAST STAIN (ARUP): NORMAL

## 2022-10-18 PROCEDURE — 93798 PHYS/QHP OP CAR RHAB W/ECG: CPT

## 2022-10-20 ENCOUNTER — HOSPITAL ENCOUNTER (OUTPATIENT)
Dept: CARDIAC REHAB | Facility: CLINIC | Age: 57
Discharge: HOME | End: 2022-10-20
Attending: INTERNAL MEDICINE
Payer: COMMERCIAL

## 2022-10-20 PROCEDURE — 93798 PHYS/QHP OP CAR RHAB W/ECG: CPT

## 2022-10-25 ENCOUNTER — HOSPITAL ENCOUNTER (OUTPATIENT)
Dept: CARDIAC REHAB | Facility: CLINIC | Age: 57
Discharge: HOME | End: 2022-10-25
Attending: INTERNAL MEDICINE
Payer: COMMERCIAL

## 2022-10-25 PROCEDURE — 93798 PHYS/QHP OP CAR RHAB W/ECG: CPT

## 2022-10-27 ENCOUNTER — OFFICE VISIT (OUTPATIENT)
Dept: CARDIOLOGY | Facility: CLINIC | Age: 57
End: 2022-10-27
Payer: COMMERCIAL

## 2022-10-27 ENCOUNTER — HOSPITAL ENCOUNTER (OUTPATIENT)
Dept: CARDIAC REHAB | Facility: CLINIC | Age: 57
Discharge: HOME | End: 2022-10-27
Attending: INTERNAL MEDICINE
Payer: COMMERCIAL

## 2022-10-27 VITALS
HEART RATE: 88 BPM | SYSTOLIC BLOOD PRESSURE: 106 MMHG | RESPIRATION RATE: 16 BRPM | HEIGHT: 71 IN | WEIGHT: 212.5 LBS | BODY MASS INDEX: 29.75 KG/M2 | DIASTOLIC BLOOD PRESSURE: 82 MMHG

## 2022-10-27 DIAGNOSIS — I21.4 NSTEMI (NON-ST ELEVATED MYOCARDIAL INFARCTION) (H): ICD-10-CM

## 2022-10-27 PROCEDURE — 99214 OFFICE O/P EST MOD 30 MIN: CPT | Performed by: INTERNAL MEDICINE

## 2022-10-27 PROCEDURE — 93798 PHYS/QHP OP CAR RHAB W/ECG: CPT

## 2022-10-27 RX ORDER — ROSUVASTATIN CALCIUM 40 MG/1
40 TABLET, COATED ORAL DAILY
Qty: 90 TABLET | Refills: 3 | Status: SHIPPED | OUTPATIENT
Start: 2022-10-27 | End: 2023-07-07

## 2022-10-27 RX ORDER — NITROGLYCERIN 0.4 MG/1
TABLET SUBLINGUAL
Qty: 25 TABLET | Refills: 3 | Status: SHIPPED | OUTPATIENT
Start: 2022-10-27

## 2022-10-27 NOTE — LETTER
10/27/2022    Miowily Gtz MD  [address] [place] [address]    RE: Jose Raul Larsen       Dear Colleague,     I had the pleasure of seeing Jose Raul Larsen in the [facility].      [facility]  [phone]      Assessment/Recommendations   Patient with evaluation for chest discomfort, ultimately with coronary angiography and a stent placed in the circumflex system.  He has been stable since that time and has had no recurrent chest pressure.  He has been going through cardiac rehab with good vital signs and no new symptomatology.  He has some exercise equipment at home that he will transition to.    His LDL cholesterol is excellent, his blood pressure is at goal, and he is on dual antiplatelet agents.  Would recommend he continue dual antiplatelet agents for 1 year after his procedure which would be August 2023.    We will plan on seeing him back in 1 year with Conner and in 2 years with me.    He will call if he has any changes in his functional capacity or new symptomatology.    Thank you for allowing us to participate in his care.       History of Present Illness/Subjective    Mr. Jose Raul Larsen is a 57 year old male with known coronary artery disease with presentation with chest discomfort and August of this year.  He also had an incidental finding of a cavitary lesion in his lung on the CT scan and has been seen by infectious disease and tuberculosis has been ruled out.  This will be followed.    he had percutaneous intervention in the circumflex system by Dr. Schmidt.  He is on dual antiplatelet therapy, is on high-dose Crestor with recent LDL of 44.    Patient denies any shortness of breath, chest discomfort and feels a bit more energy than he did prior to his percutaneous intervention.  Pain to the whole inside of their garage over the weekend without any difficulties.  No orthopnea, paroxysmal nocturnal dyspnea, peripheral edema, syncope or near syncopal  "episodes.  Abnormal ejection fraction by echo when in the hospital without wall motion normalities.       Physical Examination Review of Systems   /82 (BP Location: Right arm, Patient Position: Sitting, Cuff Size: Adult Regular)   Pulse 88   Resp 16   Ht 1.803 m (5' 11\")   Wt 96.4 kg (212 lb 8 oz)   BMI 29.64 kg/m    Body mass index is 29.64 kg/m .  Wt Readings from Last 3 Encounters:   10/27/22 96.4 kg (212 lb 8 oz)   09/26/22 97.5 kg (215 lb)   09/15/22 98 kg (216 lb)     General Appearance:   Alert, cooperative and in no acute distress.   ENT/Mouth: Patient wearing a mask.      EYES:  no scleral icterus, normal conjunctivae   Neck: JVP normal. No Hepatojugular reflux. Thyroid not visualized.   Chest/Lungs:   Lungs are clear to auscultation, equal chest wall expansion.   Cardiovascular:   S1, S2 without murmur ,clicks or rubs. Brachial, radial and posterior tibial pulses are intact and symetric. No carotid bruits noted   Abdomen:  Nontender. BS+. No bruits.   Extremities: No cyanosis, clubbing or edema   Skin: no xanthelasma, warm.    Neurologic: normal arm movement bilateral, no tremors     Psychiatric: Appropriate affect.      Enc Vitals  BP: 106/82  Pulse: 88  Resp: 16  Weight: 96.4 kg (212 lb 8 oz)  Height: 180.3 cm (5' 11\")                                           Medical History  Surgical History Family History Social History   No past medical history on file. Past Surgical History:   Procedure Laterality Date     CV CORONARY ANGIOGRAM N/A 8/24/2022    Procedure: CV CORONARY ANGIOGRAM;  Surgeon: Alize Ortega MD;  Location: [facility] CV     CV LEFT HEART CATH N/A 8/24/2022    Procedure: Left Heart Catheterization;  Surgeon: Alize Ortega MD;  Location: [facility] CV     CV PCI STENT DRUG ELUTING N/A 8/24/2022    Procedure: Percutaneous Coronary Intervention Stent;  Surgeon: Alize Ortega MD;  Location: [facility] CV     ZZC APPENDECTOMY      Description: " "Appendectomy;  Recorded: 02/19/2010;    No family history on file. Social History     Socioeconomic History     Marital status:      Spouse name: Not on file     Number of children: Not on file     Years of education: Not on file     Highest education level: Not on file   Occupational History     Not on file   Tobacco Use     Smoking status: Never     Smokeless tobacco: Never   Vaping Use     Vaping Use: Never used   Substance and Sexual Activity     Alcohol use: Not on file     Drug use: Not on file     Sexual activity: Not on file   Other Topics Concern     Not on file   Social History Narrative     Not on file     Social Determinants of Health     Financial Resource Strain: Not on file   Food Insecurity: Not on file   Transportation Needs: Not on file   Physical Activity: Not on file   Stress: Not on file   Social Connections: Not on file   Intimate Partner Violence: Not on file   Housing Stability: Not on file          Medications  Allergies   Current Outpatient Medications   Medication Sig Dispense Refill     aspirin (ASA) 81 MG chewable tablet Take 1 tablet (81 mg) by mouth daily Starting tomorrow. 30 tablet 3     famotidine (PEPCID) 40 MG tablet Take 40 mg by mouth as needed       fish oil-omega-3 fatty acids 1000 MG capsule Take 1 g by mouth 2 times daily (with meals) Take after lunch and dinner.       losartan (COZAAR) 25 MG tablet Take 1 tablet (25 mg) by mouth every morning 90 tablet 3     Misc Natural Products (OSTEO BI-FLEX JOINT SHIELD) TABS Take 1 tablet by mouth 2 times daily (with meals) Take after lunch and dinner.       nitroGLYcerin (NITROSTAT) 0.4 MG sublingual tablet One tablet under the tongue every 5 minutes if needed for chest pain. May repeat every 5 minutes for a maximum of 3 doses in 15 minutes\" 25 tablet 3     rosuvastatin (CRESTOR) 40 MG tablet Take 1 tablet (40 mg) by mouth daily 90 tablet 3     ticagrelor (BRILINTA) 90 MG tablet Take 1 tablet (90 mg) by mouth 2 times daily Dose " to start tomorrow morning. 180 tablet 3     vitamin D3 (CHOLECALCIFEROL) 50 mcg (2000 units) tablet Take 50 mcg by mouth every morning      Allergies   Allergen Reactions     Cephalexin Hives and Rash         Lab Results    Chemistry/lipid CBC Cardiac Enzymes/BNP/TSH/INR   Lab Results   Component Value Date    CHOL 109 09/26/2022    HDL 42 09/26/2022    TRIG 115 09/26/2022    BUN 15.0 09/26/2022     09/26/2022    CO2 29 09/26/2022    Lab Results   Component Value Date    WBC 7.6 08/23/2022    HGB 14.8 08/25/2022    HCT 43.3 08/23/2022    MCV 86 08/23/2022     08/25/2022    Lab Results   Component Value Date    TROPONINI 0.40 (HH) 08/24/2022    BNP 38 08/21/2022              Thank you for allowing me to participate in the care of your patient.      Sincerely,     Justice Rizvi MD     [facility]  cc:   Adina Daigle NP  [address], [address]  [place] [address]

## 2022-10-27 NOTE — PROGRESS NOTES
[facility]  [phone]      Assessment/Recommendations   Patient with evaluation for chest discomfort, ultimately with coronary angiography and a stent placed in the circumflex system.  He has been stable since that time and has had no recurrent chest pressure.  He has been going through cardiac rehab with good vital signs and no new symptomatology.  He has some exercise equipment at home that he will transition to.    His LDL cholesterol is excellent, his blood pressure is at goal, and he is on dual antiplatelet agents.  Would recommend he continue dual antiplatelet agents for 1 year after his procedure which would be August 2023.    We will plan on seeing him back in 1 year with Conner and in 2 years with me.    He will call if he has any changes in his functional capacity or new symptomatology.    Thank you for allowing us to participate in his care.       History of Present Illness/Subjective    Mr. Jose Raul Larsen is a 57 year old male with known coronary artery disease with presentation with chest discomfort and August of this year.  He also had an incidental finding of a cavitary lesion in his lung on the CT scan and has been seen by infectious disease and tuberculosis has been ruled out.  This will be followed.    he had percutaneous intervention in the circumflex system by Dr. Schmidt.  He is on dual antiplatelet therapy, is on high-dose Crestor with recent LDL of 44.    Patient denies any shortness of breath, chest discomfort and feels a bit more energy than he did prior to his percutaneous intervention.  Pain to the whole inside of their garage over the weekend without any difficulties.  No orthopnea, paroxysmal nocturnal dyspnea, peripheral edema, syncope or near syncopal episodes.  Abnormal ejection fraction by echo when in the hospital without wall motion normalities.       Physical Examination Review of Systems   /82 (BP Location: Right arm, Patient Position:  "Sitting, Cuff Size: Adult Regular)   Pulse 88   Resp 16   Ht 1.803 m (5' 11\")   Wt 96.4 kg (212 lb 8 oz)   BMI 29.64 kg/m    Body mass index is 29.64 kg/m .  Wt Readings from Last 3 Encounters:   10/27/22 96.4 kg (212 lb 8 oz)   09/26/22 97.5 kg (215 lb)   09/15/22 98 kg (216 lb)     General Appearance:   Alert, cooperative and in no acute distress.   ENT/Mouth: Patient wearing a mask.      EYES:  no scleral icterus, normal conjunctivae   Neck: JVP normal. No Hepatojugular reflux. Thyroid not visualized.   Chest/Lungs:   Lungs are clear to auscultation, equal chest wall expansion.   Cardiovascular:   S1, S2 without murmur ,clicks or rubs. Brachial, radial and posterior tibial pulses are intact and symetric. No carotid bruits noted   Abdomen:  Nontender. BS+. No bruits.   Extremities: No cyanosis, clubbing or edema   Skin: no xanthelasma, warm.    Neurologic: normal arm movement bilateral, no tremors     Psychiatric: Appropriate affect.      Enc Vitals  BP: 106/82  Pulse: 88  Resp: 16  Weight: 96.4 kg (212 lb 8 oz)  Height: 180.3 cm (5' 11\")                                           Medical History  Surgical History Family History Social History   No past medical history on file. Past Surgical History:   Procedure Laterality Date     CV CORONARY ANGIOGRAM N/A 8/24/2022    Procedure: CV CORONARY ANGIOGRAM;  Surgeon: Alize Ortega MD;  Location: [facility]     CV LEFT HEART CATH N/A 8/24/2022    Procedure: Left Heart Catheterization;  Surgeon: Alize Ortega MD;  Location: [facility]     CV PCI STENT DRUG ELUTING N/A 8/24/2022    Procedure: Percutaneous Coronary Intervention Stent;  Surgeon: Alize Ortega MD;  Location: [facility] APPENDECTOMY      Description: Appendectomy;  Recorded: 02/19/2010;    No family history on file. Social History     Socioeconomic History     Marital status:      Spouse name: Not on file     Number of children: Not on file     Years " "of education: Not on file     Highest education level: Not on file   Occupational History     Not on file   Tobacco Use     Smoking status: Never     Smokeless tobacco: Never   Vaping Use     Vaping Use: Never used   Substance and Sexual Activity     Alcohol use: Not on file     Drug use: Not on file     Sexual activity: Not on file   Other Topics Concern     Not on file   Social History Narrative     Not on file     Social Determinants of Health     Financial Resource Strain: Not on file   Food Insecurity: Not on file   Transportation Needs: Not on file   Physical Activity: Not on file   Stress: Not on file   Social Connections: Not on file   Intimate Partner Violence: Not on file   Housing Stability: Not on file          Medications  Allergies   Current Outpatient Medications   Medication Sig Dispense Refill     aspirin (ASA) 81 MG chewable tablet Take 1 tablet (81 mg) by mouth daily Starting tomorrow. 30 tablet 3     famotidine (PEPCID) 40 MG tablet Take 40 mg by mouth as needed       fish oil-omega-3 fatty acids 1000 MG capsule Take 1 g by mouth 2 times daily (with meals) Take after lunch and dinner.       losartan (COZAAR) 25 MG tablet Take 1 tablet (25 mg) by mouth every morning 90 tablet 3     Misc Natural Products (OSTEO BI-FLEX JOINT SHIELD) TABS Take 1 tablet by mouth 2 times daily (with meals) Take after lunch and dinner.       nitroGLYcerin (NITROSTAT) 0.4 MG sublingual tablet One tablet under the tongue every 5 minutes if needed for chest pain. May repeat every 5 minutes for a maximum of 3 doses in 15 minutes\" 25 tablet 3     rosuvastatin (CRESTOR) 40 MG tablet Take 1 tablet (40 mg) by mouth daily 90 tablet 3     ticagrelor (BRILINTA) 90 MG tablet Take 1 tablet (90 mg) by mouth 2 times daily Dose to start tomorrow morning. 180 tablet 3     vitamin D3 (CHOLECALCIFEROL) 50 mcg (2000 units) tablet Take 50 mcg by mouth every morning      Allergies   Allergen Reactions     Cephalexin Hives and Rash         " Lab Results    Chemistry/lipid CBC Cardiac Enzymes/BNP/TSH/INR   Lab Results   Component Value Date    CHOL 109 09/26/2022    HDL 42 09/26/2022    TRIG 115 09/26/2022    BUN 15.0 09/26/2022     09/26/2022    CO2 29 09/26/2022    Lab Results   Component Value Date    WBC 7.6 08/23/2022    HGB 14.8 08/25/2022    HCT 43.3 08/23/2022    MCV 86 08/23/2022     08/25/2022    Lab Results   Component Value Date    TROPONINI 0.40 (HH) 08/24/2022    BNP 38 08/21/2022

## 2022-11-01 ENCOUNTER — HOSPITAL ENCOUNTER (OUTPATIENT)
Dept: CARDIAC REHAB | Facility: CLINIC | Age: 57
Discharge: HOME | End: 2022-11-01
Attending: INTERNAL MEDICINE
Payer: COMMERCIAL

## 2022-11-01 PROCEDURE — 93798 PHYS/QHP OP CAR RHAB W/ECG: CPT

## 2022-11-03 ENCOUNTER — HOSPITAL ENCOUNTER (OUTPATIENT)
Dept: CARDIAC REHAB | Facility: CLINIC | Age: 57
Discharge: HOME | End: 2022-11-03
Attending: INTERNAL MEDICINE
Payer: COMMERCIAL

## 2022-11-03 PROCEDURE — 93798 PHYS/QHP OP CAR RHAB W/ECG: CPT

## 2022-11-08 ENCOUNTER — HOSPITAL ENCOUNTER (OUTPATIENT)
Dept: CARDIAC REHAB | Facility: CLINIC | Age: 57
Discharge: HOME | End: 2022-11-08
Attending: INTERNAL MEDICINE
Payer: COMMERCIAL

## 2022-11-08 PROCEDURE — 93798 PHYS/QHP OP CAR RHAB W/ECG: CPT

## 2022-11-15 ENCOUNTER — HOSPITAL ENCOUNTER (OUTPATIENT)
Dept: CARDIAC REHAB | Facility: CLINIC | Age: 57
Discharge: HOME | End: 2022-11-15
Attending: INTERNAL MEDICINE
Payer: COMMERCIAL

## 2022-11-15 PROCEDURE — 93798 PHYS/QHP OP CAR RHAB W/ECG: CPT

## 2022-11-16 DIAGNOSIS — B99.9 INFECTION: Primary | ICD-10-CM

## 2022-11-17 ENCOUNTER — HOSPITAL ENCOUNTER (OUTPATIENT)
Dept: CARDIAC REHAB | Facility: CLINIC | Age: 57
Discharge: HOME | End: 2022-11-17
Attending: INTERNAL MEDICINE
Payer: COMMERCIAL

## 2022-11-17 PROCEDURE — 93798 PHYS/QHP OP CAR RHAB W/ECG: CPT

## 2022-11-22 ENCOUNTER — HOSPITAL ENCOUNTER (OUTPATIENT)
Dept: CARDIAC REHAB | Facility: CLINIC | Age: 57
Discharge: HOME | End: 2022-11-22
Attending: INTERNAL MEDICINE
Payer: COMMERCIAL

## 2022-11-22 PROCEDURE — 93798 PHYS/QHP OP CAR RHAB W/ECG: CPT

## 2022-11-29 ENCOUNTER — HOSPITAL ENCOUNTER (OUTPATIENT)
Dept: CARDIAC REHAB | Facility: CLINIC | Age: 57
Discharge: HOME | End: 2022-11-29
Attending: INTERNAL MEDICINE
Payer: COMMERCIAL

## 2022-11-29 PROCEDURE — 93798 PHYS/QHP OP CAR RHAB W/ECG: CPT

## 2022-11-29 PROCEDURE — 93797 PHYS/QHP OP CAR RHAB WO ECG: CPT

## 2022-12-08 ENCOUNTER — TELEPHONE (OUTPATIENT)
Dept: PULMONOLOGY | Facility: CLINIC | Age: 57
End: 2022-12-08

## 2022-12-30 ENCOUNTER — TELEPHONE (OUTPATIENT)
Dept: INTERNAL MEDICINE | Facility: CLINIC | Age: 57
End: 2022-12-30

## 2022-12-30 ENCOUNTER — OFFICE VISIT (OUTPATIENT)
Dept: INTERNAL MEDICINE | Facility: CLINIC | Age: 57
End: 2022-12-30
Payer: COMMERCIAL

## 2022-12-30 VITALS
HEIGHT: 71 IN | TEMPERATURE: 98.5 F | WEIGHT: 215 LBS | SYSTOLIC BLOOD PRESSURE: 118 MMHG | BODY MASS INDEX: 30.1 KG/M2 | HEART RATE: 67 BPM | OXYGEN SATURATION: 100 % | DIASTOLIC BLOOD PRESSURE: 80 MMHG

## 2022-12-30 DIAGNOSIS — I10 ESSENTIAL HYPERTENSION: ICD-10-CM

## 2022-12-30 DIAGNOSIS — I25.84 CORONARY ARTERY DISEASE DUE TO CALCIFIED CORONARY LESION: Primary | ICD-10-CM

## 2022-12-30 DIAGNOSIS — Z51.81 ENCOUNTER FOR THERAPEUTIC DRUG MONITORING: ICD-10-CM

## 2022-12-30 DIAGNOSIS — R93.89 ABNORMAL CT OF THE CHEST: ICD-10-CM

## 2022-12-30 DIAGNOSIS — Z23 NEED FOR COVID-19 VACCINE: ICD-10-CM

## 2022-12-30 DIAGNOSIS — I25.10 CORONARY ARTERY DISEASE DUE TO CALCIFIED CORONARY LESION: Primary | ICD-10-CM

## 2022-12-30 DIAGNOSIS — R79.89 ELEVATED LIVER FUNCTION TESTS: Primary | ICD-10-CM

## 2022-12-30 DIAGNOSIS — Z12.5 SCREENING FOR PROSTATE CANCER: ICD-10-CM

## 2022-12-30 DIAGNOSIS — M25.511 STERNOCLAVICULAR JOINT PAIN, RIGHT: ICD-10-CM

## 2022-12-30 LAB
ALBUMIN SERPL BCG-MCNC: 4.4 G/DL (ref 3.5–5.2)
ALP SERPL-CCNC: 52 U/L (ref 40–129)
ALT SERPL W P-5'-P-CCNC: 78 U/L (ref 10–50)
ANION GAP SERPL CALCULATED.3IONS-SCNC: 9 MMOL/L (ref 7–15)
AST SERPL W P-5'-P-CCNC: 49 U/L (ref 10–50)
BILIRUB SERPL-MCNC: 2.2 MG/DL
BUN SERPL-MCNC: 16.5 MG/DL (ref 6–20)
CALCIUM SERPL-MCNC: 9.8 MG/DL (ref 8.6–10)
CHLORIDE SERPL-SCNC: 104 MMOL/L (ref 98–107)
CHOLEST SERPL-MCNC: 106 MG/DL
CK SERPL-CCNC: 148 U/L (ref 39–308)
CREAT SERPL-MCNC: 1.15 MG/DL (ref 0.67–1.17)
DEPRECATED HCO3 PLAS-SCNC: 26 MMOL/L (ref 22–29)
GFR SERPL CREATININE-BSD FRML MDRD: 74 ML/MIN/1.73M2
GLUCOSE SERPL-MCNC: 91 MG/DL (ref 70–99)
HDLC SERPL-MCNC: 39 MG/DL
LDLC SERPL CALC-MCNC: 45 MG/DL
NONHDLC SERPL-MCNC: 67 MG/DL
POTASSIUM SERPL-SCNC: 4.3 MMOL/L (ref 3.4–5.3)
PROT SERPL-MCNC: 7.2 G/DL (ref 6.4–8.3)
PSA SERPL-MCNC: 2.72 NG/ML (ref 0–3.5)
SODIUM SERPL-SCNC: 139 MMOL/L (ref 136–145)
TRIGL SERPL-MCNC: 111 MG/DL

## 2022-12-30 PROCEDURE — 36415 COLL VENOUS BLD VENIPUNCTURE: CPT | Performed by: INTERNAL MEDICINE

## 2022-12-30 PROCEDURE — 82550 ASSAY OF CK (CPK): CPT | Performed by: INTERNAL MEDICINE

## 2022-12-30 PROCEDURE — G0103 PSA SCREENING: HCPCS | Performed by: INTERNAL MEDICINE

## 2022-12-30 PROCEDURE — 99214 OFFICE O/P EST MOD 30 MIN: CPT | Performed by: INTERNAL MEDICINE

## 2022-12-30 PROCEDURE — 80053 COMPREHEN METABOLIC PANEL: CPT | Performed by: INTERNAL MEDICINE

## 2022-12-30 PROCEDURE — 80061 LIPID PANEL: CPT | Performed by: INTERNAL MEDICINE

## 2022-12-30 PROCEDURE — 0124A COVID-19 VACCINE BIVALENT BOOSTER 12+ (PFIZER): CPT | Performed by: INTERNAL MEDICINE

## 2022-12-30 PROCEDURE — 91312 COVID-19 VACCINE BIVALENT BOOSTER 12+ (PFIZER): CPT | Performed by: INTERNAL MEDICINE

## 2022-12-30 NOTE — PROGRESS NOTES
Jose Raul Larsen   57 year old male    Date of Visit: 12/30/2022    Chief Complaint   Patient presents with     Follow Up     3 month check - fasting - clavicle on right side swollen, noticed ~2 months     Subjective  57-year-old male had a non-Q wave MI August 2022, concurrent with a COVID infection.    Drug-eluting stent to the circumflex place.  Moderate disease of the RCA and LAD.  He finished cardiac rehab.  He was doing 8.9 METS on the elliptical earlier this month.  He still doing exercise at home on his elliptical and rowing machine with good exercise tolerance without chest pain or shortness of breath.    No bleeding issues on the aspirin and Brilinta.    Rosuvastatin 40 mg without generalized myalgias.  No history of liver problems.  September 2022 LDL was 44 with normal liver test and CK.    Hypertension has been well controlled with blood pressure around 120/80.  No orthostasis.  Losartan 25 mg nightly.    He is never smoked.    He denies any cough shortness of breath or fever.  No hemoptysis.    He had an incidental cavitary right upper lobe lesion noted during his hospitalization in August.  Repeat CT scan of the chest 1 month later in September was stable.  AFB was negative and fungal was negative.  Including fungal serologies and quant interferon gold TB test.  Patient reported that he had severe pneumonia in 2020 which may have been the cause.    Patient reports that he did have a colonoscopy 2 years ago he believes in [place], he reports that was negative and they told him a 10-year follow-up.    He denies any urinary symptoms.    He also has a complaint of right sternoclavicular joint swelling that occurred a few weeks ago became red and swollen.  It just lasted a few days, swelling has significantly reduced and there is no pain.  But there is still some hypertrophy in the right sternoclavicular joint compared to left.  Patient reports that he had had a spell a number of months ago as well.  " He has not had any issues at other joints.  No history of inflammatory arthritis.  No rash.    PMHx:  No past medical history on file.  PSHx:    Past Surgical History:   Procedure Laterality Date     CV CORONARY ANGIOGRAM N/A 8/24/2022    Procedure: CV CORONARY ANGIOGRAM;  Surgeon: Alize Ortega MD;  Location: [facility] CV     CV LEFT HEART CATH N/A 8/24/2022    Procedure: Left Heart Catheterization;  Surgeon: Alize Ortega MD;  Location: [facility]     CV PCI STENT DRUG ELUTING N/A 8/24/2022    Procedure: Percutaneous Coronary Intervention Stent;  Surgeon: Ailze Ortega MD;  Location: [facility]     ZZC APPENDECTOMY      Description: Appendectomy;  Recorded: 02/19/2010;     Immunizations:   Immunization History   Administered Date(s) Administered     COVID-19 Vaccine 12+ (Pfizer) 03/08/2021, 03/31/2021, 12/30/2021     DT (PEDS <7y) 07/30/2001     FLU 6-35 months 09/23/2011     Influenza Vaccine >6 months (Alfuria,Fluzone) 12/02/2022     Influenza Vaccine, 6+MO IM (QUADRIVALENT W/PRESERVATIVES) 09/28/2012     Td,adult,historic,unspecified 02/19/2010     Tdap (Adacel,Boostrix) 02/19/2010       ROS A comprehensive review of systems was performed and was otherwise negative    Medications, allergies, and problem list were reviewed and updated    Exam  /80 (BP Location: Right arm, Patient Position: Sitting, Cuff Size: Adult Regular)   Pulse 67   Temp 98.5  F (36.9  C)   Ht 1.803 m (5' 11\")   Wt 97.5 kg (215 lb)   SpO2 100%   BMI 29.99 kg/m    He does have some mild hypertrophy of the right sternoclavicular joint compared to the left but there is no active inflammation tenderness or erythema.  Still good range of motion of the shoulder.  His lungs are clear.  No adenopathy.  Heart is regular without murmur.  Abdomen is nontender nonobese no hepatomegaly.  No ankle edema    Assessment/Plan  1. Coronary artery disease due to calcified coronary lesion  Asymptomatic with good " exercise tolerance.  Continue current medical management with aspirin and Brilinta.  Brilinta until August of next year.    Crestor 40 mg well-controlled cholesterol in September.    He saw cardiology in October, 1 year follow-up with cardiology  - Lipid Profile    2. Essential hypertension  Controlled on losartan 25 mg in the evening    3. Encounter for therapeutic drug monitoring    - Comprehensive metabolic panel  - CK total    4. Screening for prostate cancer  Routine screening  - Prostate Specific Antigen Screen    5. Need for COVID-19 vaccine  Given today, previous COVID in August.  He is already had the flu shot  - COVID-19 VACCINE BIVALENT BOOSTER 12+ (PFIZER)    6. Sternoclavicular joint pain, right  I suspect mechanical strain, possibly from the elliptical  cardiac rehab, inflammation is largely resolved with some residual hypertrophy of the joint.  Follow-up if this recurs or other joints affected    7. Abnormal CT of the chest  Likely old cavitary lesion for pneumonia in 2020.  There is no active infectious symptoms now or significant cough.  No further infectious disease follow-up plan.  There was a plan to repeat chest CT scan.  I did review the last infectious disease note but timing of repeat CT scan was not specified.  Patient does prefer to do the CT scan at this time at the 3-month follow-up time.  Patient was warned on radiation exposure risk with CT scan.  - CT Chest w/o Contrast; Future    I did asked patient to get the colonoscopy report [place] that he reports was done 2 years ago, negative and 10-year follow-up.      Return in about 6 months (around 6/30/2023) for Routine preventive.   Patient Instructions   No change in medication treatment plan.    Please obtain the colonoscopy report that you had approximately 2 years ago, for our records.    Continue with regular physical activity as you are doing.    It appears you had some strain arthritis of the right  "sternoclavicular joint which has now largely resolved although it is still bigger than the other side.  If you continue to have problems with the joint, or if you have inflammations at another joint, get reevaluated.    Repeat your chest CT scan this winter.  Look on your [employer] computer portal for results.    Follow-up with me in 6 months for physical exam        Mio Gtz MD, MD        Current Outpatient Medications   Medication Sig Dispense Refill     aspirin (ASA) 81 MG chewable tablet Take 1 tablet (81 mg) by mouth daily Starting tomorrow. 30 tablet 3     famotidine (PEPCID) 40 MG tablet Take 40 mg by mouth as needed       fish oil-omega-3 fatty acids 1000 MG capsule Take 1 g by mouth 2 times daily (with meals) Take after lunch and dinner.       losartan (COZAAR) 25 MG tablet Take 1 tablet (25 mg) by mouth every morning 90 tablet 3     Misc Natural Products (OSTEO BI-FLEX JOINT SHIELD) TABS Take 1 tablet by mouth 2 times daily (with meals) Take after lunch and dinner.       nitroGLYcerin (NITROSTAT) 0.4 MG sublingual tablet One tablet under the tongue every 5 minutes if needed for chest pain. May repeat every 5 minutes for a maximum of 3 doses in 15 minutes\" 25 tablet 3     rosuvastatin (CRESTOR) 40 MG tablet Take 1 tablet (40 mg) by mouth daily 90 tablet 3     ticagrelor (BRILINTA) 90 MG tablet Take 1 tablet (90 mg) by mouth 2 times daily Dose to start tomorrow morning. 180 tablet 3     vitamin D3 (CHOLECALCIFEROL) 50 mcg (2000 units) tablet Take 50 mcg by mouth every morning       Allergies   Allergen Reactions     Cephalexin Hives and Rash     Social History     Tobacco Use     Smoking status: Never     Smokeless tobacco: Never   Vaping Use     Vaping Use: Never used             Ed Blunt is a 57 year old, presenting for the following health issues:  Follow Up (3 month check - fasting - clavicle on right side swollen, noticed ~2 months)      History of Present Illness       Vascular Disease: "  He presents for follow up of vascular disease.  He takes daily aspirin.    He eats 2-3 servings of fruits and vegetables daily.He consumes 0 sweetened beverage(s) daily.He exercises with enough effort to increase his heart rate 20 to 29 minutes per day.  He exercises with enough effort to increase his heart rate 7 days per week.   He is taking medications regularly.             Review of Systems         Objective    There were no vitals taken for this visit.  There is no height or weight on file to calculate BMI.  Physical Exam

## 2022-12-30 NOTE — TELEPHONE ENCOUNTER
"Left message to return call. Please relay provider's message and assist with scheduling.     Please relay below lab result notes from Dr Gtz to pt if pt have not see it via MyChart yet:    \"CK, muscle test, is normal.     Your ALT liver test was moderately elevated.  This may have been from recent holiday eating and fatty liver.  But with your rosuvastatin cholesterol medication I would want you to monitor your liver test closely.  Recheck your liver tests in 1 month, schedule a nonfasting lab-only visit.     Your prostate test is normal.     Kidney labs are normal.     Blood sugar/glucose was normal.     Bilirubin level is okay.     Excellent cholesterol levels.\"      MURRAY MenezesN, RN  [facility]      "

## 2022-12-30 NOTE — TELEPHONE ENCOUNTER
12-30-22  Pt called stated he was seeing today 12-30-22 & Dr hirsch wanted to know what pt's last colonoscopy was, per pt his last colonoscopy was 10-30-18 @ [facility] in [place].  marla

## 2022-12-30 NOTE — TELEPHONE ENCOUNTER
Entry patient sees his message on lab report in his [employer] portal.    Have him schedule a nonfasting lab appointment in approximately 1 month to recheck liver tests

## 2022-12-30 NOTE — PATIENT INSTRUCTIONS
No change in medication treatment plan.    Please obtain the colonoscopy report that you had approximately 2 years ago, for our records.    Continue with regular physical activity as you are doing.    It appears you had some strain arthritis of the right sternoclavicular joint which has now largely resolved although it is still bigger than the other side.  If you continue to have problems with the joint, or if you have inflammations at another joint, get reevaluated.    Repeat your chest CT scan this winter.  Look on your [employer] computer portal for results.    Follow-up with me in 6 months for physical exam

## 2023-02-02 ENCOUNTER — HOSPITAL ENCOUNTER (OUTPATIENT)
Dept: CT IMAGING | Facility: CLINIC | Age: 58
Discharge: HOME | End: 2023-02-02
Attending: INTERNAL MEDICINE | Admitting: INTERNAL MEDICINE
Payer: COMMERCIAL

## 2023-02-02 ENCOUNTER — LAB (OUTPATIENT)
Dept: LAB | Facility: CLINIC | Age: 58
End: 2023-02-02
Payer: COMMERCIAL

## 2023-02-02 DIAGNOSIS — R79.89 ELEVATED LIVER FUNCTION TESTS: ICD-10-CM

## 2023-02-02 DIAGNOSIS — R93.89 ABNORMAL CT OF THE CHEST: ICD-10-CM

## 2023-02-02 LAB
ALBUMIN SERPL BCG-MCNC: 4.4 G/DL (ref 3.5–5.2)
ALP SERPL-CCNC: 52 U/L (ref 40–129)
ALT SERPL W P-5'-P-CCNC: 67 U/L (ref 10–50)
AST SERPL W P-5'-P-CCNC: 43 U/L (ref 10–50)
BILIRUB DIRECT SERPL-MCNC: 0.25 MG/DL (ref 0–0.3)
BILIRUB SERPL-MCNC: 1.8 MG/DL
PROT SERPL-MCNC: 7.1 G/DL (ref 6.4–8.3)

## 2023-02-02 PROCEDURE — 71250 CT THORAX DX C-: CPT

## 2023-02-02 PROCEDURE — 80076 HEPATIC FUNCTION PANEL: CPT

## 2023-02-02 PROCEDURE — 36415 COLL VENOUS BLD VENIPUNCTURE: CPT

## 2023-03-15 ENCOUNTER — LAB (OUTPATIENT)
Dept: LAB | Facility: CLINIC | Age: 58
End: 2023-03-15
Payer: COMMERCIAL

## 2023-03-15 DIAGNOSIS — R79.89 ELEVATED LIVER FUNCTION TESTS: ICD-10-CM

## 2023-03-15 LAB
ALBUMIN SERPL BCG-MCNC: 4.6 G/DL (ref 3.5–5.2)
ALP SERPL-CCNC: 55 U/L (ref 40–129)
ALT SERPL W P-5'-P-CCNC: 94 U/L (ref 10–50)
AST SERPL W P-5'-P-CCNC: 68 U/L (ref 10–50)
BILIRUB DIRECT SERPL-MCNC: 0.22 MG/DL (ref 0–0.3)
BILIRUB SERPL-MCNC: 2.7 MG/DL
PROT SERPL-MCNC: 7.8 G/DL (ref 6.4–8.3)

## 2023-03-15 PROCEDURE — 36415 COLL VENOUS BLD VENIPUNCTURE: CPT

## 2023-03-15 PROCEDURE — 80076 HEPATIC FUNCTION PANEL: CPT

## 2023-03-17 ENCOUNTER — TELEPHONE (OUTPATIENT)
Dept: INTERNAL MEDICINE | Facility: CLINIC | Age: 58
End: 2023-03-17
Payer: COMMERCIAL

## 2023-03-17 NOTE — TELEPHONE ENCOUNTER
Liver chemistry tests of March 15, 2023 continue to show a mild elevation of AST and ALT, a little higher than was seen February 2, but not a severe elevation.    There is a mild elevation in bilirubin, with a level of 2.7 that could possibly produce a slight yellowish tint to the skin.    Since he reports feeling so badly the last few days, he really needs to be seen in clinic, and have additional tests.

## 2023-03-17 NOTE — TELEPHONE ENCOUNTER
I called patient to discuss - relayed message.     Pt reports he is improving in his symptoms. He feels like he had the flu.   Pt reports his main symptom is increased fatigue.   Denies fever, no pain. No nausea or vomiting.   Pt had diarrhea, but that has improved.   He denied any red flag symptoms.    Set up follow-up appointment to discuss with Dr. Gtz on Monday 3/20/23 at 2pm.  Advised new or worsening symptoms to be seen in UC over the weekend.    Alize Saldivar RN, BSN  [facility]

## 2023-03-17 NOTE — TELEPHONE ENCOUNTER
Nurse Triage SBAR    Is this a 2nd Level Triage? YES, LICENSED PRACTITIONER REVIEW IS REQUIRED    S-(situation): Patient had labs redrawn on 3/15/23.  Labs are elevated.    B-(background): Initial labs drawn 2/2/2023    A-(assessment):  Patient has been (really sick) for last couple days. States exhaustion and high fatigue is main symptom.  Yesterday had diarrhea.  Body Aches.  (no fever, no nausea, no URI symptoms).    Patient states that his wife shares that he has a (yellow tint to skin).  Patient states he does not see any yellow tint.    R-(recommendations):   Please review and advise      Routed to provider

## 2023-03-20 ENCOUNTER — OFFICE VISIT (OUTPATIENT)
Dept: INTERNAL MEDICINE | Facility: CLINIC | Age: 58
End: 2023-03-20
Payer: COMMERCIAL

## 2023-03-20 VITALS
BODY MASS INDEX: 29.96 KG/M2 | WEIGHT: 214 LBS | DIASTOLIC BLOOD PRESSURE: 78 MMHG | HEART RATE: 66 BPM | HEIGHT: 71 IN | TEMPERATURE: 98 F | OXYGEN SATURATION: 98 % | RESPIRATION RATE: 16 BRPM | SYSTOLIC BLOOD PRESSURE: 104 MMHG

## 2023-03-20 DIAGNOSIS — I10 ESSENTIAL HYPERTENSION: ICD-10-CM

## 2023-03-20 DIAGNOSIS — I25.84 CORONARY ARTERY DISEASE DUE TO CALCIFIED CORONARY LESION: ICD-10-CM

## 2023-03-20 DIAGNOSIS — J18.1 LEFT UPPER LOBE CONSOLIDATION (H): ICD-10-CM

## 2023-03-20 DIAGNOSIS — R79.89 ELEVATED LIVER FUNCTION TESTS: Primary | ICD-10-CM

## 2023-03-20 DIAGNOSIS — I25.10 CORONARY ARTERY DISEASE DUE TO CALCIFIED CORONARY LESION: ICD-10-CM

## 2023-03-20 LAB
ALBUMIN SERPL BCG-MCNC: 4.3 G/DL (ref 3.5–5.2)
ALP SERPL-CCNC: 49 U/L (ref 40–129)
ALT SERPL W P-5'-P-CCNC: 93 U/L (ref 10–50)
ANION GAP SERPL CALCULATED.3IONS-SCNC: 13 MMOL/L (ref 7–15)
AST SERPL W P-5'-P-CCNC: 65 U/L (ref 10–50)
BILIRUB SERPL-MCNC: 1.5 MG/DL
BUN SERPL-MCNC: 20.3 MG/DL (ref 6–20)
CALCIUM SERPL-MCNC: 9.9 MG/DL (ref 8.6–10)
CHLORIDE SERPL-SCNC: 101 MMOL/L (ref 98–107)
CREAT SERPL-MCNC: 1.23 MG/DL (ref 0.67–1.17)
DEPRECATED HCO3 PLAS-SCNC: 24 MMOL/L (ref 22–29)
GFR SERPL CREATININE-BSD FRML MDRD: 68 ML/MIN/1.73M2
GLUCOSE SERPL-MCNC: 89 MG/DL (ref 70–99)
POTASSIUM SERPL-SCNC: 4.4 MMOL/L (ref 3.4–5.3)
PROT SERPL-MCNC: 7.3 G/DL (ref 6.4–8.3)
SODIUM SERPL-SCNC: 138 MMOL/L (ref 136–145)

## 2023-03-20 PROCEDURE — 80053 COMPREHEN METABOLIC PANEL: CPT | Performed by: INTERNAL MEDICINE

## 2023-03-20 PROCEDURE — 36415 COLL VENOUS BLD VENIPUNCTURE: CPT | Performed by: INTERNAL MEDICINE

## 2023-03-20 PROCEDURE — 80074 ACUTE HEPATITIS PANEL: CPT | Performed by: INTERNAL MEDICINE

## 2023-03-20 PROCEDURE — 99214 OFFICE O/P EST MOD 30 MIN: CPT | Performed by: INTERNAL MEDICINE

## 2023-03-20 ASSESSMENT — ENCOUNTER SYMPTOMS: FATIGUE: 1

## 2023-03-20 NOTE — PROGRESS NOTES
Jose Raul Larsen   58 year old male    Date of Visit: 3/20/2023    Chief Complaint   Patient presents with     Fatigue     Body aches, SOB w exertion, dizziness, not much appetite, fever/chills. Starting to feel better. Symptoms began 3/14.     Urinary Problem     Urinary urgency     Subjective  58-year-old male who had an acute MI while having acute COVID August 2022.  He had a drug-eluting stent to the circumflex.  He has moderate disease of the RCA and LAD as well.    He completed cardiac rehab and is doing the home elliptical machine and quite active without symptoms.    He is on rosuvastatin 40 mg in addition to aspirin and Brilinta.    He takes losartan 25 mg a day for hypertension.    Blood pressure is usually been running 120/70 although recently mildly low down to 101/60 but denies orthostasis.    In February he had mild elevation of ALT, suggestive of fatty liver.  He has been abstaining from beer and trying to improve diet.    Patient became acutely ill 6 days ago with fatigue and achiness diffusely.  He had low-grade fever of 100.0.  Did not have significant cough or URI symptoms.  No GI symptoms or rash.  His symptoms have significantly improved over the past 2 to 3 days.  No further fever.    No abdominal pain or jaundice.  No nausea.    Patient did have a right upper lobe cavitary lesion last year, but negative for AFB and negative Gold QuantiFERON TB test.  Negative fungal.  He had a severe pneumonia back in 2020 and he suspects that may have been what it is from.    He did have a follow-up CT scan of the chest February 2 of this year with evolution of the left upper lobe cavity with improved granulomatous infection signs and no new nodules.  He denies any new cough or shortness of breath.  The upper abdomen was described as normal on that CT scan.    March 15 AST 68 and ALT 96.    PMHx:  No past medical history on file.  PSHx:    Past Surgical History:   Procedure Laterality Date     CV CORONARY  "ANGIOGRAM N/A 8/24/2022    Procedure: CV CORONARY ANGIOGRAM;  Surgeon: Alize Ortega MD;  Location: [facility] CV     CV LEFT HEART CATH N/A 8/24/2022    Procedure: Left Heart Catheterization;  Surgeon: Alize Ortega MD;  Location: [facility]     CV PCI STENT DRUG ELUTING N/A 8/24/2022    Procedure: Percutaneous Coronary Intervention Stent;  Surgeon: Alize Ortega MD;  Location: [facility] CV     ZZC APPENDECTOMY      Description: Appendectomy;  Recorded: 02/19/2010;     Immunizations:   Immunization History   Administered Date(s) Administered     COVID-19 Vaccine 12+ (Pfizer) 03/08/2021, 03/31/2021, 12/30/2021     COVID-19 Vaccine Bivalent Booster 12+ (Pfizer) 12/30/2022     DT (PEDS <7y) 07/30/2001     FLU 6-35 months 09/23/2011     Influenza Vaccine >6 months (Alfuria,Fluzone) 12/02/2022     Influenza Vaccine, 6+MO IM (QUADRIVALENT W/PRESERVATIVES) 09/28/2012     Td,adult,historic,unspecified 02/19/2010     Tdap (Adacel,Boostrix) 02/19/2010       ROS A comprehensive review of systems was performed and was otherwise negative    Medications, allergies, and problem list were reviewed and updated    Exam  /78   Pulse 66   Temp 98  F (36.7  C) (Oral)   Resp 16   Ht 1.803 m (5' 11\")   Wt 97.1 kg (214 lb)   SpO2 98%   BMI 29.85 kg/m    No jaundice.  No conjunctivitis.  No pharyngitis.  No cervical adenopathy.  Lungs are clear to auscultation.  No crackles or wheezing.  Heart is regular without murmur rub or gallop.  Abdomen is nontender.  There is no right upper quadrant tenderness, liver edge feels normal.  No ankle edema.  Skin is normal.  Gait is normal.    Assessment/Plan  1. Elevated liver function tests  I suspect some mild fatty liver that he had had over the past few months, since getting COVID last month.  Possibility for mild statin toxicity, but high indication for statin with recent MI.    Recent elevation of liver tests may have been associated with a viral " infection, which is now symptomatically improving.    Recheck labs today including viral hepatitis panel.  Evaluate further with liver ultrasound.  - Comprehensive metabolic panel  - Acute hepatitis panel  - US Abdomen Limited; Future    See patient instructions for holding rosuvastatin, discussed with patient today.  We will be rechecking liver test, schedule to be determined based on today's results.  Patient will monitor his [employer] portal for further instructions on rechecking labs.    2. Coronary artery disease due to calcified coronary lesion  Asymptomatic.  Good exercise tolerance.  On aspirin and Brilinta.  Crestor plan as above.    3. Essential hypertension  Has been well controlled on losartan 25 mg a day, borderline low blood pressure today.  Patient was told to monitor for blood pressures that would be too low.    4. Left upper lobe consolidation (H)  Consistent with previous granulomatous infection that is now improved.  No new pulmonary infection symptoms at this time.  Chest CT scan was improving last month.  Consider repeat chest CT scan in 1 year to document stability.    He reported a normal colonoscopy October 2018 with a 10-year follow-up plan.  I was done in [place].    Viral syndrome with fatigue and achiness and low-grade fever last week, now better.  He tested COVID-negative.  He has had a flu shot.  Patient was told to follow-up for further evaluation if he has relapse or continued recurrent infectious type symptoms.      Return in 4 months (on 7/7/2023) for Routine preventive.   Patient Instructions   Do not take rosuvastatin tonight.  Look at your [employer] computer portal for results of the lab work.  If the liver tests are improving, you can restart your rosuvastatin.    I will have you recheck your liver tests until you are sufficiently improved.    Continue to avoid alcohol and simple carbohydrates and starchy foods.  Avoid beer.    Schedule a liver ultrasound for further evaluation  "of your elevated liver tests.    I suspect you had a viral infection of some type that may have worsened your liver test last week.  Your viral-type symptoms are now improving and should completely resolve over the next week.  If you continue to have symptoms of severe fatigue or achiness, recurrent fever, follow-up in clinic for further evaluation.    Monitor blood pressure.  If blood pressure is running less than 110/60, you may need to not take your losartan.  Goal blood pressure less than 135/85, but not less than 110/60.            Mio Gtz MD, MD        Current Outpatient Medications   Medication Sig Dispense Refill     aspirin (ASA) 81 MG chewable tablet Take 1 tablet (81 mg) by mouth daily Starting tomorrow. 30 tablet 3     famotidine (PEPCID) 40 MG tablet Take 40 mg by mouth as needed       fish oil-omega-3 fatty acids 1000 MG capsule Take 1 g by mouth 2 times daily (with meals) Take after lunch and dinner.       losartan (COZAAR) 25 MG tablet Take 1 tablet (25 mg) by mouth every morning 90 tablet 3     rosuvastatin (CRESTOR) 40 MG tablet Take 1 tablet (40 mg) by mouth daily 90 tablet 3     ticagrelor (BRILINTA) 90 MG tablet Take 1 tablet (90 mg) by mouth 2 times daily Dose to start tomorrow morning. 180 tablet 3     vitamin D3 (CHOLECALCIFEROL) 50 mcg (2000 units) tablet Take 50 mcg by mouth every morning       Misc Natural Products (OSTEO BI-FLEX JOINT SHIELD) TABS Take 1 tablet by mouth 2 times daily (with meals) Take after lunch and dinner. (Patient not taking: Reported on 3/20/2023)       nitroGLYcerin (NITROSTAT) 0.4 MG sublingual tablet One tablet under the tongue every 5 minutes if needed for chest pain. May repeat every 5 minutes for a maximum of 3 doses in 15 minutes\" (Patient not taking: Reported on 3/20/2023) 25 tablet 3     Allergies   Allergen Reactions     Cephalexin Hives and Rash     Social History     Tobacco Use     Smoking status: Never     Smokeless tobacco: Never   Vaping Use     " "Vaping Use: Never used             Subjective   Jose Raul is a 58 year old, presenting for the following health issues:  Fatigue (Body aches, SOB w exertion, dizziness, not much appetite, fever/chills. Starting to feel better. Symptoms began 3/14.) and Urinary Problem (Urinary urgency)      Fatigue  Associated symptoms include fatigue.   History of Present Illness       Reason for visit:  Have been feeling ill since about 3/13.  Symptom onset:  3-7 days ago  Symptoms include:  Fatigue, body aches, chills, fevor.  Symptom intensity:  Moderate  Symptom progression:  Improving  Had these symptoms before:  No  What makes it worse:  No  What makes it better:  Not really    He eats 2-3 servings of fruits and vegetables daily.He consumes 0 sweetened beverage(s) daily.He exercises with enough effort to increase his heart rate 30 to 60 minutes per day.  He exercises with enough effort to increase his heart rate 7 days per week.   He is taking medications regularly.             Review of Systems   Constitutional: Positive for fatigue.            Objective    /78   Pulse 66   Temp 98  F (36.7  C) (Oral)   Resp 16   Ht 1.803 m (5' 11\")   Wt 97.1 kg (214 lb)   SpO2 98%   BMI 29.85 kg/m    Body mass index is 29.85 kg/m .  Physical Exam                       "

## 2023-03-20 NOTE — PATIENT INSTRUCTIONS
Do not take rosuvastatin tonight.  Look at your [employer] computer portal for results of the lab work.  If the liver tests are improving, you can restart your rosuvastatin.    I will have you recheck your liver tests until you are sufficiently improved.    Continue to avoid alcohol and simple carbohydrates and starchy foods.  Avoid beer.    Schedule a liver ultrasound for further evaluation of your elevated liver tests.    I suspect you had a viral infection of some type that may have worsened your liver test last week.  Your viral-type symptoms are now improving and should completely resolve over the next week.  If you continue to have symptoms of severe fatigue or achiness, recurrent fever, follow-up in clinic for further evaluation.    Monitor blood pressure.  If blood pressure is running less than 110/60, you may need to not take your losartan.  Goal blood pressure less than 135/85, but not less than 110/60.

## 2023-03-21 ENCOUNTER — TELEPHONE (OUTPATIENT)
Dept: INTERNAL MEDICINE | Facility: CLINIC | Age: 58
End: 2023-03-21
Payer: COMMERCIAL

## 2023-03-21 DIAGNOSIS — Z51.81 ENCOUNTER FOR THERAPEUTIC DRUG MONITORING: Primary | ICD-10-CM

## 2023-03-21 LAB
HAV IGM SERPL QL IA: NONREACTIVE
HBV CORE IGM SERPL QL IA: NONREACTIVE
HBV SURFACE AG SERPL QL IA: NONREACTIVE
HCV AB SERPL QL IA: NONREACTIVE

## 2023-03-21 NOTE — TELEPHONE ENCOUNTER
"Contact patient and make sure he seen his [employer] message.    \"Your liver tests are stable.  Continue to stay off of rosuvastatin.  Recheck your liver test next week.  Mild elevation of urea nitrogen and creatinine likely signifies some dehydration.  Do not take any ibuprofen or Aleve.    If your blood pressure is still running low, less than 110/60, I would recommend you not take your losartan until your blood pressure gets higher.  We will recheck your kidney labs next week.    Schedule a nonfasting lab appointment.  \"    Help him schedule a lab appointment for next week.  Make sure he does not take his rosuvastatin at this time.  Make sure he is monitoring his blood pressure and consider having him not take losartan if blood pressure is still low.  "

## 2023-03-21 NOTE — TELEPHONE ENCOUNTER
"Outgoing Call:    Dr. Gtz's message relayed to pt  Pt verbalized understanding     Pt stating drinking 3 24 oz/daily of water  Pt denying taking ibuprofen or Aleve stating \"I was told to not take that when I had my heart attack.\"  Pt scheduled for lab appt dated 3.30.23 1:00    Pt had no further questions or concerns    Message will be routed to PCP for review    Lisa CLEVELAND RN  [facility]    "

## 2023-03-22 ENCOUNTER — HOSPITAL ENCOUNTER (OUTPATIENT)
Dept: ULTRASOUND IMAGING | Facility: CLINIC | Age: 58
Discharge: HOME | End: 2023-03-22
Attending: INTERNAL MEDICINE | Admitting: INTERNAL MEDICINE
Payer: COMMERCIAL

## 2023-03-22 DIAGNOSIS — R79.89 ELEVATED LIVER FUNCTION TESTS: ICD-10-CM

## 2023-03-22 PROCEDURE — 76705 ECHO EXAM OF ABDOMEN: CPT

## 2023-03-30 ENCOUNTER — LAB (OUTPATIENT)
Dept: LAB | Facility: CLINIC | Age: 58
End: 2023-03-30
Payer: COMMERCIAL

## 2023-03-30 DIAGNOSIS — Z51.81 ENCOUNTER FOR THERAPEUTIC DRUG MONITORING: ICD-10-CM

## 2023-03-30 LAB
ALBUMIN SERPL BCG-MCNC: 4 G/DL (ref 3.5–5.2)
ALP SERPL-CCNC: 56 U/L (ref 40–129)
ALT SERPL W P-5'-P-CCNC: 60 U/L (ref 10–50)
ANION GAP SERPL CALCULATED.3IONS-SCNC: 11 MMOL/L (ref 7–15)
AST SERPL W P-5'-P-CCNC: 36 U/L (ref 10–50)
BILIRUB SERPL-MCNC: 1.2 MG/DL
BUN SERPL-MCNC: 18.6 MG/DL (ref 6–20)
CALCIUM SERPL-MCNC: 9.6 MG/DL (ref 8.6–10)
CHLORIDE SERPL-SCNC: 101 MMOL/L (ref 98–107)
CREAT SERPL-MCNC: 1.2 MG/DL (ref 0.67–1.17)
DEPRECATED HCO3 PLAS-SCNC: 25 MMOL/L (ref 22–29)
GFR SERPL CREATININE-BSD FRML MDRD: 70 ML/MIN/1.73M2
GLUCOSE SERPL-MCNC: 92 MG/DL (ref 70–99)
POTASSIUM SERPL-SCNC: 4.1 MMOL/L (ref 3.4–5.3)
PROT SERPL-MCNC: 7.1 G/DL (ref 6.4–8.3)
SODIUM SERPL-SCNC: 137 MMOL/L (ref 136–145)

## 2023-03-30 PROCEDURE — 36415 COLL VENOUS BLD VENIPUNCTURE: CPT

## 2023-03-30 PROCEDURE — 80053 COMPREHEN METABOLIC PANEL: CPT

## 2023-03-31 ENCOUNTER — TELEPHONE (OUTPATIENT)
Dept: INTERNAL MEDICINE | Facility: CLINIC | Age: 58
End: 2023-03-31
Payer: COMMERCIAL

## 2023-03-31 DIAGNOSIS — Z51.81 ENCOUNTER FOR THERAPEUTIC DRUG MONITORING: Primary | ICD-10-CM

## 2023-03-31 NOTE — TELEPHONE ENCOUNTER
Pt reviewed results 3/31/2023.  Sent follow-up message to Pt to assist in scheduling labs.     Alize Saldivar RN, BSN  [facility]

## 2023-03-31 NOTE — TELEPHONE ENCOUNTER
Make sure patient sees the [employer] message regarding his labs.  His liver tests are better but still mildly elevated.  He can restart the rosuvastatin but at 1/2 tablet a day, or 20 mg a day.  Have him recheck his liver tests, nonfasting labs in 3 to 4 weeks .

## 2023-04-28 ENCOUNTER — LAB (OUTPATIENT)
Dept: LAB | Facility: CLINIC | Age: 58
End: 2023-04-28
Payer: COMMERCIAL

## 2023-04-28 DIAGNOSIS — Z51.81 ENCOUNTER FOR THERAPEUTIC DRUG MONITORING: ICD-10-CM

## 2023-04-28 LAB
ALBUMIN SERPL BCG-MCNC: 4.3 G/DL (ref 3.5–5.2)
ALP SERPL-CCNC: 49 U/L (ref 40–129)
ALT SERPL W P-5'-P-CCNC: 46 U/L (ref 10–50)
ANION GAP SERPL CALCULATED.3IONS-SCNC: 12 MMOL/L (ref 7–15)
AST SERPL W P-5'-P-CCNC: 35 U/L (ref 10–50)
BILIRUB SERPL-MCNC: 1.6 MG/DL
BUN SERPL-MCNC: 18.1 MG/DL (ref 6–20)
CALCIUM SERPL-MCNC: 9.9 MG/DL (ref 8.6–10)
CHLORIDE SERPL-SCNC: 103 MMOL/L (ref 98–107)
CREAT SERPL-MCNC: 1.21 MG/DL (ref 0.67–1.17)
DEPRECATED HCO3 PLAS-SCNC: 24 MMOL/L (ref 22–29)
GFR SERPL CREATININE-BSD FRML MDRD: 69 ML/MIN/1.73M2
GLUCOSE SERPL-MCNC: 106 MG/DL (ref 70–99)
POTASSIUM SERPL-SCNC: 4.4 MMOL/L (ref 3.4–5.3)
PROT SERPL-MCNC: 7.1 G/DL (ref 6.4–8.3)
SODIUM SERPL-SCNC: 139 MMOL/L (ref 136–145)

## 2023-04-28 PROCEDURE — 80053 COMPREHEN METABOLIC PANEL: CPT

## 2023-04-28 PROCEDURE — 36415 COLL VENOUS BLD VENIPUNCTURE: CPT

## 2023-06-30 ASSESSMENT — ENCOUNTER SYMPTOMS
HEARTBURN: 0
DIZZINESS: 0
EYE PAIN: 0
FEVER: 0
CONSTIPATION: 0
FREQUENCY: 0
CHILLS: 0
SHORTNESS OF BREATH: 0
HEMATOCHEZIA: 0
NAUSEA: 0
DIARRHEA: 0
PALPITATIONS: 0
DYSURIA: 0
ABDOMINAL PAIN: 0
PARESTHESIAS: 0
WEAKNESS: 0
ARTHRALGIAS: 1
COUGH: 0
HEMATURIA: 0
HEADACHES: 0
SORE THROAT: 0
MYALGIAS: 1
JOINT SWELLING: 0
NERVOUS/ANXIOUS: 0

## 2023-07-07 ENCOUNTER — OFFICE VISIT (OUTPATIENT)
Dept: INTERNAL MEDICINE | Facility: CLINIC | Age: 58
End: 2023-07-07
Payer: COMMERCIAL

## 2023-07-07 VITALS
TEMPERATURE: 97.8 F | BODY MASS INDEX: 29.54 KG/M2 | WEIGHT: 211 LBS | HEART RATE: 78 BPM | HEIGHT: 71 IN | SYSTOLIC BLOOD PRESSURE: 116 MMHG | DIASTOLIC BLOOD PRESSURE: 78 MMHG | RESPIRATION RATE: 16 BRPM | OXYGEN SATURATION: 99 %

## 2023-07-07 DIAGNOSIS — I25.10 CORONARY ARTERY DISEASE DUE TO CALCIFIED CORONARY LESION: ICD-10-CM

## 2023-07-07 DIAGNOSIS — M25.561 RIGHT KNEE PAIN, UNSPECIFIED CHRONICITY: ICD-10-CM

## 2023-07-07 DIAGNOSIS — I10 ESSENTIAL HYPERTENSION: ICD-10-CM

## 2023-07-07 DIAGNOSIS — R93.89 ABNORMAL CHEST CT: ICD-10-CM

## 2023-07-07 DIAGNOSIS — R73.9 HYPERGLYCEMIA: ICD-10-CM

## 2023-07-07 DIAGNOSIS — I25.84 CORONARY ARTERY DISEASE DUE TO CALCIFIED CORONARY LESION: ICD-10-CM

## 2023-07-07 DIAGNOSIS — Z00.00 HEALTH MAINTENANCE EXAMINATION: Primary | ICD-10-CM

## 2023-07-07 DIAGNOSIS — M25.511 CHRONIC RIGHT SHOULDER PAIN: ICD-10-CM

## 2023-07-07 DIAGNOSIS — Z51.81 ENCOUNTER FOR THERAPEUTIC DRUG MONITORING: ICD-10-CM

## 2023-07-07 DIAGNOSIS — M72.2 PLANTAR FASCIITIS, LEFT: ICD-10-CM

## 2023-07-07 DIAGNOSIS — G89.29 CHRONIC RIGHT SHOULDER PAIN: ICD-10-CM

## 2023-07-07 LAB
ALBUMIN SERPL BCG-MCNC: 4.5 G/DL (ref 3.5–5.2)
ALP SERPL-CCNC: 50 U/L (ref 40–129)
ALT SERPL W P-5'-P-CCNC: 51 U/L (ref 0–70)
ANION GAP SERPL CALCULATED.3IONS-SCNC: 9 MMOL/L (ref 7–15)
AST SERPL W P-5'-P-CCNC: 41 U/L (ref 0–45)
BILIRUB SERPL-MCNC: 1.9 MG/DL
BUN SERPL-MCNC: 14.5 MG/DL (ref 6–20)
CALCIUM SERPL-MCNC: 9.9 MG/DL (ref 8.6–10)
CHLORIDE SERPL-SCNC: 104 MMOL/L (ref 98–107)
CHOLEST SERPL-MCNC: 98 MG/DL
CK SERPL-CCNC: 167 U/L (ref 39–308)
CREAT SERPL-MCNC: 1.13 MG/DL (ref 0.67–1.17)
DEPRECATED HCO3 PLAS-SCNC: 27 MMOL/L (ref 22–29)
ERYTHROCYTE [DISTWIDTH] IN BLOOD BY AUTOMATED COUNT: 12.9 % (ref 10–15)
GFR SERPL CREATININE-BSD FRML MDRD: 75 ML/MIN/1.73M2
GLUCOSE SERPL-MCNC: 110 MG/DL (ref 70–99)
HBA1C MFR BLD: 5.2 % (ref 0–5.6)
HCT VFR BLD AUTO: 48.8 % (ref 40–53)
HDLC SERPL-MCNC: 38 MG/DL
HGB BLD-MCNC: 17.1 G/DL (ref 13.3–17.7)
LDLC SERPL CALC-MCNC: 37 MG/DL
MCH RBC QN AUTO: 30.3 PG (ref 26.5–33)
MCHC RBC AUTO-ENTMCNC: 35 G/DL (ref 31.5–36.5)
MCV RBC AUTO: 87 FL (ref 78–100)
NONHDLC SERPL-MCNC: 60 MG/DL
PLATELET # BLD AUTO: 183 10E3/UL (ref 150–450)
POTASSIUM SERPL-SCNC: 4.7 MMOL/L (ref 3.4–5.3)
PROT SERPL-MCNC: 7.6 G/DL (ref 6.4–8.3)
RBC # BLD AUTO: 5.64 10E6/UL (ref 4.4–5.9)
SODIUM SERPL-SCNC: 140 MMOL/L (ref 136–145)
TRIGL SERPL-MCNC: 114 MG/DL
WBC # BLD AUTO: 7.3 10E3/UL (ref 4–11)

## 2023-07-07 PROCEDURE — 82550 ASSAY OF CK (CPK): CPT | Performed by: INTERNAL MEDICINE

## 2023-07-07 PROCEDURE — 80061 LIPID PANEL: CPT | Performed by: INTERNAL MEDICINE

## 2023-07-07 PROCEDURE — 36415 COLL VENOUS BLD VENIPUNCTURE: CPT | Performed by: INTERNAL MEDICINE

## 2023-07-07 PROCEDURE — 99396 PREV VISIT EST AGE 40-64: CPT | Performed by: INTERNAL MEDICINE

## 2023-07-07 PROCEDURE — 85027 COMPLETE CBC AUTOMATED: CPT | Performed by: INTERNAL MEDICINE

## 2023-07-07 PROCEDURE — 80053 COMPREHEN METABOLIC PANEL: CPT | Performed by: INTERNAL MEDICINE

## 2023-07-07 PROCEDURE — 83036 HEMOGLOBIN GLYCOSYLATED A1C: CPT | Performed by: INTERNAL MEDICINE

## 2023-07-07 RX ORDER — LOSARTAN POTASSIUM 25 MG/1
25 TABLET ORAL EVERY MORNING
Qty: 90 TABLET | Refills: 3 | Status: SHIPPED | OUTPATIENT
Start: 2023-07-07 | End: 2024-07-12

## 2023-07-07 RX ORDER — ROSUVASTATIN CALCIUM 40 MG/1
40 TABLET, COATED ORAL DAILY
Qty: 90 TABLET | Refills: 3 | Status: SHIPPED | OUTPATIENT
Start: 2023-07-07 | End: 2024-07-12

## 2023-07-07 ASSESSMENT — ENCOUNTER SYMPTOMS
HEADACHES: 0
ARTHRALGIAS: 1
COUGH: 0
FEVER: 0
SHORTNESS OF BREATH: 0
PARESTHESIAS: 0
CHILLS: 0
MYALGIAS: 1
JOINT SWELLING: 0
DIARRHEA: 0
ABDOMINAL PAIN: 0
HEARTBURN: 0
CONSTIPATION: 0
WEAKNESS: 0
HEMATURIA: 0
DIZZINESS: 0
HEMATOCHEZIA: 0
SORE THROAT: 0
NAUSEA: 0
PALPITATIONS: 0
DYSURIA: 0
FREQUENCY: 0
NERVOUS/ANXIOUS: 0
EYE PAIN: 0

## 2023-07-07 NOTE — PROGRESS NOTES
SUBJECTIVE:   CC: Jose Raul is an 58 year old who presents for preventative health visit.    Patient had acute MI during COVID illness August 2022.  Drug-eluting stent to the circumflex placed.  Moderate disease of RCA and LAD.    No recurrent chest pain.  He exercises regularly, walks every day with good exertional ability and no increasing shortness of breath or chest pain.    No epigastric pain or bleeding or melena on aspirin and Brilinta.    He temporarily stopped the rosuvastatin because of elevated liver tests earlier this spring.  But liver ultrasound was normal in March of this year.  Liver tests were back to normal in April of this year.  Viral hepatitis serologies were negative in March of this year.  No right upper quadrant pain or jaundice.  He is on the 40 mg a day of rosuvastatin.  Cholesterol is well controlled on that dose last December with an LDL of 45.    No palpitations.  No lightheaded dizzy spells.    His blood pressure is usually in the 130/80 range.  Denies orthostasis.  On losartan 25 mg a day.    No lower extremity edema.    Patient also had a right upper lobe cavitary lesion in August of last year noted with his COVID.  AFB negative and TB quant interferon gold TB test negative/fungal test negative.  He had severe pneumonia back in 2020 that may have caused the cavitary lesion.    Follow-up chest CT scan in February of this year showed an improving granulomatous infection of the right upper lobe.  No new cough or respiratory symptoms at this time.  Denies shortness of breath.    Denies midline back pain.  There was some T10/T11 wedging with degenerative changes on CT scan in February.    He does have some chronic right shoulder pain.  He used to throw the javelin.  Some pain in the pectoral and acromioclavicular and sternoclavicular joints.  No radicular type pain.  It is nonexertional pain.    He also had a right knee meniscal injury when stepping the wrong way 2 months ago.  That is been  improving.  Does occasionally lock.  Pain on the lateral aspect of the right knee.  Has been some mild swelling but not currently.  That pain is significantly improving.    He also had a pickleball injury 2 months ago, takeoff injury the first time he played pickle ball and tore the plantar fascia in his left foot with some bruising.  That is also getting better but is having burning type pain in the morning when he first gets up.    He saw ophthalmology 2 weeks ago for routine eye exam, no problems.  No new headaches.    No mouth sores or swallowing problems.    No abdominal pain and bowels are normal.  October 2018 colonoscopy was negative in [place] with a 10-year follow-up plan.    No change in urination but does have 3 times a night nocturia.  PSA level was 2.7 last December.          7/7/2023     7:53 AM   Additional Questions   Roomed by Sia Leon 7/7/2023   Any forms needing to be completed Yes     Healthy Habits:     Getting at least 3 servings of Calcium per day:  NO    Bi-annual eye exam:  Yes    Dental care twice a year:  Yes    Sleep apnea or symptoms of sleep apnea:  None    Diet:  Low salt    Frequency of exercise:  6-7 days/week    Duration of exercise:  30-45 minutes    Taking medications regularly:  Yes    Medication side effects:  None    Additional concerns today:  No          Have you ever done Advance Care Planning? (For example, a Health Directive, POLST, or a discussion with a medical provider or your loved ones about your wishes): Yes, patient states has an Advance Care Planning document and will bring a copy to the clinic.    Social History     Tobacco Use     Smoking status: Never     Smokeless tobacco: Never   Substance Use Topics     Alcohol use: Not on file             6/30/2023    11:33 AM   Alcohol Use   Prescreen: >3 drinks/day or >7 drinks/week? No          No data to display                Last PSA:   Prostate Specific Antigen Screen   Date Value Ref Range Status  "  12/30/2022 2.72 0.00 - 3.50 ng/mL Final   09/23/2011 1.23 <4.01 ng/mL Final       Reviewed orders with patient. Reviewed health maintenance and updated orders accordingly - Yes  Lab work is in process  Current Outpatient Medications   Medication Sig Dispense Refill     aspirin (ASA) 81 MG chewable tablet Take 1 tablet (81 mg) by mouth daily Starting tomorrow. 30 tablet 3     famotidine (PEPCID) 40 MG tablet Take 40 mg by mouth as needed       fish oil-omega-3 fatty acids 1000 MG capsule Take 1 g by mouth 2 times daily (with meals) Take after lunch and dinner.       losartan (COZAAR) 25 MG tablet Take 1 tablet (25 mg) by mouth every morning 90 tablet 3     Misc Natural Products (OSTEO BI-FLEX JOINT SHIELD) TABS Take 1 tablet by mouth 2 times daily (with meals) Take after lunch and dinner.       rosuvastatin (CRESTOR) 40 MG tablet Take 1 tablet (40 mg) by mouth daily 90 tablet 3     ticagrelor (BRILINTA) 90 MG tablet Take 1 tablet (90 mg) by mouth 2 times daily Dose to start tomorrow morning. 180 tablet 3     vitamin D3 (CHOLECALCIFEROL) 50 mcg (2000 units) tablet Take 50 mcg by mouth every morning       nitroGLYcerin (NITROSTAT) 0.4 MG sublingual tablet One tablet under the tongue every 5 minutes if needed for chest pain. May repeat every 5 minutes for a maximum of 3 doses in 15 minutes\" (Patient not taking: Reported on 7/7/2023) 25 tablet 3     Allergies   Allergen Reactions     Cephalexin Hives and Rash       Reviewed and updated as needed this visit by clinical staff   Tobacco  Allergies  Meds              Reviewed and updated as needed this visit by Provider                 No past medical history on file.   Past Surgical History:   Procedure Laterality Date     CV CORONARY ANGIOGRAM N/A 8/24/2022    Procedure: CV CORONARY ANGIOGRAM;  Surgeon: Alize Ortega MD;  Location: [facility] CATH LAB CV     CV LEFT HEART CATH N/A 8/24/2022    Procedure: Left Heart Catheterization;  Surgeon: Alize Ortega MD;  " "Location: [facility]     CV PCI STENT DRUG ELUTING N/A 8/24/2022    Procedure: Percutaneous Coronary Intervention Stent;  Surgeon: Alize Ortega MD;  Location: [facility]     ZZC APPENDECTOMY      Description: Appendectomy;  Recorded: 02/19/2010;       Review of Systems   Constitutional: Negative for chills and fever.   HENT: Negative for congestion, ear pain, hearing loss and sore throat.    Eyes: Negative for pain and visual disturbance.   Respiratory: Negative for cough and shortness of breath.    Cardiovascular: Negative for chest pain, palpitations and peripheral edema.   Gastrointestinal: Negative for abdominal pain, constipation, diarrhea, heartburn, hematochezia and nausea.   Genitourinary: Negative for dysuria, frequency, genital sores, hematuria, impotence, penile discharge and urgency.   Musculoskeletal: Positive for arthralgias and myalgias. Negative for joint swelling.   Skin: Negative for rash.   Neurological: Negative for dizziness, weakness, headaches and paresthesias.   Psychiatric/Behavioral: Negative for mood changes. The patient is not nervous/anxious.      Review of systems is otherwise negative    OBJECTIVE:   /78 (BP Location: Right arm, Patient Position: Sitting)   Pulse 78   Temp 97.8  F (36.6  C)   Resp 16   Ht 1.803 m (5' 10.98\")   Wt 95.7 kg (211 lb)   SpO2 99%   BMI 29.44 kg/m      Physical Exam  Healthy-appearing male.  Normal mood and affect.  Normal cognition.  Normal mobility.  Pupils and irises equal and reactive.  Extraocular muscles intact.  No jaundice or conjunctivitis.  External ears and nose exam is normal.  Normal tympanic membranes and minimal cerumen.  Pharynx appears normal.  Teeth in good condition.  No oral lesions.  No supraclavicular cervical or axillary adenopathy.  No JVD and no carotid bruits.  No thyromegaly or nodularity.  Sternoclavicular joint appears within normal limits.  Lungs are clear to auscultation with good respiratory " excursion.  He has a dermal nevus mole on his left back that he reports is stable.  No suspicious skin lesions noted.  Heart is regular without murmur rub or gallop.  +1 pedal pulses and no ankle edema.  Plantar fascia appears normal on inspection and palpation.  There is no Achilles tendinitis tenderness on exam.  Right knee without swelling or effusion.  Abdomen is nonobese nontender no hepatosplenomegaly and liver edge is normal.  No pulsatile abdominal mass.    ASSESSMENT/PLAN:   (Z00.00) Health maintenance examination  (primary encounter diagnosis)  Comment: Patient's main health maintenance issue is coronary artery disease.    He has excellent exercise routine at this time, but he was told to avoid sports that can lead to injury such as pickleball.    He stated he had a tetanus shot in [place] approximately 2021 and he states he had the shingles vaccine previously, likely [place].  Plan: I recommended he get a COVID shot and flu shot in the fall    Routine eye exam 2 weeks ago is okay, repeat in 2 years    (I10) Essential hypertension  Comment: Controlled.  See patient instructions for monitoring blood pressure.  Continue losartan 25 mg a day.  Plan: losartan (COZAAR) 25 MG tablet            (I25.10,  I25.84) Coronary artery disease due to calcified coronary lesion  Comment: Asymptomatic and good exercise tolerance.  1 year anniversary of stent in August, he will stop Brilinta and increase aspirin to 162 mg on September 1.    Continue rosuvastatin 40 mg if she appears to be tolerating well at this time.  Unclear cause of elevated liver tests in March of this year.  Plan: Lipid Profile            (R93.89) Abnormal chest CT  Comment: Right upper lobe cavitary lesion which was noted with acute illness with COVID August 2022.  Severe pneumonia in 2020 likely cause.  Work-up for tuberculosis was negative.  He said no new symptoms.  Improving chest CT scan in February of this year.    If any recurrent symptoms,  repeat chest CT scan otherwise I will plan a 1 year repeat chest CT scan in February of next year  Plan: He will see me in January of next year to set that up    (Z51.81) Encounter for therapeutic drug monitoring  Comment:   Plan: CBC with platelets, Comprehensive metabolic         panel, CK total            (M72.2) Plantar fasciitis, left  Comment: Pickleball injury.  Wear good orthotic shoes.  Plan:     (M25.511,  G89.29) Chronic right shoulder pain  Comment: Chronic tendinitis and DJD pain of the shoulder from previous sports use.  Regular range of motion exercises was recommended.  He declined referral to physical therapy.  Plan: I recommended he not use NSAIDs or other pain medication to cover up his pain.    (M25.561) Right knee pain, unspecified chronicity  Comment: Consistent with a lateral meniscal injury.  Now improving.  Can follow-up with orthopedic clinic as needed  Plan:         Patient instructions:  On September 1 stop the Brilinta and increase aspirin to 162 mg a day.    For your plantar fasciitis: Wear good orthotic shoes at all times.  Do not walk barefoot.  Consider getting Haefflinger orthotic slippers at [employer] to wear at home.  I would recommend avoiding pickleball.  Continue regular daily walking.    Range of motion exercises for your right shoulder.  You could see physical therapy for that if you wish.    Right knee pain is likely a meniscal tear, as it is getting better now, just avoid strain activity or maneuvers that cause pain in the knee.  If the knee pain worsens or locks up on you more often, see the orthopedic clinic.    Plan to repeat your chest CT scan in February of next year for a final 1 year recheck.  If you do develop increasing cough or respiratory symptoms before that follow-up at that time.    Goal blood pressure less than 135/85 but not less than 110/60.  Contact me if your blood pressure is running outside that range more than occasionally.    Your colonoscopy will  "be due October 2028.    Your PSA prostate cancer check will be due after December of this year, that can be checked at next appointment        COUNSELING:   Reviewed preventive health counseling, as reflected in patient instructions       Regular exercise       Healthy diet/nutrition      BMI:   Estimated body mass index is 29.44 kg/m  as calculated from the following:    Height as of this encounter: 1.803 m (5' 10.98\").    Weight as of this encounter: 95.7 kg (211 lb).         He reports that he has never smoked. He has never used smokeless tobacco.            Mio Gtz MD  [facility]  "

## 2023-07-07 NOTE — PATIENT INSTRUCTIONS
On September 1 stop the Brilinta and increase aspirin to 162 mg a day.    For your plantar fasciitis: Wear good orthotic shoes at all times.  Do not walk barefoot.  Consider getting Haefflinger orthotic slippers at [employer] to wear at home.  I would recommend avoiding pickleball.  Continue regular daily walking.    Range of motion exercises for your right shoulder.  You could see physical therapy for that if you wish.    Right knee pain is likely a meniscal tear, as it is getting better now, just avoid strain activity or maneuvers that cause pain in the knee.  If the knee pain worsens or locks up on you more often, see the orthopedic clinic.    Plan to repeat your chest CT scan in February of next year for a final 1 year recheck.  If you do develop increasing cough or respiratory symptoms before that follow-up at that time.    Goal blood pressure less than 135/85 but not less than 110/60.  Contact me if your blood pressure is running outside that range more than occasionally.    Your colonoscopy will be due October 2028.    Your PSA prostate cancer check will be due after December of this year, that can be checked at next appointment

## 2023-08-24 NOTE — PHARMACY-ADMISSION MEDICATION HISTORY
Pharmacy Note - Admission Medication History    Pertinent Provider Information: None. ______________________________________________________________________    Prior To Admission (PTA) med list completed and updated in EMR.       PTA Med List   Medication Sig Note Last Dose     fish oil-omega-3 fatty acids 1000 MG capsule Take 1 g by mouth 2 times daily (with meals) Take after lunch and dinner. 8/21/2022: Take after lunch and dinner.         ibuprofen (ADVIL/MOTRIN) 200 MG tablet Take 400 mg by mouth At Bedtime  8/20/2022 at Unknown time     losartan (COZAAR) 25 MG tablet Take 25 mg by mouth every morning  8/21/2022 at Unknown time     Misc Natural Products (OSTEO BI-FLEX JOINT SHIELD) TABS Take 1 tablet by mouth 2 times daily (with meals) Take after lunch and dinner. 8/21/2022: Take after lunch and dinner.         vitamin D3 (CHOLECALCIFEROL) 50 mcg (2000 units) tablet Take 50 mcg by mouth every morning  8/21/2022 at Unknown time     Information source(s): Patient, Family member and CareEverywhere/SureScripts  Method of interview communication: in-person    Summary of Changes to PTA Med List  New: None  Discontinued: None  Changed: None    Patient was asked about OTC/herbal products specifically.  PTA med list reflects this.    In the past week, patient estimated taking medication this percent of the time:  greater than 90%.    Allergies were reviewed, assessed, and updated with the patient.      Patient does not use any multi-dose medications prior to admission.    The information provided in this note is only as accurate as the sources available at the time of the update(s).    Thank you for the opportunity to participate in the care of this patient.    Genesis Knowles, PharmD, BCPS  8/21/2022 5:18 PM    
Pharmacy Vancomycin Initial Note  Date of Service 2022  Patient's  1965  57 year old, male    Indication: Abscess    Current estimated CrCl = Estimated Creatinine Clearance: 88.8 mL/min (based on SCr of 1.12 mg/dL).    Creatinine for last 3 days  2022:  4:13 PM Creatinine 1.12 mg/dL    Recent Vancomycin Level(s) for last 3 days  No results found for requested labs within last 72 hours.      Vancomycin IV Administrations (past 72 hours)                   vancomycin 2000 mg in 0.9% NaCl 500 ml intermittent infusion 2,000 mg (mg) 2,000 mg New Bag 22 1947                Nephrotoxins and other renal medications (From now, onward)    Start     Dose/Rate Route Frequency Ordered Stop    22 1400  vancomycin 1500 mg in 0.9% NaCl 250 ml intermittent infusion 1,500 mg         1,500 mg  over 90 Minutes Intravenous EVERY 18 HOURS 22 2150            Contrast Orders - past 72 hours (72h ago, onward)    Start     Dose/Rate Route Frequency Stop    22 1830  iopamidol (ISOVUE-370) solution 100 mL         100 mL Intravenous ONCE 22 1813          [employer] Prediction of Planned Initial Vancomycin Regimen  Loading dose: 2000mg iv x 1 ED 22 at 2000  Regimen: 1500 mg IV every 18 hours.  Start time: 14:00 on 2022  Exposure target: AUC24 (range)400-600 mg/L.hr   AUC24,ss: 482 mg/L.hr  Probability of AUC24 > 400: 70 %  Ctrough,ss: 14.4 mg/L  Probability of Ctrough,ss > 20: 23 %  Probability of nephrotoxicity (Lodise BERNARDO ): 10 %        Plan:  1. Start vancomycin 1500 mg IV q18 h.   2. Vancomycin monitoring method: AUC  3. Vancomycin therapeutic monitoring goal: 400-600 mg*h/L  4. Pharmacy will check vancomycin levels as appropriate in 1-3 Days.    5. Serum creatinine levels will be ordered daily for the first week of therapy and at least twice weekly for subsequent weeks.      Summer Mosher, DeedeeD  
Pj spouse

## 2023-11-28 ENCOUNTER — MYC MEDICAL ADVICE (OUTPATIENT)
Dept: INTERNAL MEDICINE | Facility: CLINIC | Age: 58
End: 2023-11-28
Payer: COMMERCIAL

## 2024-01-15 ENCOUNTER — OFFICE VISIT (OUTPATIENT)
Dept: INTERNAL MEDICINE | Facility: CLINIC | Age: 59
End: 2024-01-15
Payer: COMMERCIAL

## 2024-01-15 VITALS
SYSTOLIC BLOOD PRESSURE: 118 MMHG | HEIGHT: 70 IN | TEMPERATURE: 97.5 F | RESPIRATION RATE: 16 BRPM | OXYGEN SATURATION: 98 % | BODY MASS INDEX: 31.07 KG/M2 | HEART RATE: 70 BPM | WEIGHT: 217 LBS | DIASTOLIC BLOOD PRESSURE: 78 MMHG

## 2024-01-15 DIAGNOSIS — Z51.81 ENCOUNTER FOR THERAPEUTIC DRUG MONITORING: ICD-10-CM

## 2024-01-15 DIAGNOSIS — Z23 NEED FOR COVID-19 VACCINE: ICD-10-CM

## 2024-01-15 DIAGNOSIS — I25.10 CORONARY ARTERY DISEASE DUE TO CALCIFIED CORONARY LESION: Primary | ICD-10-CM

## 2024-01-15 DIAGNOSIS — I10 ESSENTIAL HYPERTENSION: ICD-10-CM

## 2024-01-15 DIAGNOSIS — I25.84 CORONARY ARTERY DISEASE DUE TO CALCIFIED CORONARY LESION: Primary | ICD-10-CM

## 2024-01-15 DIAGNOSIS — Z12.5 SCREENING FOR PROSTATE CANCER: ICD-10-CM

## 2024-01-15 DIAGNOSIS — R93.89 ABNORMAL CT OF THE CHEST: ICD-10-CM

## 2024-01-15 DIAGNOSIS — Z23 NEED FOR IMMUNIZATION AGAINST INFLUENZA: ICD-10-CM

## 2024-01-15 LAB
ALBUMIN SERPL BCG-MCNC: 4.7 G/DL (ref 3.5–5.2)
ALP SERPL-CCNC: 50 U/L (ref 40–150)
ALT SERPL W P-5'-P-CCNC: 33 U/L (ref 0–70)
ANION GAP SERPL CALCULATED.3IONS-SCNC: 13 MMOL/L (ref 7–15)
AST SERPL W P-5'-P-CCNC: 33 U/L (ref 0–45)
BILIRUB SERPL-MCNC: 2.5 MG/DL
BUN SERPL-MCNC: 16.8 MG/DL (ref 6–20)
CALCIUM SERPL-MCNC: 10.1 MG/DL (ref 8.6–10)
CHLORIDE SERPL-SCNC: 103 MMOL/L (ref 98–107)
CHOLEST SERPL-MCNC: 87 MG/DL
CREAT SERPL-MCNC: 1.15 MG/DL (ref 0.67–1.17)
DEPRECATED HCO3 PLAS-SCNC: 24 MMOL/L (ref 22–29)
EGFRCR SERPLBLD CKD-EPI 2021: 74 ML/MIN/1.73M2
FASTING STATUS PATIENT QL REPORTED: YES
GLUCOSE SERPL-MCNC: 85 MG/DL (ref 70–99)
HDLC SERPL-MCNC: 42 MG/DL
LDLC SERPL CALC-MCNC: 31 MG/DL
NONHDLC SERPL-MCNC: 45 MG/DL
POTASSIUM SERPL-SCNC: 4.7 MMOL/L (ref 3.4–5.3)
PROT SERPL-MCNC: 7.4 G/DL (ref 6.4–8.3)
PSA SERPL DL<=0.01 NG/ML-MCNC: 3.3 NG/ML (ref 0–3.5)
SODIUM SERPL-SCNC: 140 MMOL/L (ref 135–145)
TRIGL SERPL-MCNC: 69 MG/DL

## 2024-01-15 PROCEDURE — 90480 ADMN SARSCOV2 VAC 1/ONLY CMP: CPT | Performed by: INTERNAL MEDICINE

## 2024-01-15 PROCEDURE — 91320 SARSCV2 VAC 30MCG TRS-SUC IM: CPT | Performed by: INTERNAL MEDICINE

## 2024-01-15 PROCEDURE — 80061 LIPID PANEL: CPT | Performed by: INTERNAL MEDICINE

## 2024-01-15 PROCEDURE — 90471 IMMUNIZATION ADMIN: CPT | Performed by: INTERNAL MEDICINE

## 2024-01-15 PROCEDURE — 90682 RIV4 VACC RECOMBINANT DNA IM: CPT | Performed by: INTERNAL MEDICINE

## 2024-01-15 PROCEDURE — 99214 OFFICE O/P EST MOD 30 MIN: CPT | Mod: 25 | Performed by: INTERNAL MEDICINE

## 2024-01-15 PROCEDURE — 80053 COMPREHEN METABOLIC PANEL: CPT | Performed by: INTERNAL MEDICINE

## 2024-01-15 PROCEDURE — 36415 COLL VENOUS BLD VENIPUNCTURE: CPT | Performed by: INTERNAL MEDICINE

## 2024-01-15 PROCEDURE — G0103 PSA SCREENING: HCPCS | Performed by: INTERNAL MEDICINE

## 2024-01-15 NOTE — PROGRESS NOTES
Jose Raul Larsen   58 year old male    Date of Visit: 1/15/2024    Chief Complaint   Patient presents with    RECHECK     Left arm pain. Bloody nose- week in a half. Two or three     Subjective  58-year-old male that had an MI during COVID August 2022 with a drug-eluting stent to the circumflex.  Moderate disease of RCA and LAD.  He is now just on aspirin 162 mg a day and no longer on Brilinta.  Still on rosuvastatin 40 mg a day.  No significant myalgias.    He is walking and active regularly.  He uses the treadmill and elliptical machine for 15 minutes a day during the winter.  No chest pain or exertional symptoms.    Normal CK and liver tests and an LDL of 37 back in July.    His blood pressure is well-controlled.  He is getting some mild higher blood pressures in the 140/80 range when stressed at work.  But at home it was in the 120/70 range.  Denies orthostasis.  On losartan 50 mg a day.    During his illness in 2020 he had a right upper lobe cavitary lesion noted.  There was some granulomatous nodularity.  February 2023 CT scan showed improvement in his nodularity.  He has been AFB negative.  Quant interferon test negative.  He has severe pneumonia back in 2020 that he thinks may have been the cause of this.    He denies any new cough or change in breathing.  No chest pain.    Chronic right shoulder pain, previous javelin thrower and plays pickle ball.  He has had a previous left ulnar transposition surgery in the past and is having similar type radicular symptoms but up to his neck and back of his arm.  He is also having some residual numbness on his pinky finger but that is stable.  The symptoms are positional in nature and he can alleviate the symptoms with change in position.    Urinating normally.  December 2022 PSA level was 2.7.    He has had 3 nosebleeds over the past week but has been able to get them to stop within 5 minutes each time.  No sinusitis or nasal pain symptoms      PMHx:  No past  "medical history on file.  PSHx:    Past Surgical History:   Procedure Laterality Date    CV CORONARY ANGIOGRAM N/A 8/24/2022    Procedure: CV CORONARY ANGIOGRAM;  Surgeon: Alize Ortega MD;  Location: [facility]    CV LEFT HEART CATH N/A 8/24/2022    Procedure: Left Heart Catheterization;  Surgeon: Alize Ortega MD;  Location: [facility]    CV PCI STENT DRUG ELUTING N/A 8/24/2022    Procedure: Percutaneous Coronary Intervention Stent;  Surgeon: Alize Ortega MD;  Location: [facility] CV    ZZC APPENDECTOMY      Description: Appendectomy;  Recorded: 02/19/2010;     Immunizations:   Immunization History   Administered Date(s) Administered    COVID-19 Bivalent 12+ (Pfizer) 12/30/2022    COVID-19 MONOVALENT 12+ (Pfizer) 03/08/2021, 03/31/2021, 12/30/2021    DT (PEDS <7y) 07/30/2001    Influenza Vaccine >6 months,quad, PF 12/02/2022    Influenza Vaccine, 6+MO IM (QUADRIVALENT W/PRESERVATIVES) 09/28/2012    Influenza, seasonal, injectable, PF 09/23/2011    TDAP (Adacel,Boostrix) 02/19/2010    Td,adult,historic,unspecified 02/19/2010       ROS A comprehensive review of systems was performed and was otherwise negative    Medications, allergies, and problem list were reviewed and updated    Exam  /78 (BP Location: Right arm, Patient Position: Right side, Cuff Size: Adult Regular)   Pulse 70   Temp 97.5  F (36.4  C) (Oral)   Resp 16   Ht 1.778 m (5' 10\")   Wt 98.4 kg (217 lb)   SpO2 98%   BMI 31.14 kg/m    He appears well.  Mobility normal.  Lungs clear.  Heart is regular without murmur.  No edema.  Abdomen is nontender.    Assessment/Plan  1. Coronary artery disease due to calcified coronary lesion  Asymptomatic.  Good exercise tolerance.  Continue aspirin daily.  Drug-eluting stent August 2022.  - Lipid Profile    2. Essential hypertension  Controlled.  Continue low-dose losartan 25 mg daily    3. Abnormal CT of the chest  Previous nodularity.  Has been stable.  No new " pulmonary symptoms.  Non-smoker.  If the CT scan is stable to improved, no further follow-up CT scans planned for this  - CT Chest w/o Contrast; Future    4. Need for COVID-19 vaccine  Given today  - COVID-19 12+ (2023-24) (PFIZER)    5. Need for immunization against influenza  Given today  - INFLUENZA VACCINE 18-64Y (FLUBLOK)    6. Encounter for therapeutic drug monitoring    - Comprehensive metabolic panel    7. Screening for prostate cancer  Routine screening, yearly  - Prostate Specific Antigen Screen    Chronic right shoulder symptoms and some left ulnar nerve radicular symptoms.  Improved with change in position.  Follow-up if for any worsening    Epistaxis, consistent with current dry weather.  I discussed treatment as below.  Continue aspirin.      Return in about 9 months (around 10/1/2024) for Fasting physical exam.   Patient Instructions   Nosebleeds are common during this time of the year with dry air.  If you get a nosebleed pinch your nose and lean forward for at least 5 minutes to get it to stop.  If you continue to have recurrent nosebleeds that are not stopping, the emergency room for severe nosebleed or ENT or less severe.    Continue on your current aspirin dose.    Goal blood pressure less than 135/85 but not less than 110/60.  Contact me if your blood pressure is outside the range more than occasionally.    Schedule your CT scan of your chest to follow-up on the previous right upper lobe nodularity.  I anticipate this will be stable to improved, and this will be her last CT scan of the chest for this, if there is nothing worse.    Continue to change position or posture if you are having left arm pain or nerve pinching symptoms.  If that worsens, recommend you see the orthopedic clinic at that time.    Follow-up for a fasting physical exam this fall    Mio Gtz MD, MD        Current Outpatient Medications   Medication Sig Dispense Refill    aspirin (ASA) 81 MG chewable tablet Take 1 tablet  "(81 mg) by mouth daily Starting tomorrow. 30 tablet 3    famotidine (PEPCID) 40 MG tablet Take 40 mg by mouth as needed      fish oil-omega-3 fatty acids 1000 MG capsule Take 1 g by mouth 2 times daily (with meals) Take after lunch and dinner.      losartan (COZAAR) 25 MG tablet Take 1 tablet (25 mg) by mouth every morning 90 tablet 3    Misc Natural Products (OSTEO BI-FLEX JOINT SHIELD) TABS Take 1 tablet by mouth 2 times daily (with meals) Take after lunch and dinner.      rosuvastatin (CRESTOR) 40 MG tablet Take 1 tablet (40 mg) by mouth daily 90 tablet 3    vitamin D3 (CHOLECALCIFEROL) 50 mcg (2000 units) tablet Take 50 mcg by mouth every morning      nitroGLYcerin (NITROSTAT) 0.4 MG sublingual tablet One tablet under the tongue every 5 minutes if needed for chest pain. May repeat every 5 minutes for a maximum of 3 doses in 15 minutes\" (Patient not taking: Reported on 7/7/2023) 25 tablet 3     Allergies   Allergen Reactions    Cephalexin Hives and Rash     Social History     Tobacco Use    Smoking status: Never    Smokeless tobacco: Never   Vaping Use    Vaping Use: Never used             Ed   Jose Raul is a 58 year old, presenting for the following health issues:  RECHECK (Left arm pain. Bloody nose- week in a half. Two or three)      History of Present Illness       Hypertension: He presents for follow up of hypertension.  He does check blood pressure  regularly outside of the clinic. Outside blood pressures have been over 140/90. He follows a low salt diet.     He eats 0-1 servings of fruits and vegetables daily.He consumes 0 sweetened beverage(s) daily.He exercises with enough effort to increase his heart rate 60 or more minutes per day.  He exercises with enough effort to increase his heart rate 7 days per week.   He is taking medications regularly.                 Review of Systems         Objective    There were no vitals taken for this visit.  There is no height or weight on file to calculate " BMI.  Physical Exam

## 2024-01-15 NOTE — PATIENT INSTRUCTIONS
Nosebleeds are common during this time of the year with dry air.  If you get a nosebleed pinch your nose and lean forward for at least 5 minutes to get it to stop.  If you continue to have recurrent nosebleeds that are not stopping, the emergency room for severe nosebleed or ENT or less severe.    Continue on your current aspirin dose.    Goal blood pressure less than 135/85 but not less than 110/60.  Contact me if your blood pressure is outside the range more than occasionally.    Schedule your CT scan of your chest to follow-up on the previous right upper lobe nodularity.  I anticipate this will be stable to improved, and this will be her last CT scan of the chest for this, if there is nothing worse.    Continue to change position or posture if you are having left arm pain or nerve pinching symptoms.  If that worsens, recommend you see the orthopedic clinic at that time.    Follow-up for a fasting physical exam this fall

## 2024-01-26 ENCOUNTER — HOSPITAL ENCOUNTER (OUTPATIENT)
Dept: CT IMAGING | Facility: CLINIC | Age: 59
Discharge: HOME | End: 2024-01-26
Attending: INTERNAL MEDICINE | Admitting: INTERNAL MEDICINE
Payer: COMMERCIAL

## 2024-01-26 DIAGNOSIS — R93.89 ABNORMAL CT OF THE CHEST: ICD-10-CM

## 2024-01-26 PROCEDURE — 71250 CT THORAX DX C-: CPT

## 2024-06-07 ENCOUNTER — PATIENT OUTREACH (OUTPATIENT)
Dept: CARE COORDINATION | Facility: CLINIC | Age: 59
End: 2024-06-07
Payer: COMMERCIAL

## 2024-07-07 SDOH — HEALTH STABILITY: PHYSICAL HEALTH: ON AVERAGE, HOW MANY DAYS PER WEEK DO YOU ENGAGE IN MODERATE TO STRENUOUS EXERCISE (LIKE A BRISK WALK)?: 7 DAYS

## 2024-07-07 SDOH — HEALTH STABILITY: PHYSICAL HEALTH: ON AVERAGE, HOW MANY MINUTES DO YOU ENGAGE IN EXERCISE AT THIS LEVEL?: 50 MIN

## 2024-07-07 ASSESSMENT — SOCIAL DETERMINANTS OF HEALTH (SDOH): HOW OFTEN DO YOU GET TOGETHER WITH FRIENDS OR RELATIVES?: TWICE A WEEK

## 2024-07-12 ENCOUNTER — OFFICE VISIT (OUTPATIENT)
Dept: INTERNAL MEDICINE | Facility: CLINIC | Age: 59
End: 2024-07-12
Payer: COMMERCIAL

## 2024-07-12 VITALS
WEIGHT: 211 LBS | HEIGHT: 70 IN | TEMPERATURE: 98.3 F | BODY MASS INDEX: 30.21 KG/M2 | DIASTOLIC BLOOD PRESSURE: 86 MMHG | SYSTOLIC BLOOD PRESSURE: 126 MMHG | OXYGEN SATURATION: 98 % | HEART RATE: 79 BPM | RESPIRATION RATE: 16 BRPM

## 2024-07-12 DIAGNOSIS — I25.10 CORONARY ARTERY DISEASE DUE TO CALCIFIED CORONARY LESION: ICD-10-CM

## 2024-07-12 DIAGNOSIS — Z00.00 HEALTHCARE MAINTENANCE: Primary | ICD-10-CM

## 2024-07-12 DIAGNOSIS — G47.19 EXCESSIVE DAYTIME SLEEPINESS: ICD-10-CM

## 2024-07-12 DIAGNOSIS — I10 ESSENTIAL HYPERTENSION: ICD-10-CM

## 2024-07-12 DIAGNOSIS — R35.0 URINARY FREQUENCY: ICD-10-CM

## 2024-07-12 DIAGNOSIS — I25.84 CORONARY ARTERY DISEASE DUE TO CALCIFIED CORONARY LESION: ICD-10-CM

## 2024-07-12 DIAGNOSIS — Z51.81 ENCOUNTER FOR THERAPEUTIC DRUG MONITORING: ICD-10-CM

## 2024-07-12 LAB
ALBUMIN SERPL BCG-MCNC: 4.5 G/DL (ref 3.5–5.2)
ALP SERPL-CCNC: 54 U/L (ref 40–150)
ALT SERPL W P-5'-P-CCNC: 19 U/L (ref 0–70)
ANION GAP SERPL CALCULATED.3IONS-SCNC: 10 MMOL/L (ref 7–15)
AST SERPL W P-5'-P-CCNC: 27 U/L (ref 0–45)
BILIRUB SERPL-MCNC: 2.8 MG/DL
BUN SERPL-MCNC: 17.5 MG/DL (ref 8–23)
CALCIUM SERPL-MCNC: 9.9 MG/DL (ref 8.6–10)
CHLORIDE SERPL-SCNC: 102 MMOL/L (ref 98–107)
CREAT SERPL-MCNC: 1.27 MG/DL (ref 0.67–1.17)
DEPRECATED HCO3 PLAS-SCNC: 28 MMOL/L (ref 22–29)
EGFRCR SERPLBLD CKD-EPI 2021: 65 ML/MIN/1.73M2
ERYTHROCYTE [DISTWIDTH] IN BLOOD BY AUTOMATED COUNT: 12.9 % (ref 10–15)
GLUCOSE SERPL-MCNC: 97 MG/DL (ref 70–99)
HCT VFR BLD AUTO: 49.3 % (ref 40–53)
HGB BLD-MCNC: 17 G/DL (ref 13.3–17.7)
MCH RBC QN AUTO: 30.2 PG (ref 26.5–33)
MCHC RBC AUTO-ENTMCNC: 34.5 G/DL (ref 31.5–36.5)
MCV RBC AUTO: 88 FL (ref 78–100)
PLATELET # BLD AUTO: 162 10E3/UL (ref 150–450)
POTASSIUM SERPL-SCNC: 4.3 MMOL/L (ref 3.4–5.3)
PROT SERPL-MCNC: 7.8 G/DL (ref 6.4–8.3)
PSA SERPL DL<=0.01 NG/ML-MCNC: 3.57 NG/ML (ref 0–3.5)
RBC # BLD AUTO: 5.63 10E6/UL (ref 4.4–5.9)
SODIUM SERPL-SCNC: 140 MMOL/L (ref 135–145)
WBC # BLD AUTO: 6.9 10E3/UL (ref 4–11)

## 2024-07-12 PROCEDURE — 80053 COMPREHEN METABOLIC PANEL: CPT | Performed by: INTERNAL MEDICINE

## 2024-07-12 PROCEDURE — 99396 PREV VISIT EST AGE 40-64: CPT | Performed by: INTERNAL MEDICINE

## 2024-07-12 PROCEDURE — 85027 COMPLETE CBC AUTOMATED: CPT | Performed by: INTERNAL MEDICINE

## 2024-07-12 PROCEDURE — 36415 COLL VENOUS BLD VENIPUNCTURE: CPT | Performed by: INTERNAL MEDICINE

## 2024-07-12 PROCEDURE — G0103 PSA SCREENING: HCPCS | Performed by: INTERNAL MEDICINE

## 2024-07-12 RX ORDER — LOSARTAN POTASSIUM 25 MG/1
25 TABLET ORAL 2 TIMES DAILY
Qty: 180 TABLET | Refills: 3 | Status: SHIPPED | OUTPATIENT
Start: 2024-07-12 | End: 2024-07-31

## 2024-07-12 RX ORDER — ROSUVASTATIN CALCIUM 40 MG/1
40 TABLET, COATED ORAL DAILY
Qty: 90 TABLET | Refills: 3 | Status: SHIPPED | OUTPATIENT
Start: 2024-07-12

## 2024-07-12 NOTE — PROGRESS NOTES
Preventive Care Visit  [facility]  Mio Gtz MD, Internal Medicine  Jul 12, 2024          Jose Raul Larsen   59 year old male    Date of Visit: 7/12/2024    Chief Complaint   Patient presents with    Physical     IS fasting. No concerns.    Hypertension     Has been noticing high blood pressures in the mornings lately- for about a week and a half. Running 140s/high 80s, evening low 130s/high 70s.     Subjective  59-year-old male here for health maintenance physical exam.  This independently with wife.    He suffered an MI during COVID August 2022 with circumflex drug-eluting stent.  Moderate disease RCA and LAD.  He is on aspirin and rosuvastatin.  No epigastric pain or GI bleeding history.  Has not had any generalized myalgia issues or liver issues on the rosuvastatin.    He remains active with elliptical  or treadmill and also walks for half hour every day.  No chest pain or exertional shortness of breath.  No lower extremity edema.  No palpitations.    His blood pressure had been controlled and was 118/78 in January of this year on losartan 25 mg a day.    About 3 weeks ago he noticed his blood pressure was increasing up in the 140s over 80s to 90.    He increased the losartan to 50 mg a day.    He still noticing blood pressures in the 130/86 range in the morning although it is 120/70 in the afternoon.  No lightheaded dizzy spells orthostasis.    In January of this year his creatinine was 1.1.    He denies any NSAID use other than his aspirin.    He has chronic right shoulder tendinitis pain, previous javelin thrower, but no new or changing tendinitis pain or shoulder pain.    He does have a sleep disturbance, has been chronic in the past but more prominent now.  He sometimes gets up 5 times a night to urinate, but sometimes not.  Urinates moderately frequently during the day but otherwise feels he has normal urination.  No dysuria.  He has had some daytime sleepiness, but  states that is chronic.    No morning headaches.    Occasional heartburn but rare.  Just takes Pepcid rarely.  No swallowing difficulty.    Previous pneumonia in 2020.  January 2024 chest CT scan showed stable right upper lobe granulomatous findings.  No further chest CT scan planned.  Previous AFB and TB quant test was negative    Bowels are normal.  No abdominal pain.  October 2018 colonoscopy was negative with a 10-year follow-up plan.    Previous left nerve transposition surgery.        PMHx:    Past Medical History:   Diagnosis Date    Heart disease     Hypertension      PSHx:    Past Surgical History:   Procedure Laterality Date    CV CORONARY ANGIOGRAM N/A 08/24/2022    Procedure: CV CORONARY ANGIOGRAM;  Surgeon: Alize Ortega MD;  Location: [facility] CV    CV LEFT HEART CATH N/A 08/24/2022    Procedure: Left Heart Catheterization;  Surgeon: Alize Ortega MD;  Location: [facility]    CV PCI STENT DRUG ELUTING N/A 08/24/2022    Procedure: Percutaneous Coronary Intervention Stent;  Surgeon: Alize Ortega MD;  Location: [facility]    HERNIA REPAIR      SOFT TISSUE SURGERY      [facility] APPENDECTOMY      Description: Appendectomy;  Recorded: 02/19/2010;     Immunizations:   Immunization History   Administered Date(s) Administered    COVID-19 12+ (2023-24) (Pfizer) 01/15/2024    COVID-19 Bivalent 12+ (Pfizer) 12/30/2022    COVID-19 MONOVALENT 12+ (Pfizer) 03/08/2021, 03/31/2021, 12/30/2021    DT (PEDS <7y) 07/30/2001    Influenza Vaccine 18-64 (Flublok) 01/15/2024    Influenza Vaccine >6 months,quad, PF 12/02/2022    Influenza Vaccine, 6+MO IM (QUADRIVALENT W/PRESERVATIVES) 09/28/2012    Influenza, seasonal, injectable, PF 09/23/2011    TDAP (Adacel,Boostrix) 02/19/2010    Td,adult,historic,unspecified 02/19/2010       ROS A comprehensive review of systems was performed and was otherwise negative    Medications, allergies, and problem list were reviewed and updated    Exam  BP  "126/86   Pulse 79   Temp 98.3  F (36.8  C) (Oral)   Resp 16   Ht 1.778 m (5' 10\")   Wt 95.7 kg (211 lb)   SpO2 98%   BMI 30.28 kg/m    Patient appears healthy with normal mobility.  Normal mood and affect.  Normal cognition.  He appears in good physical condition and is not overweight.  Pupils and irises equal and reactive.  Extraocular muscles intact.  No jaundice or conjunctivitis.  External ears and nose exam is normal with minimal cerumen and normal tympanic membranes.  Pharynx appears normal, he does not have a significantly crowded pharynx is neck is normal.  Teeth in good condition.  No cervical or supraclavicular or axillary adenopathy.  No JVD and no carotid bruits.  No thyromegaly or nodularity to inspection and palpation.  Lungs clear to auscultation with good respiratory excursion.  Heart is regular with no murmur rub or gallop.  +2 posterior tibialis pulses bilaterally with no ankle edema.  Feet in good condition although he is slightly flat feet.  Abdomen is nontender nonobese no hepatosplenomegaly and no pulsatile abdominal mass.  Skin exam without suspicious skin lesions.  He has a benign dermal nevus on back.  Declined  exam    Assessment/Plan  1. Healthcare maintenance  Main focus for health maintenance is cardiac health.  He has a good exercise routine and healthy diet.    I am somewhat concerned about his higher blood pressures in the morning recently and he has some mild chronic daytime sleepiness.  I am not overly suspicious for sleep apnea and his body habitus does not suggest that, but with these issues I will have him undergo a sleep evaluation.    See patient instructions for health maintenance plan.  He had a PSA checked in January of this year that was normal at 3.3, but I will recheck that as it was higher than previous and he is having urinary frequency issues.    He states he had a tetanus shot in 2021 in [place] and previous Shingrix vaccine.    He did see the ophthalmologist " last year for routine eye exam and reported that is normal    2. Coronary artery disease due to calcified coronary lesion  Asymptomatic with good exercise tolerance.  Continue aspirin, restart rosuvastatin, has been off it for the past week, he had stopped it because of high blood pressure, but he was told he did not need to stop that for high blood pressure in the future.  - rosuvastatin (CRESTOR) 40 MG tablet; Take 1 tablet (40 mg) by mouth daily  Dispense: 90 tablet; Refill: 3    3. Essential hypertension  Elevated blood pressures mainly in the morning.  Blood pressure had been previously well-controlled.  Weight is actually down, he is not taking NSAIDs, reports diet is good.    He is reporting increased sleep disturbance with his urinary frequency.  With the elevated blood pressures in the morning and the sleep disturbance and chronic daytime sleepiness I would like him to rule out apnea with the sleep clinic, despite body habitus not been suggested.    Increase losartan to 25 mg twice a day, instead of 50 mg in the morning, to see if spreading out the losartan helps control morning blood pressure.    He will check blood pressure on his own and record numbers and telephone follow-up later this month to discuss blood pressure management.  - losartan (COZAAR) 25 MG tablet; Take 1 tablet (25 mg) by mouth 2 times daily  Dispense: 180 tablet; Refill: 3    4. Encounter for therapeutic drug monitoring    - CBC with platelets  - Comprehensive metabolic panel    5. Urinary frequency  Suggestive of irritable bladder.  Urinary frequency is intermittent.  He is drinking more water in the evening.  No dysuria or prostatitis type symptoms.  I am rechecking the PSA level.  We can discuss this on the phone again in a few weeks.  Could consider urology referral if symptoms persist.    6. Excessive daytime sleepiness  Chronic issue.  Rule out sleep apnea  - Adult Sleep Eval & Management  Referral; Future      Return in  "19 days (on 7/31/2024) for Telephone call follow-up to discuss blood pressure.   Patient Instructions   Goal blood pressure less than 135/85, but not less than 110/60.    High blood pressure in the morning can sometimes represent sleep disturbance.  While I do not have a high suspicion for sleep apnea in you, I would recommend you get your sleep evaluated to rule out sleep apnea.  If you did have sleep apnea that would risk putting strain on your heart.    Make an appointment with the sleep clinic.    Change your losartan to 25 mg twice a day, in order to spread out the effects of the medicine.  Check your blood pressure and record numbers.  Telephone call follow-up later this month with me to discuss blood pressure.    Continue to get your regular exercise and maintain low-salt diet.  Do not take any ibuprofen or Advil or Aleve, as that can affect blood pressure and kidney function.    Restart your rosuvastatin.    Your colonoscopy will be due October 2028.        Mio Gtz MD, MD        Current Outpatient Medications   Medication Sig Dispense Refill    aspirin (ASA) 81 MG chewable tablet Take 1 tablet (81 mg) by mouth daily Starting tomorrow. 30 tablet 3    famotidine (PEPCID) 40 MG tablet Take 40 mg by mouth as needed      fish oil-omega-3 fatty acids 1000 MG capsule Take 1 g by mouth 2 times daily (with meals) Take after lunch and dinner.      losartan (COZAAR) 25 MG tablet Take 1 tablet (25 mg) by mouth 2 times daily 180 tablet 3    Misc Natural Products (OSTEO BI-FLEX JOINT SHIELD) TABS Take 1 tablet by mouth 2 times daily (with meals) Take after lunch and dinner.      nitroGLYcerin (NITROSTAT) 0.4 MG sublingual tablet One tablet under the tongue every 5 minutes if needed for chest pain. May repeat every 5 minutes for a maximum of 3 doses in 15 minutes\" 25 tablet 3    rosuvastatin (CRESTOR) 40 MG tablet Take 1 tablet (40 mg) by mouth daily 90 tablet 3    vitamin D3 (CHOLECALCIFEROL) 50 mcg (2000 units) " tablet Take 50 mcg by mouth every morning       Allergies   Allergen Reactions    Cephalexin Hives and Rash     Social History     Tobacco Use    Smoking status: Never    Smokeless tobacco: Never   Vaping Use    Vaping status: Never Used   Substance Use Topics    Alcohol use: Yes    Drug use: Never             Subjective   Jose Raul is a 59 year old, presenting for the following:  Physical (IS fasting. No concerns.) and Hypertension (Has been noticing high blood pressures in the mornings lately- for about a week and a half. Running 140s/high 80s, evening low 130s/high 70s.)        7/12/2024     8:02 AM   Additional Questions   Roomed by [facility] Care Directive  Patient does not have a Health Care Directive or Living Will: Full code    HPI              7/7/2024   General Health   How would you rate your overall physical health? Good   Feel stress (tense, anxious, or unable to sleep) Only a little      (!) STRESS CONCERN      7/7/2024   Nutrition   Three or more servings of calcium each day? (!) NO   Diet: Low salt    Low fat/cholesterol   How many servings of fruit and vegetables per day? (!) 2-3   How many sweetened beverages each day? 0-1       Multiple values from one day are sorted in reverse-chronological order         7/7/2024   Exercise   Days per week of moderate/strenous exercise 7 days   Average minutes spent exercising at this level 50 min            7/7/2024   Social Factors   Frequency of gathering with friends or relatives Twice a week   Worry food won't last until get money to buy more No   Food not last or not have enough money for food? No   Do you have housing? (Housing is defined as stable permanent housing and does not include staying ouside in a car, in a tent, in an abandoned building, in an overnight shelter, or couch-surfing.) Yes   Are you worried about losing your housing? No   Lack of transportation? No   Unable to get utilities (heat,electricity)? No            7/7/2024   Fall  "Risk   Fallen 2 or more times in the past year? No   Trouble with walking or balance? No             7/7/2024   Dental   Dentist two times every year? Yes                 Today's PHQ-2 Score:       1/15/2024    10:52 AM   PHQ-2 ( 1999 Pfizer)   Q1: Little interest or pleasure in doing things 0   Q2: Feeling down, depressed or hopeless 0   PHQ-2 Score 0   Q1: Little interest or pleasure in doing things Not at all   Q2: Feeling down, depressed or hopeless Not at all   PHQ-2 Score 0         7/7/2024   Substance Use   Alcohol more than 3/day or more than 7/wk No   Do you use any other substances recreationally? No        Social History     Tobacco Use    Smoking status: Never    Smokeless tobacco: Never   Vaping Use    Vaping status: Never Used   Substance Use Topics    Alcohol use: Yes    Drug use: Never           7/7/2024   STI Screening   New sexual partner(s) since last STI/HIV test? No      Last PSA:   Prostate Specific Antigen Screen   Date Value Ref Range Status   01/15/2024 3.30 0.00 - 3.50 ng/mL Final   09/23/2011 1.23 <4.01 ng/mL Final     ASCVD Risk   The ASCVD Risk score (Amado MOORE, et al., 2019) failed to calculate for the following reasons:    The patient has a prior MI or stroke diagnosis           Reviewed and updated as needed this visit by Provider                             Objective    Exam  BP (!) 131/92 (BP Location: Right arm, Patient Position: Sitting, Cuff Size: Adult Regular)   Pulse 79   Temp 98.3  F (36.8  C) (Oral)   Resp 16   Ht 1.778 m (5' 10\")   Wt 95.7 kg (211 lb)   SpO2 98%   BMI 30.28 kg/m     Estimated body mass index is 30.28 kg/m  as calculated from the following:    Height as of this encounter: 1.778 m (5' 10\").    Weight as of this encounter: 95.7 kg (211 lb).    Physical Exam          Signed Electronically by: Mio Gtz MD    "

## 2024-07-12 NOTE — PATIENT INSTRUCTIONS
Goal blood pressure less than 135/85, but not less than 110/60.    High blood pressure in the morning can sometimes represent sleep disturbance.  While I do not have a high suspicion for sleep apnea in you, I would recommend you get your sleep evaluated to rule out sleep apnea.  If you did have sleep apnea that would risk putting strain on your heart.    Make an appointment with the sleep clinic.    Change your losartan to 25 mg twice a day, in order to spread out the effects of the medicine.  Check your blood pressure and record numbers.  Telephone call follow-up later this month with me to discuss blood pressure.    Continue to get your regular exercise and maintain low-salt diet.  Do not take any ibuprofen or Advil or Aleve, as that can affect blood pressure and kidney function.    Restart your rosuvastatin.    Your colonoscopy will be due October 2028.

## 2024-07-26 ENCOUNTER — TELEPHONE (OUTPATIENT)
Dept: INTERNAL MEDICINE | Facility: CLINIC | Age: 59
End: 2024-07-26
Payer: COMMERCIAL

## 2024-07-31 ENCOUNTER — VIRTUAL VISIT (OUTPATIENT)
Dept: INTERNAL MEDICINE | Facility: CLINIC | Age: 59
End: 2024-07-31
Payer: COMMERCIAL

## 2024-07-31 DIAGNOSIS — G47.19 EXCESSIVE DAYTIME SLEEPINESS: ICD-10-CM

## 2024-07-31 DIAGNOSIS — I10 ESSENTIAL HYPERTENSION: Primary | ICD-10-CM

## 2024-07-31 DIAGNOSIS — I25.84 CORONARY ARTERY DISEASE DUE TO CALCIFIED CORONARY LESION: ICD-10-CM

## 2024-07-31 DIAGNOSIS — Z51.81 ENCOUNTER FOR THERAPEUTIC DRUG MONITORING: ICD-10-CM

## 2024-07-31 DIAGNOSIS — I25.10 CORONARY ARTERY DISEASE DUE TO CALCIFIED CORONARY LESION: ICD-10-CM

## 2024-07-31 PROCEDURE — 99442 PR PHYSICIAN TELEPHONE EVALUATION 11-20 MIN: CPT | Mod: 93 | Performed by: INTERNAL MEDICINE

## 2024-07-31 RX ORDER — LOSARTAN POTASSIUM 25 MG/1
TABLET ORAL
Qty: 270 TABLET | Refills: 3 | Status: SHIPPED | OUTPATIENT
Start: 2024-07-31

## 2024-07-31 NOTE — PROGRESS NOTES
Jose Raul is a 59 year old who is being evaluated via a billable telephone visit.    What phone number would you like to be contacted at?   How would you like to obtain your AVS? MyChart  Originating Location (pt. Location): Home    Distant Location (provider location):  On-site      Jose Raul Larsen   59 year old male    Date of Visit: 7/31/2024    Chief Complaint   Patient presents with    Follow Up     BP check.      Subjective  Telephone appointment for blood pressure follow-up.  He changed his losartan from 50 mg in the morning to 25 mg twice a day.  He was getting lower blood pressures during the day but high blood pressures in the morning.  Still getting high blood pressures in the morning around 150/70-90.  Continued daytime sleepiness and frequent sleep disturbance at night with nocturia.  Sometimes up to 5 times a night.  That remained stable.  No dysuria.    His PSA level is just mildly higher at 3.57 this month.    He denies lightheaded dizzy spells.  No low blood pressures.    Remains active with walking half hour every day and no exertional symptoms or edema.  No chest pain.    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  Circumflex stent.  On rosuvastatin and aspirin.    PMHx:    Past Medical History:   Diagnosis Date    Heart disease     Hypertension      PSHx:    Past Surgical History:   Procedure Laterality Date    CV CORONARY ANGIOGRAM N/A 08/24/2022    Procedure: CV CORONARY ANGIOGRAM;  Surgeon: Alize Ortega MD;  Location: [facility]    CV LEFT HEART CATH N/A 08/24/2022    Procedure: Left Heart Catheterization;  Surgeon: Alize Ortega MD;  Location: [facility]    CV PCI STENT DRUG ELUTING N/A 08/24/2022    Procedure: Percutaneous Coronary Intervention Stent;  Surgeon: Alize Ortega MD;  Location: [facility]    HERNIA REPAIR      SOFT TISSUE SURGERY      [facility] APPENDECTOMY      Description: Appendectomy;  Recorded: 02/19/2010;     Immunizations:   Immunization  History   Administered Date(s) Administered    COVID-19 12+ (2023-24) (Pfizer) 01/15/2024    COVID-19 Bivalent 12+ (Pfizer) 12/30/2022    COVID-19 MONOVALENT 12+ (Pfizer) 03/08/2021, 03/31/2021, 12/30/2021    DT (PEDS <7y) 07/30/2001    Influenza Vaccine 18-64 (Flublok) 01/15/2024    Influenza Vaccine >6 months,quad, PF 12/02/2022    Influenza Vaccine, 6+MO IM (QUADRIVALENT W/PRESERVATIVES) 09/28/2012    Influenza, seasonal, injectable, PF 09/23/2011    TDAP (Adacel,Boostrix) 02/19/2010    Td,adult,historic,unspecified 02/19/2010       ROS A comprehensive review of systems was performed and was otherwise negative    Medications, allergies, and problem list were reviewed and updated    Exam  There were no vitals taken for this visit.      Assessment/Plan  1. Essential hypertension  Still elevated blood pressures in the morning.  I am suspicious for sleep apnea.  Proceed with sleep study which is scheduled with the sleep clinic on September 30.    Increase the evening losartan to 50 mg a day.  Continue the morning losartan 25 mg a day.  Patient was warned about low blood pressure risk.  He was told to monitor blood pressure with goal less than 135/85 but not less than 110/60.    Recheck kidney labs in approximately 3 weeks with a lab appointment  - losartan (COZAAR) 25 MG tablet; Take 25 mg in the morning and 50 mg in the evening  Dispense: 270 tablet; Refill: 3    2. Coronary artery disease due to calcified coronary lesion  Asymptomatic with good exercise tolerance.  Continue rosuvastatin and aspirin    3. Encounter for therapeutic drug monitoring    - Basic metabolic panel; Future    4. Excessive daytime sleepiness  Suspected sleep apnea.  Proceed with sleep clinic appointment on September 30.    Urinary frequency consistent with BPH.  He may have some sleep disturbance with sleep apnea as well.  I did suggest patient can see urology for this condition, he did not wish to have a referral at this time, however.     "  Return in 2 months (on 10/15/2024) for Blood pressure follow-up appointment.   Patient Instructions   Increase the evening losartan to 50 mg.  Continue 25 mg of losartan in the morning.    Proceed with sleep study to evaluate for sleep apnea.    You could consider seeing urology for urinary frequency issue.    Recheck your kidney labs in approximately 3 weeks with a nonfasting lab appointment, as you will be increasing the losartan dose.    See me in October 15 in clinic for blood pressure checkup appointment.    Mio Gtz MD, MD        Current Outpatient Medications   Medication Sig Dispense Refill    aspirin (ASA) 81 MG chewable tablet Take 1 tablet (81 mg) by mouth daily Starting tomorrow. 30 tablet 3    famotidine (PEPCID) 40 MG tablet Take 40 mg by mouth as needed      fish oil-omega-3 fatty acids 1000 MG capsule Take 1 g by mouth 2 times daily (with meals) Take after lunch and dinner.      losartan (COZAAR) 25 MG tablet Take 25 mg in the morning and 50 mg in the evening 270 tablet 3    Misc Natural Products (OSTEO BI-FLEX JOINT SHIELD) TABS Take 1 tablet by mouth 2 times daily (with meals) Take after lunch and dinner.      nitroGLYcerin (NITROSTAT) 0.4 MG sublingual tablet One tablet under the tongue every 5 minutes if needed for chest pain. May repeat every 5 minutes for a maximum of 3 doses in 15 minutes\" 25 tablet 3    rosuvastatin (CRESTOR) 40 MG tablet Take 1 tablet (40 mg) by mouth daily 90 tablet 3    vitamin D3 (CHOLECALCIFEROL) 50 mcg (2000 units) tablet Take 50 mcg by mouth every morning       Allergies   Allergen Reactions    Cephalexin Hives and Rash     Social History     Tobacco Use    Smoking status: Never     Passive exposure: Never    Smokeless tobacco: Never   Vaping Use    Vaping status: Never Used   Substance Use Topics    Alcohol use: Yes    Drug use: Never             Subjective   Jose Raul is a 59 year old, presenting for the following health issues:  Follow Up (BP check. )        " 7/31/2024     7:54 AM   Additional Questions   Roomed by Alize RODRIGUEZ   Accompanied by emi     History of Present Illness       Hypertension: He presents for follow up of hypertension.  He does check blood pressure  regularly outside of the clinic. Outside blood pressures have been over 140/90. He follows a low salt diet.     He eats 0-1 servings of fruits and vegetables daily.He consumes 0 sweetened beverage(s) daily.He exercises with enough effort to increase his heart rate 30 to 60 minutes per day.  He exercises with enough effort to increase his heart rate 7 days per week.   He is taking medications regularly.                   Objective    Vitals - Patient Reported  Systolic (Patient Reported): 138  Diastolic (Patient Reported): 82        Physical Exam   General: Alert and no distress //Respiratory: No audible wheeze, cough, or shortness of breath // Psychiatric:  Appropriate affect, tone, and pace of words            Phone call duration: 13 minutes  Signed Electronically by: Mio Gtz MD

## 2024-07-31 NOTE — PATIENT INSTRUCTIONS
Increase the evening losartan to 50 mg.  Continue 25 mg of losartan in the morning.    Proceed with sleep study to evaluate for sleep apnea.    You could consider seeing urology for urinary frequency issue.    Recheck your kidney labs in approximately 3 weeks with a nonfasting lab appointment, as you will be increasing the losartan dose.    See me in October 15 in clinic for blood pressure checkup appointment.

## 2024-08-09 ENCOUNTER — TELEPHONE (OUTPATIENT)
Dept: INTERNAL MEDICINE | Facility: CLINIC | Age: 59
End: 2024-08-09
Payer: COMMERCIAL

## 2024-08-09 NOTE — TELEPHONE ENCOUNTER
Following messages were sent from patient as CRM message. Please address.        I received the form in the mail today, but the date the tests were performed was not entered so my Company will not accept it. Can someone enter the date, fax it again, and email it or mail it to me? My email address is [email or web address].                 Topic: Non-Medical Question.     I recently had a physical exam and I need to send a form to my Company for my benefits. Please complete the attached form and either fax it to [phone] or send it back to me and I'm upload it to my benefits department. Thanks for your help!   Attachments   [employer] form 1.jpg     [employer] form 2.jpg

## 2024-08-12 NOTE — TELEPHONE ENCOUNTER
I have printed forms. We will fill in the blanks needed and then have Dr Gtz sign and fax to number provided on form.

## 2024-09-29 PROBLEM — Z98.61 PERCUTANEOUS TRANSLUMINAL CORONARY ANGIOPLASTY STATUS: Status: RESOLVED | Noted: 2022-08-24 | Resolved: 2024-09-29

## 2024-09-29 PROBLEM — I25.10 CAD (CORONARY ARTERY DISEASE): Chronic | Status: ACTIVE | Noted: 2022-09-09

## 2024-09-29 PROBLEM — E78.5 DYSLIPIDEMIA: Chronic | Status: ACTIVE | Noted: 2022-09-09

## 2024-09-29 ASSESSMENT — SLEEP AND FATIGUE QUESTIONNAIRES
HOW LIKELY ARE YOU TO NOD OFF OR FALL ASLEEP WHILE SITTING AND TALKING TO SOMEONE: WOULD NEVER DOZE
HOW LIKELY ARE YOU TO NOD OFF OR FALL ASLEEP WHILE SITTING AND READING: SLIGHT CHANCE OF DOZING
HOW LIKELY ARE YOU TO NOD OFF OR FALL ASLEEP IN A CAR, WHILE STOPPED FOR A FEW MINUTES IN TRAFFIC: WOULD NEVER DOZE
HOW LIKELY ARE YOU TO NOD OFF OR FALL ASLEEP WHEN YOU ARE A PASSENGER IN A CAR FOR AN HOUR WITHOUT A BREAK: SLIGHT CHANCE OF DOZING
HOW LIKELY ARE YOU TO NOD OFF OR FALL ASLEEP WHILE WATCHING TV: SLIGHT CHANCE OF DOZING
HOW LIKELY ARE YOU TO NOD OFF OR FALL ASLEEP WHILE LYING DOWN TO REST IN THE AFTERNOON WHEN CIRCUMSTANCES PERMIT: MODERATE CHANCE OF DOZING
HOW LIKELY ARE YOU TO NOD OFF OR FALL ASLEEP WHILE SITTING QUIETLY AFTER LUNCH WITHOUT ALCOHOL: WOULD NEVER DOZE
HOW LIKELY ARE YOU TO NOD OFF OR FALL ASLEEP WHILE SITTING INACTIVE IN A PUBLIC PLACE: WOULD NEVER DOZE

## 2024-09-30 ENCOUNTER — VIRTUAL VISIT (OUTPATIENT)
Dept: SLEEP MEDICINE | Facility: CLINIC | Age: 59
End: 2024-09-30
Attending: INTERNAL MEDICINE
Payer: COMMERCIAL

## 2024-09-30 VITALS
SYSTOLIC BLOOD PRESSURE: 141 MMHG | WEIGHT: 215 LBS | HEIGHT: 71 IN | BODY MASS INDEX: 30.1 KG/M2 | DIASTOLIC BLOOD PRESSURE: 76 MMHG

## 2024-09-30 DIAGNOSIS — Z72.820 LACK OF ADEQUATE SLEEP: ICD-10-CM

## 2024-09-30 DIAGNOSIS — G47.19 EXCESSIVE DAYTIME SLEEPINESS: ICD-10-CM

## 2024-09-30 DIAGNOSIS — I10 ESSENTIAL HYPERTENSION: ICD-10-CM

## 2024-09-30 DIAGNOSIS — R53.83 MALAISE AND FATIGUE: ICD-10-CM

## 2024-09-30 DIAGNOSIS — I25.10 ATHEROSCLEROSIS OF CORONARY ARTERY OF NATIVE HEART WITHOUT ANGINA PECTORIS, UNSPECIFIED VESSEL OR LESION TYPE: ICD-10-CM

## 2024-09-30 DIAGNOSIS — R06.89 DYSPNEA AND RESPIRATORY ABNORMALITY: Primary | ICD-10-CM

## 2024-09-30 DIAGNOSIS — R53.81 MALAISE AND FATIGUE: ICD-10-CM

## 2024-09-30 DIAGNOSIS — R06.00 DYSPNEA AND RESPIRATORY ABNORMALITY: Primary | ICD-10-CM

## 2024-09-30 PROCEDURE — 99244 OFF/OP CNSLTJ NEW/EST MOD 40: CPT | Mod: 95 | Performed by: PHYSICIAN ASSISTANT

## 2024-09-30 ASSESSMENT — PAIN SCALES - GENERAL: PAINLEVEL: NO PAIN (0)

## 2024-09-30 NOTE — PATIENT INSTRUCTIONS
"          MY TREATMENT INFORMATION FOR SLEEP APNEA-  Jose Raul Larsen    DOCTOR : JOHNNY Aguilar    Am I having a sleep study at a sleep center?  --->Due to normal delays, you will be contacted within 2-4 weeks to schedule    Am I having a home sleep study?  --->Watch the video for the device you are using:    -/drop off device-   https://www.RapidEnginesube.com/watch?v=yGGFBdELGhk    -Disposable device sent out require phone/computer application-   https://www.RapidEnginesube.com/watch?v=BCce_vbiwxE      Frequently asked questions:  1. What is Obstructive Sleep Apnea (LINETTE)? LINETTE is the most common type of sleep apnea. Apnea means, \"without breath.\"  Apnea is most often caused by narrowing or collapse of the upper airway as muscles relax during sleep.   Almost everyone has occasional apneas. Most people with sleep apnea have had brief interruptions at night frequently for many years.  The severity of sleep apnea is related to how frequent and severe the events are.   2. What are the consequences of LINETTE? Symptoms include: feeling sleepy during the day, snoring loudly, gasping or stopping of breathing, trouble sleeping, and occasionally morning headaches or heartburn at night.  Sleepiness can be serious and even increase the risk of falling asleep while driving. Other health consequences may include development of high blood pressure and other cardiovascular disease in persons who are susceptible. Untreated LINETTE  can contribute to heart disease, stroke and diabetes.   3. What are the treatment options? In most situations, sleep apnea is a lifelong disease that must be managed with daily therapy. Medications are not effective for sleep apnea and surgery is generally not considered until other therapies have been tried. Your treatment is your choice . Continuous Positive Airway (CPAP) works right away and is the therapy that is effective in nearly everyone. An oral device to hold your jaw forward is usually the next most " reliable option. Other options include postioning devices (to keep you off your back), weight loss, and surgery including a tongue pacing device. There is more detail about some of these options below.  4. Are my sleep studies covered by insurance? Although we will request verification of coverage, we advise you also check in advance of the study to ensure there is coverage.    Important tips for those choosing CPAP and similar devices  REMEMBER-IF YOU RECEIVE A CALL FROM  [phone]-->IT IS TO SETUP A DEVICE  For new devices, sign up for device ULISES to monitor your device for your followup visits  We encourage you to utilize the [facility] ulises or website ( https://[employer]-Networks/ ) to monitor your therapy progress and share the data with your healthcare team when you discuss your sleep apnea.                                                    Know your equipment:  CPAP is continuous positive airway pressure that prevents obstructive sleep apnea by keeping the throat from collapsing while you are sleeping. In most cases, the device is  smart  and can slowly self-adjusts if your throat collapses and keeps a record every day of how well you are treated-this information is available to you and your care team.  BPAP is bilevel positive airway pressure that keeps your throat open and also assists each breath with a pressure boost to maintain adequate breathing.  Special kinds of BPAP are used in patients who have inadequate breathing from lung or heart disease. In most cases, the device is  smart  and can slowly self-adjusts to assist breathing. Like CPAP, the device keeps a record of how well you are treated.  Your mask is your connection to the device. You get to choose what feels most comfortable and the staff will help to make sure if fits. Here: are some examples of the different masks that are available: Magnetic mask aids may assist with use but there are safety issues that should be addressed when  considering with magnets* ( see end of discussion).       Key points to remember on your journey with sleep apnea:  Sleep study.  PAP devices often need to be adjusted during a sleep study to show that they are effective and adjusted right.  Good tips to remember: Try wearing just the mask during a quiet time during the day so your body adapts to wearing it. A humidifier is recommended for comfort in most cases to prevent drying of your nose and throat. Allergy medication from your provider may help you if you are having nasal congestion.  Getting settled-in. It takes more than one night for most of us to get used to wearing a mask. Try wearing just the mask during a quiet time during the day so your body adapts to wearing it. A humidifier is recommended for comfort in most cases. Our team will work with you carefully on the first day and will be in contact within 4 days and again at 2 and 4 weeks for advice and remote device adjustments. Your therapy is evaluated by the device each day.   Use it every night. The more you are able to sleep naturally for 7-8 hours, the more likely you will have good sleep and to prevent health risks or symptoms from sleep apnea. Even if you use it 4 hours it helps. Occasionally all of us are unable to use a medical therapy, in sleep apnea, it is not dangerous to miss one night.   Communicate. Call our skilled team on the number provided on the first day if your visit for problems that make it difficult to wear the device. Over 2 out of 3 patients can learn to wear the device long-term with help from our team. Remember to call our team or your sleep providers if you are unable to wear the device as we may have other solutions for those who cannot adapt to mask CPAP therapy. It is recommended that you sleep your sleep provider within the first 3 months and yearly after that if you are not having problems.   Use it for your health. We encourage use of CPAP masks during daytime quiet  periods to allow your face and brain to adapt to the sensation of CPAP so that it will be a more natural sensation to awaken to at night or during naps. This can be very useful during the first few weeks or months of adapting to CPAP though it does not help medically to wear CPAP during wakefulness and  should not be used as a strategy just to meet guidelines.  Take care of your equipment. Make sure you clean your mask and tubing using directions every day and that your filter and mask are replaced as recommended or if they are not working.     *Masks with magnets:  Updated Contraindications  Masks with magnetic components are contraindicated for use by patients where they, or anyone in close physical contact while using the mask, have the following:   Active medical implants that interact with magnets (i.e., pacemakers, implantable cardioverter defibrillators (ICD), neurostimulators, cerebrospinal fluid (CSF) shunts, insulin/infusion pumps)   Metallic implants/objects containing ferromagnetic material (i.e., aneurysm clips/flow disruption devices, embolic coils, stents, valves, electrodes, implants to restore hearing or balance with implanted magnets, ocular implants, metallic splinters in the eye)  Updated Warning  Keep the mask magnets at a safe distance of at least 6 inches (150 mm) away from implants or medical devices that may be adversely affected by magnetic interference. This warning applies to you or anyone in close physical contact with your mask. The magnets are in the frame and lower headgear clips, with a magnetic field strength of up to 400mT. When worn, they connect to secure the mask but may inadvertently detach while asleep.  Implants/medical devices, including those listed within contraindications, may be adversely affected if they change function under external magnetic fields or contain ferromagnetic materials that attract/repel to magnetic fields (some metallic implants, e.g., contact lenses  with metal, dental implants, metallic cranial plates, screws, daron hole covers, and bone substitute devices). Consult your physician and  of your implant / other medical device for information on the potential adverse effects of magnetic fields.    BESIDES CPAP, WHAT OTHER THERAPIES ARE THERE?    Positioning Device  Positioning devices are generally used when sleep apnea is mild and only occurs on your back.This example shows a pillow that straps around the waist. It may be appropriate for those whose sleep study shows milder sleep apnea that occurs primarily when lying flat on one's back. Preliminary studies have shown benefit but effectiveness at home may need to be verified by a home sleep test. These devices are generally not covered by medical insurance.  Examples of devices that maintain sleeping on the back to prevent snoring and mild sleep apnea.    Belt type body positioner  http://[employer]/    Electronic reminder  http://nightshifttherapy.[employer]m Systems/            Oral Appliance  What is oral appliance therapy?  An oral appliance device fits on your teeth at night like a retainer used after having braces. The device is made by a specialized dentist and requires several visits over 1-2 months before a manufactured device is made to fit your teeth and is adjusted to prevent your sleep apnea. Once an oral device is working properly, snoring should be improved. A home sleep test may be recommended at that time if to determine whether the sleep apnea is adequately treated.       Some things to remember:  -Oral devices are often, but not always, covered by your medical insurance. Be sure to check with your insurance provider.   -If you are referred for oral therapy, you will be given a list of specialized dentists to consider or you may choose to visit the Web site of the American Academy of Dental Sleep Medicine  -Oral devices are less likely to work if you have severe sleep apnea or are extremely  overweight.     More detailed information  An oral appliance is a small acrylic device that fits over the upper and lower teeth  (similar to a retainer or a mouth guard). This device slightly moves jaw forward, which moves the base of the tongue forward, opens the airway, improves breathing for effective treat snoring and obstructive sleep apnea in perhaps 7 out of 10 people .  The best working devices are custom-made by a dental device  after a mold is made of the teeth 1, 2, 3.  When is an oral appliance indicated?  Oral appliance therapy is recommended as a first-line treatment for patients with primary snoring, mild sleep apnea, and for patients with moderate sleep apnea who prefer appliance therapy to use of CPAP4, 5. Severity of sleep apnea is determined by sleep testing and is based on the number of respiratory events per hour of sleep.   How successful is oral appliance therapy?  The success rate of oral appliance therapy in patients with mild sleep apnea is 75-80% while in patients with moderate sleep apnea it is 50-70%. The chance of success in patients with severe sleep apnea is 40-50%. The research also shows that oral appliances have a beneficial effect on the cardiovascular health of LINETTE patients at the same magnitude as CPAP therapy7.  Oral appliances should be a second-line treatment in cases of severe sleep apnea, but if not completely successful then a combination therapy utilizing CPAP plus oral appliance therapy may be effective. Oral appliances tend to be effective in a broad range of patients although studies show that the patients who have the highest success are females, younger patients, those with milder disease, and less severe obesity. 3, 6.   Finding a dentist that practices dental sleep medicine  Specific training is available through the American Academy of Dental Sleep Medicine for dentists interested in working in the field of sleep. To find a dentist who is educated in  the field of sleep and the use of oral appliances, near you, visit the Web site of the American Academy of Dental Sleep Medicine.    References  1. Brown, et al. Objectively measured vs self-reported compliance during oral appliance therapy for sleep-disordered breathing. Chest 2013; 144(5): 3294-0237.  2. Conor et al. Objective measurement of compliance during oral appliance therapy for sleep-disordered breathing. Thorax 2013; 68(1): 91-96.  3. Ede et al. Mandibular advancement devices in 620 men and women with LINETTE and snoring: tolerability and predictors of treatment success. Chest 2004; 125: 9982-0036.  4. Ivana, et al. Oral appliances for snoring and LINETTE: a review. Sleep 2006; 29: 244-262.  5. Nnamdi et al. Oral appliance treatment for LINETTE: an update. J Clin Sleep Med 2014; 10(2): 215-227.  6. Rainer et al. Predictors of OSAH treatment outcome. J Dent Res 2007; 86: 5435-9072.      Weight Loss:   Your Body mass index is 29.99 kg/m .    Being overweight does not necessarily mean you will have health consequences.  Those who have BMI over 35 or over 27 with existing medical conditions carries greater risk.   Weight loss decreases severity of sleep apnea in most people with obesity. For those with mild obesity who have developed snoring with weight gain, even 15-30 pound weight loss can improve and occasionally milder eliminate sleep apnea.  Structured and life-long dietary and health habits are necessary to lose weight and keep healthier weight levels.     The Comprehensive Weight loss program offers all aspects of weight loss strategies including two Non-Surgical Weight Loss Programs: Medical Weight Management and our 24 Week Healthy Lifestyle Program:    Medical Weight Management: You will meet with a Medical Weight Management Provider, as well as a Registered Dietician. The program may include medication therapy, dietary education, recommended exercise and physical therapy programs,  monthly support group meetings, and possible psychological counseling. Follow up visits with the provider or dietician are scheduled based on your progress and needs.    24 Week Healthy Lifestyle Program: This unique program is designed to give you the support of weekly appointments and activities thru a 24-week period. It may include all of the components of the basic program (above), with the addition of 11 individual Health  Visits, 24-week access to the [employer] website for over 700 online classes, and monthly support group meetings. This program has an out-of-pocket expense of $499 to cover the items that can not be billed to insurance (health coaches and [employer] access), and is non-refundable/non-transferable (you may be able to use a Health Savings Account; ask your HSA provider). There may be an optional meal replacement plan prescribed as well.   Surgical management achieves meaningful long-term weight loss and improvement in health risks in most patients with more severe obesity.      Sleep Apnea Surgery:    Surgery for obstructive sleep apnea is considered generally only when other therapies fail to work. Surgery may be discussed with you if you are having a difficult time tolerating CPAP and or when there is an abnormal structure that requires surgical correction.  Nose and throat surgeries often enlarge the airway to prevent collapse.  Most of these surgeries create pain for 1-2 weeks and up to half of the most common surgeries are not effective throughout life.  You should carefully discuss the benefits and drawbacks to surgery with your sleep provider and surgeon to determine if it is the best solution for you.   More information  Surgery for LINETTE is directed at areas that are responsible for narrowing or complete obstruction of the airway during sleep.  There are a wide range of procedures available to enlarge and/or stabilize the airway to prevent blockage of breathing in the three major  areas where it can occur: the palate, tongue, and nasal regions.  Successful surgical treatment depends on the accurate identification of the factors responsible for obstructive sleep apnea in each person.  A personalized approach is required because there is no single treatment that works well for everyone.  Because of anatomic variation, consultation with an examination by a sleep surgeon is a critical first step in determining what surgical options are best for each patient.  In some cases, examination during sedation may be recommended in order to guide the selection of procedures.  Patients will be counseled about risks and benefits as well as the typical recovery course after surgery. Surgery is typically not a cure for a person s LINETTE.  However, surgery will often significantly improve one s LINETTE severity (termed  success rate ).  Even in the absence of a cure, surgery will decrease the cardiovascular risk associated with OSA7; improve overall quality of life8 (sleepiness, functionality, sleep quality, etc).      Palate Procedures:  Patients with LINETTE often have narrowing of their airway in the region of their tonsils and uvula.  The goals of palate procedures are to widen the airway in this region as well as to help the tissues resist collapse.  Modern palate procedure techniques focus on tissue conservation and soft tissue rearrangement, rather than tissue removal.  Often the uvula is preserved in this procedure. Residual sleep apnea is common in patient after pharyngoplasty with an average reduction in sleep apnea events of 33%2.      Tongue Procedures:  ExamWhile patients are awake, the muscles that surround the throat are active and keep this region open for breathing. These muscles relax during sleep, allowing the tongue and other structures to collapse and block breathing.  There are several different tongue procedures available.  Selection of a tongue base procedure depends on characteristics seen on  physical exam.  Generally, procedures are aimed at removing bulky tissues in this area or preventing the back of the tongue from falling back during sleep.  Success rates for tongue surgery range from 50-62%3.    Hypoglossal Nerve Stimulation:  Hypoglossal nerve stimulation has recently received approval from the [place] Food and Drug Administration for the treatment of obstructive sleep apnea.  This is based on research showing that the system was safe and effective in treating sleep apnea6.  Results showed that the median AHI score decreased 68%, from 29.3 to 9.0. This therapy uses an implant system that senses breathing patterns and delivers mild stimulation to airway muscles, which keeps the airway open during sleep.  The system consists of three fully implanted components: a small generator (similar in size to a pacemaker), a breathing sensor, and a stimulation lead.  Using a small handheld remote, a patient turns the therapy on before bed and off upon awakening.    Candidates for this device must be greater than 18 years of age, have moderate to severe obstructive sleep apnea with less than 25% central events  (AHI between 15-65), BMI less than 35, have tried CPAP/oral appliance for at least 8 weeks without success, and have appropriate upper airway anatomy (determined by a sleep endoscopy performed by Dr. Sharif Brand or Dr. Daniel Matson).    Nasal Procedures:  Nasal obstruction can interfere with nasal breathing during the day and night.  Studies have shown that relief of nasal obstruction can improve the ability of some patients to tolerate positive airway pressure therapy for obstructive sleep apnea1.  Treatment options include medications such as nasal saline, topical corticosteroid and antihistamine sprays, and oral medications such as antihistamines or decongestants. Non-surgical treatments can include external nasal dilators for selected patients. If these are not successful by themselves,  surgery can improve the nasal airway either alone or in combination with these other options.        Combination Procedures:  Combination of surgical procedures and other treatments may be recommended, particularly if patients have more than one area of narrowing or persistent positional disease.  The success rate of combination surgery ranges from 66-80%2,3.    References  Randy CULLEN. The Role of the Nose in Snoring and Obstructive Sleep Apnoea: An Update.  Eur Arch Otorhinolaryngol. 2011; 268: 1365-73.   Jenae SM; Adebayo JA; Danni JR; Pallanch JF; Mariana MB; Lesly SG; Jason ROWLEY. Surgical modifications of the upper airway for obstructive sleep apnea in adults: a systematic review and meta-analysis. SLEEP 2010;33(10):2186-7053. Aury THOMAS. Hypopharyngeal surgery in obstructive sleep apnea: an evidence-based medicine review.  Arch Otolaryngol Head Neck Surg. 2006 Feb;132(2):206-13.  Humberto YH1, Dylon Y, Aries KARLEY. The efficacy of anatomically based multilevel surgery for obstructive sleep apnea. Otolaryngol Head Neck Surg. 2003 Oct;129(4):327-35.  Aury THOMAS, Goldberg A. Hypopharyngeal Surgery in Obstructive Sleep Apnea: An Evidence-Based Medicine Review. Arch Otolaryngol Head Neck Surg. 2006 Feb;132(2):206-13.  Saniya KINSEY et al. Upper-Airway Stimulation for Obstructive Sleep Apnea.  N Engl J Med. 2014 Jan 9;370(2):139-49.  Marilee Y et al. Increased Incidence of Cardiovascular Disease in Middle-aged Men with Obstructive Sleep Apnea. Am J Respir Crit Care Med; 2002 166: 159-165  Amaya EM et al. Studying Life Effects and Effectiveness of Palatopharyngoplasty (SLEEP) study: Subjective Outcomes of Isolated Uvulopalatopharyngoplasty. Otolaryngol Head Neck Surg. 2011; 144: 623-631.        WHAT IF I ONLY HAVE SNORING?    Mandibular advancement devices, lateral sleep positioning, long-term weight loss and treatment of nasal allergies have been shown to improve snoring.  Exercising tongue muscles with a game  (https://[employer]k.[employer]/us/ulises/soundly-reduce-snoring/vf7759797394) or stimulating the tongue during the day with a device (https://doi.org/10.3390/pxb45915115) have improved snoring in some individuals.  https://www.Medxnote.[employer]/  https://www.sleepfoundation.org/best-anti-snoring-mouthpieces-and-mouthguards    Remember to Drive Safe... Drive Alive     Sleep health profoundly affects your health, mood, and your safety.  Thirty three percent of the population (one in three of us) is not getting enough sleep and many have a sleep disorder. Not getting enough sleep or having an untreated / undertreated sleep condition may make us sleepy without even knowing it. In fact, our driving could be dramatically impaired due to our sleep health. As your provider, here are some things I would like you to know about driving:     Here are some warning signs for impairment and dangerous drowsy driving:              -Having been awake more than 16 hours               -Looking tired               -Eyelid drooping              -Head nodding (it could be too late at this point)              -Driving for more than 30 minutes     Some things you could do to make the driving safer if you are experiencing some drowsiness:              -Stop driving and rest              -Call for transportation              -Make sure your sleep disorder is adequately treated     Some things that have been shown NOT to work when experiencing drowsiness while driving:              -Turning on the radio              -Opening windows              -Eating any  distracting  /  entertaining  foods (e.g., sunflower seeds, candy, or any other)              -Talking on the phone      Your decision may not only impact your life, but also the life of others. Please, remember to drive safe for yourself and all of us.           Your Body mass index is 29.99 kg/m .  Weight management is a personal decision.  If you are interested in exploring weight loss strategies,  the following discussion covers the approaches that may be successful. Body mass index (BMI) is one way to tell whether you are at a healthy weight, overweight, or obese. It measures your weight in relation to your height.  A BMI of 18.5 to 24.9 is in the healthy range. A person with a BMI of 25 to 29.9 is considered overweight, and someone with a BMI of 30 or greater is considered obese. More than two-thirds of American adults are considered overweight or obese.  Being overweight or obese increases the risk for further weight gain. Excess weight may lead to heart disease and diabetes.  Creating and following plans for healthy eating and physical activity may help you improve your health.  Weight control is part of healthy lifestyle and includes exercise, emotional health, and healthy eating habits. Careful eating habits lifelong are the mainstay of weight control. Though there are significant health benefits from weight loss, long-term weight loss with diet alone may be very difficult to achieve- studies show long-term success with dietary management in less than 10% of people. Attaining a healthy weight may be especially difficult to achieve in those with severe obesity. In some cases, medications, devices and surgical management might be considered.  What can you do?  If you are overweight or obese and are interested in methods for weight loss, you should discuss this with your provider.   Consider reducing daily calorie intake by 500 calories.   Keep a food journal.   Avoiding skipping meals, consider cutting portions instead.    Diet combined with exercise helps maintain muscle while optimizing fat loss. Strength training is particularly important for building and maintaining muscle mass. Exercise helps reduce stress, increase energy, and improves fitness. Increasing exercise without diet control, however, may not burn enough calories to loose weight.     Start walking three days a week 10-20 minutes at a  time  Work towards walking thirty minutes five days a week   Eventually, increase the speed of your walking for 1-2 minutes at time    In addition, we recommend that you review healthy lifestyles and methods for weight loss available through the National Institutes of Health patient information sites:  http://win.niddk.nih.gov/publications/index.htm    And look into health and wellness programs that may be available through your health insurance provider, employer, local community center, or malini club.

## 2024-09-30 NOTE — PROGRESS NOTES
Virtual Visit Details    Type of service:  Video Visit     Originating Location (pt. Location): Home    Distant Location (provider location):  On-site  Platform used for Video Visit: [facility]    Outpatient Sleep Medicine Consultation:      Name: Jose Raul Larsen MRN# [id number]   Age: 59 year old YOB: 1965     Date of Consultation: September 30, 2024  Consultation is requested by: Mio Gtz MD  [address] [place] [address] Mio Gtz  Primary care provider: Mio Gtz       Reason for Sleep Consult:     Jose Raul Larsen is sent by Mio Gtz for a sleep consultation regarding daytime sleepiness.    Patient s Reason for visit  Jose Raul Larsen main reason for visit: Waking up many times during the night to urinate. Consistently waking up at about 3:00 am.  Patient states problem(s) started: About a year ago.  Jose Raul Larsen's goals for this visit: Learn methods for staying asleep all night.           Assessment and Plan:     Impression:  Patient has features and risk factors for possible obstructive sleep apnea including:  nocturia, morning headaches,  some snoring, bruxism, daytime fatigue/sleepiness (ESS 5) and co-morbid HTN and CAD. The STOP-BANG score is 5. The pathophysiology, diagnosis and treatment of LINETTE was discussed and a handout was provided.   Plan:    1. Schedule a Home Sleep Apnea Testing to evaluate for obstructive sleep apnea.    2. Recommend weight management.      Summary Recommendations:  Orders Placed This Encounter   Procedures    HST-Home Sleep Apnea Test - Noxturnal Returnable       Summary Counseling:    Sleep Testing Reviewed  Obstructive Sleep Apnea Reviewed  Complications of Untreated Sleep Apnea Reviewed      Medical Decision-making:   Educational materials provided in instructions    Total time spent reviewing medical records, history and physical examination, review of previous testing and interpretation as well as documentation on this date:45  minutes    CC: Mio Gtz          History of Present Illness:     Past Sleep Evaluations: NA    SLEEP-WAKE SCHEDULE:     Work/School Days: Patient goes to school/work: Yes   Usually gets into bed at 9:30  Takes patient about About 5 minutes. to fall asleep  Has trouble falling asleep Hardly ever. nights per week  Wakes up in the middle of the night At least three times, at most six times. times.  Wakes up due to External stimuli (bed partner, pets, noise, etc);Use the bathroom;Uncertain  He has trouble falling back asleep Seven times a week.   It usually takes Sometimes a few minutes, other times several hours. to get back to sleep  Patient is usually up at 7:00 am  Uses alarm: Yes    Weekends/Non-work Days/All Other Days:  Usually gets into bed at 10:00   Takes patient about Five minutes to fall asleep  Patient is usually up at 7:00 am  Uses alarm: No    Sleep Need  Patient gets  Six to seven hours. sleep on average    Patient thinks he needs about Eight hours. sleep    Jose Raul Larsen prefers to sleep in this position(s): Back;Side;Head Elevated   Patient states they do the following activities in bed: Read;Watch TV;Use phone, computer, or tablet    Naps  Patient takes a purposeful nap Five days. times a week and naps are usually One hour or less. in duration  He feels better after a nap: Yes  He dozes off unintentionally Never. days per week  Patient has had a driving accident or near-miss due to sleepiness/drowsiness: No      SLEEP DISRUPTIONS:    Breathing/Snoring  Patient snores: Yes  Other people complain about his snoring: No  Patient has been told he stops breathing in his sleep:No  He has issues with the following: Morning headaches;Getting up to urinate more than once    Movement:  Patient gets pain, discomfort, with an urge to move:  No  It happens when he is resting:  No  It happens more at night:  No  Patient has been told he kicks his legs at night:  No     Behaviors in Sleep:  Jose Raul Larsen  has experienced the following behaviors while sleeping: Teeth grinding (Uses a mouthguard)  He has experienced sudden muscle weakness during the day: No      Is there anything else you would like your sleep provider to know:        CAFFEINE AND OTHER SUBSTANCES:    Patient consumes caffeinated beverages per day:  About 1/2 a can per day.  Last caffeine use is usually: Noon.  List of any prescribed or over the counter stimulants that patient takes: None.  List of any prescribed or over the counter sleep medication patient takes: I sometimes take melotonin.  List of previous sleep medications that patient has tried:    Patient drinks alcohol to help them sleep: No  Patient drinks alcohol near bedtime: No    Family History:  Patient has a family member been diagnosed with a sleep disorder: No            SCALES:    EPWORTH SLEEPINESS SCALE         9/29/2024    12:04 PM    Holt Sleepiness Scale ( LARA Wu  [id number]<br>ESS - [place]/English - Final version - 21 Nov 07 - [facility] [place].)   Sitting and reading Slight chance of dozing   Watching TV Slight chance of dozing   Sitting, inactive in a public place (e.g. a theatre or a meeting) Would never doze   As a passenger in a car for an hour without a break Slight chance of dozing   Lying down to rest in the afternoon when circumstances permit Moderate chance of dozing   Sitting and talking to someone Would never doze   Sitting quietly after a lunch without alcohol Would never doze   In a car, while stopped for a few minutes in traffic Would never doze   Holt Score (MC) 5   Holt Score (Sleep) 5         INSOMNIA SEVERITY INDEX (SHIRA)          9/29/2024    11:53 AM   Insomnia Severity Index (SHIRA)   Difficulty falling asleep 0   Difficulty staying asleep 2   Problems waking up too early 2   How SATISFIED/DISSATISFIED are you with your CURRENT sleep pattern? 2   How NOTICEABLE to others do you think your sleep problem is in terms of impairing the quality of your  "life? 1   How WORRIED/DISTRESSED are you about your current sleep problem? 2   To what extent do you consider your sleep problem to INTERFERE with your daily functioning (e.g. daytime fatigue, mood, ability to function at work/daily chores, concentration, memory, mood, etc.) CURRENTLY? 1   SHIRA Total Score 10       Guidelines for Scoring/Interpretation:  Total score categories:  0-7 = No clinically significant insomnia   8-14 = Subthreshold insomnia   15-21 = Clinical insomnia (moderate severity)  22-28 = Clinical insomnia (severe)  Used via courtesy of www.Endoventionealth.va.gov with permission from Joe Jaramillo PhD., [facility]      STOP BANG         9/30/2024     7:38 AM   STOP BANG Questionnaire (  2008, the American Society of Anesthesiologists, Inc. [employer]   B/P Clinic: 141/76   BMI Clinic: 29.99         GAD7         No data to display                  CAGE-AID         No data to display                CAGE-AID reprinted with permission from the [place] Medical Journal, LAZARA Wren. and LYSSA Olguin, \"Conjoint screening questionnaires for alcohol and drug abuse\" [place] Medical Journal 94: 135-140, 1995.      PATIENT HEALTH QUESTIONNAIRE-9 (PHQ - 9)         No data to display                Developed by Manda Baltazar, Yaneth Day, Doroteo Butterfield and colleagues, with an educational eden from [employer] No permission required to reproduce, translate, display or distribute.        Allergies:    Allergies   Allergen Reactions    Cephalexin Hives and Rash       Medications:    Current Outpatient Medications   Medication Sig Dispense Refill    aspirin (ASA) 81 MG chewable tablet Take 1 tablet (81 mg) by mouth daily Starting tomorrow. 30 tablet 3    famotidine (PEPCID) 40 MG tablet Take 40 mg by mouth as needed      fish oil-omega-3 fatty acids 1000 MG capsule Take 1 g by mouth 2 times daily (with meals) Take after lunch and dinner.      losartan (COZAAR) 25 MG tablet " "Take 25 mg in the morning and 50 mg in the evening 270 tablet 3    Misc Natural Products (OSTEO BI-FLEX JOINT SHIELD) TABS Take 1 tablet by mouth 2 times daily (with meals) Take after lunch and dinner.      nitroGLYcerin (NITROSTAT) 0.4 MG sublingual tablet One tablet under the tongue every 5 minutes if needed for chest pain. May repeat every 5 minutes for a maximum of 3 doses in 15 minutes\" 25 tablet 3    rosuvastatin (CRESTOR) 40 MG tablet Take 1 tablet (40 mg) by mouth daily 90 tablet 3    vitamin D3 (CHOLECALCIFEROL) 50 mcg (2000 units) tablet Take 50 mcg by mouth every morning         Problem List:  Patient Active Problem List    Diagnosis Date Noted    CAD (coronary artery disease) 09/09/2022     Priority: Medium     He had an MI during COVID August 2022. Circumflex stent.       Dyslipidemia 09/09/2022     Priority: Medium    NSTEMI (non-ST elevated myocardial infarction) (H)      Priority: Medium    Lung abscess (H) 08/21/2022     Priority: Medium    Vitamin D Deficiency      Priority: Medium     Created by Conversion  Replacement Utility updated for latest IMO load        Essential hypertension      Priority: Medium     Created by Conversion  Replacement Utility updated for latest IMO load        Allergic Urticaria      Priority: Medium     Created by Conversion        Obesity      Priority: Medium                 Past Medical/Surgical History:  Past Medical History:   Diagnosis Date    Heart disease     Hypertension     Paronychia Of The Finger     Created by Conversion         Percutaneous transluminal coronary angioplasty status 08/24/2022     Past Surgical History:   Procedure Laterality Date    CV CORONARY ANGIOGRAM N/A 08/24/2022    Procedure: CV CORONARY ANGIOGRAM;  Surgeon: Alize Ortega MD;  Location[facility] CV    CV LEFT HEART CATH N/A 08/24/2022    Procedure: Left Heart Catheterization;  Surgeon: Alize Ortega MD;  Location: [facility] LAB CV    CV PCI STENT DRUG ELUTING N/A " 08/24/2022    Procedure: Percutaneous Coronary Intervention Stent;  Surgeon: Alize Ortega MD;  Location: [facility] LAB CV    HERNIA REPAIR      SOFT TISSUE SURGERY      ZZC APPENDECTOMY      Description: Appendectomy;  Recorded: 02/19/2010;       Social History:  Social History     Socioeconomic History    Marital status:      Spouse name: Not on file    Number of children: Not on file    Years of education: Not on file    Highest education level: Not on file   Occupational History    Occupation: Insurance Compliance   Tobacco Use    Smoking status: Never     Passive exposure: Never    Smokeless tobacco: Never   Vaping Use    Vaping status: Never Used   Substance and Sexual Activity    Alcohol use: Yes    Drug use: Never    Sexual activity: Yes     Partners: Female     Birth control/protection: Male Surgical   Other Topics Concern    Parent/sibling w/ CABG, MI or angioplasty before 65F 55M? No   Social History Narrative    Not on file     Social Determinants of Health     Financial Resource Strain: Low Risk  (7/7/2024)    Financial Resource Strain     Within the past 12 months, have you or your family members you live with been unable to get utilities (heat, electricity) when it was really needed?: No   Food Insecurity: Low Risk  (7/7/2024)    Food Insecurity     Within the past 12 months, did you worry that your food would run out before you got money to buy more?: No     Within the past 12 months, did the food you bought just not last and you didn t have money to get more?: No   Transportation Needs: Low Risk  (7/7/2024)    Transportation Needs     Within the past 12 months, has lack of transportation kept you from medical appointments, getting your medicines, non-medical meetings or appointments, work, or from getting things that you need?: No   Physical Activity: Sufficiently Active (7/7/2024)    Exercise Vital Sign     Days of Exercise per Week: 7 days     Minutes of Exercise per Session: 50 min    Stress: No Stress Concern Present (7/7/2024)    [employer] [place] [employer] - Occupational Stress Questionnaire     Feeling of Stress : Only a little   Social Connections: Unknown (7/7/2024)    Social Connection and Isolation Panel [NHANES]     Frequency of Communication with Friends and Family: Not on file     Frequency of Social Gatherings with Friends and Family: Twice a week     Attends Episcopal Services: Not on file     Active Member of Clubs or Organizations: Not on file     Attends Club or Organization Meetings: Not on file     Marital Status: Not on file   Interpersonal Safety: Low Risk  (7/12/2024)    Interpersonal Safety     Do you feel physically and emotionally safe where you currently live?: Yes     Within the past 12 months, have you been hit, slapped, kicked or otherwise physically hurt by someone?: No     Within the past 12 months, have you been humiliated or emotionally abused in other ways by your partner or ex-partner?: No   Housing Stability: Low Risk  (7/7/2024)    Housing Stability     Do you have housing? : Yes     Are you worried about losing your housing?: No       Family History:  Family History   Problem Relation Age of Onset    Diabetes Mother     Hypertension Mother     Hyperlipidemia Mother     Breast Cancer Mother     Diabetes Father     Hypertension Father     Hyperlipidemia Father     Cerebrovascular Disease Father        Review of Systems:  A complete review of systems reviewed by me is negative with the exeption of what has been mentioned in the history of present illness.  In the last TWO WEEKS have you experienced any of the following symptoms?  Fevers: No  Night Sweats: No  Weight Gain: No  Pain at Night: No  Double Vision: No  Changes in Vision: No  Difficulty Breathing through Nose: Yes  Sore Throat in Morning: No  Dry Mouth in the Morning: No  Shortness of Breath Lying Flat: No  Shortness of Breath With Activity: No  Awakening with Shortness of Breath:  "No  Increased Cough: No  Heart Racing at Night: No  Swelling in Feet or Legs: No  Diarrhea at Night: No  Heartburn at Night: No  Urinating More than Once at Night: Yes  Losing Control of Urine at Night: No  Joint Pains at Night: No  Headaches in Morning: Yes  Weakness in Arms or Legs: No  Depressed Mood: No  Anxiety: No     Physical Examination:  Vitals: BP (!) 141/76   Ht 1.803 m (5' 11\")   Wt 97.5 kg (215 lb)   BMI 29.99 kg/m    BMI= Body mass index is 29.99 kg/m .           GENERAL APPEARANCE: alert and no distress  EYES: Eyes grossly normal to inspection  NECK: no asymmetry, masses, or scars  RESP: breathing is non-labored   NEURO: mentation intact and speech normal  PSYCH: affect normal/bright  Mallampati Class:   Tonsillar Stage:          Data: All pertinent previous laboratory data reviewed     Recent Labs   Lab Test 07/12/24  0857 01/15/24  1155    140   POTASSIUM 4.3 4.7   CHLORIDE 102 103   CO2 28 24   ANIONGAP 10 13   GLC 97 85   BUN 17.5 16.8   CR 1.27* 1.15   LEAH 9.9 10.1*       Recent Labs   Lab Test 07/12/24  0857   WBC 6.9   RBC 5.63   HGB 17.0   HCT 49.3   MCV 88   MCH 30.2   MCHC 34.5   RDW 12.9          Recent Labs   Lab Test 07/12/24  0857   PROTTOTAL 7.8   ALBUMIN 4.5   BILITOTAL 2.8*   ALKPHOS 54   AST 27   ALT 19       Chest CT:   CT Chest w/o Contrast 01/26/2024    Narrative  EXAM: CT CHEST W/O CONTRAST  LOCATION: [facility]  DATE: 1/26/2024    INDICATION: Follow up on improving granulomatous nodularity in the right upper lobe in February 2023 CT scan  COMPARISON: CT chest 02/02/2023  TECHNIQUE: CT chest without IV contrast. Multiplanar reformats were obtained. Dose reduction techniques were used.  CONTRAST: None.    FINDINGS:  LUNGS AND PLEURA: No significant change in the right upper lobe 14 mm nodule, which is sequela of the previously seen cavitary lesion on 09/08/2022. The numerous adjacent clusters of nodules are also unchanged. No new or " worrisome nodules. No areas of  consolidation. No pleural effusion.    MEDIASTINUM/AXILLAE: Normal heart size. No pericardial effusion. No thoracic lymphadenopathy.    CORONARY ARTERY CALCIFICATION: Mild with stents.    UPPER ABDOMEN: No significant finding.    MUSCULOSKELETAL: Unremarkable.    Impression  IMPRESSION:  Stable chest with sequela of previous granulomatous infection in the right upper lobe.        JOHNNY Aguilar 9/30/2024

## 2024-09-30 NOTE — NURSING NOTE
Current patient location: [address] [place] [id number]    Is the patient currently in the state of [place]? YES    Visit mode:VIDEO    If the visit is dropped, the patient can be reconnected by: VIDEO VISIT: Text to cell phone:   Telephone Information:   Mobile [phone]       Will anyone else be joining the visit? NO  (If patient encounters technical issues they should call [phone])    How would you like to obtain your AVS? MyChart    Are changes needed to the allergy or medication list? No    Are refills needed on medications prescribed by this physician? NO    Rooming Documentation:  Questionnaire(s) completed    Reason for visit: Consult    Pal CALI

## 2025-01-22 ASSESSMENT — SLEEP AND FATIGUE QUESTIONNAIRES
HOW LIKELY ARE YOU TO NOD OFF OR FALL ASLEEP WHILE SITTING AND READING: SLIGHT CHANCE OF DOZING
HOW LIKELY ARE YOU TO NOD OFF OR FALL ASLEEP IN A CAR, WHILE STOPPED FOR A FEW MINUTES IN TRAFFIC: WOULD NEVER DOZE
HOW LIKELY ARE YOU TO NOD OFF OR FALL ASLEEP WHILE SITTING INACTIVE IN A PUBLIC PLACE: WOULD NEVER DOZE
HOW LIKELY ARE YOU TO NOD OFF OR FALL ASLEEP WHILE LYING DOWN TO REST IN THE AFTERNOON WHEN CIRCUMSTANCES PERMIT: MODERATE CHANCE OF DOZING
HOW LIKELY ARE YOU TO NOD OFF OR FALL ASLEEP WHILE SITTING AND TALKING TO SOMEONE: WOULD NEVER DOZE
HOW LIKELY ARE YOU TO NOD OFF OR FALL ASLEEP WHILE WATCHING TV: SLIGHT CHANCE OF DOZING
HOW LIKELY ARE YOU TO NOD OFF OR FALL ASLEEP WHEN YOU ARE A PASSENGER IN A CAR FOR AN HOUR WITHOUT A BREAK: WOULD NEVER DOZE
HOW LIKELY ARE YOU TO NOD OFF OR FALL ASLEEP WHILE SITTING QUIETLY AFTER LUNCH WITHOUT ALCOHOL: WOULD NEVER DOZE

## 2025-01-23 ENCOUNTER — OFFICE VISIT (OUTPATIENT)
Dept: SLEEP MEDICINE | Facility: CLINIC | Age: 60
End: 2025-01-23
Attending: PHYSICIAN ASSISTANT
Payer: COMMERCIAL

## 2025-01-23 DIAGNOSIS — I10 ESSENTIAL HYPERTENSION: ICD-10-CM

## 2025-01-23 DIAGNOSIS — R53.83 MALAISE AND FATIGUE: ICD-10-CM

## 2025-01-23 DIAGNOSIS — R06.00 DYSPNEA AND RESPIRATORY ABNORMALITY: ICD-10-CM

## 2025-01-23 DIAGNOSIS — I25.10 ATHEROSCLEROSIS OF CORONARY ARTERY OF NATIVE HEART WITHOUT ANGINA PECTORIS, UNSPECIFIED VESSEL OR LESION TYPE: ICD-10-CM

## 2025-01-23 DIAGNOSIS — R06.89 DYSPNEA AND RESPIRATORY ABNORMALITY: ICD-10-CM

## 2025-01-23 DIAGNOSIS — R53.81 MALAISE AND FATIGUE: ICD-10-CM

## 2025-01-23 DIAGNOSIS — Z72.820 LACK OF ADEQUATE SLEEP: ICD-10-CM

## 2025-01-23 DIAGNOSIS — G47.19 EXCESSIVE DAYTIME SLEEPINESS: ICD-10-CM

## 2025-01-23 NOTE — PROGRESS NOTES
Pt is completing a home sleep test. Pt was instructed on how to put on the Noxturnal T3 device and associated equipment before going to bed and given the opportunity to practice putting it on before leaving the sleep center. Pt was reminded to bring the home sleep test kit back to the center tomorrow, at agreed upon time for download and reporting.   Neck circumference: 45 CM / 17.75 inches.   denies pain/discomfort

## 2025-01-24 ENCOUNTER — DOCUMENTATION ONLY (OUTPATIENT)
Dept: SLEEP MEDICINE | Facility: CLINIC | Age: 60
End: 2025-01-24
Attending: PHYSICIAN ASSISTANT
Payer: COMMERCIAL

## 2025-02-19 PROBLEM — J85.2 LUNG ABSCESS (H): Status: RESOLVED | Noted: 2022-08-21 | Resolved: 2025-02-19

## 2025-03-11 ASSESSMENT — SLEEP AND FATIGUE QUESTIONNAIRES
HOW LIKELY ARE YOU TO NOD OFF OR FALL ASLEEP WHILE SITTING QUIETLY AFTER LUNCH WITHOUT ALCOHOL: SLIGHT CHANCE OF DOZING
HOW LIKELY ARE YOU TO NOD OFF OR FALL ASLEEP WHILE SITTING AND READING: SLIGHT CHANCE OF DOZING
HOW LIKELY ARE YOU TO NOD OFF OR FALL ASLEEP WHILE SITTING AND TALKING TO SOMEONE: WOULD NEVER DOZE
HOW LIKELY ARE YOU TO NOD OFF OR FALL ASLEEP WHILE WATCHING TV: SLIGHT CHANCE OF DOZING
HOW LIKELY ARE YOU TO NOD OFF OR FALL ASLEEP IN A CAR, WHILE STOPPED FOR A FEW MINUTES IN TRAFFIC: WOULD NEVER DOZE
HOW LIKELY ARE YOU TO NOD OFF OR FALL ASLEEP WHILE SITTING INACTIVE IN A PUBLIC PLACE: WOULD NEVER DOZE
HOW LIKELY ARE YOU TO NOD OFF OR FALL ASLEEP WHILE LYING DOWN TO REST IN THE AFTERNOON WHEN CIRCUMSTANCES PERMIT: MODERATE CHANCE OF DOZING
HOW LIKELY ARE YOU TO NOD OFF OR FALL ASLEEP WHEN YOU ARE A PASSENGER IN A CAR FOR AN HOUR WITHOUT A BREAK: SLIGHT CHANCE OF DOZING

## 2025-03-12 ENCOUNTER — VIRTUAL VISIT (OUTPATIENT)
Dept: SLEEP MEDICINE | Facility: CLINIC | Age: 60
End: 2025-03-12
Payer: COMMERCIAL

## 2025-03-12 DIAGNOSIS — G47.33 OSA (OBSTRUCTIVE SLEEP APNEA): Primary | ICD-10-CM

## 2025-03-12 PROCEDURE — 98006 SYNCH AUDIO-VIDEO EST MOD 30: CPT | Performed by: PHYSICIAN ASSISTANT

## 2025-03-12 NOTE — PROGRESS NOTES
Home Sleep Apnea Testing Results Visit:    Chief Complaint   Patient presents with    Study Results       Jose Raul Larsen is a 60 year old male who returns to [facility] after having had Home Sleep Apnea Testing.  He presented with nocturia, morning headaches,  some snoring, bruxism, daytime fatigue/sleepiness (ESS 5) and co-morbid HTN and CAD .    Home Sleep Apnea Testing - 1/23/25: 215 lbs 0 oz: AHI 10/hr. Supine AHI 20/hr.   Oxygen Archie of 84%.  Baseline 97%.  Sp02 =< 88% for 1.6 minutes  He slept on his back (35%), prone (0%), left (34%) and right (31%) sides.   Analysis time: 504 minutes.     Signal quality of Oxymeter 100% Good  Nasal Cannula 100% Good  RIP belts 100% Good.     Jose Raul Larsen reports that he slept Good .       Past medical/surgical history, family history, social history, medications and allergies were reviewed.      There were no vitals taken for this visit.    Impression/Plan:  Mild Obstructive Sleep Apnea.   Sleep associated hypoxemia was not present.     Treatment options discussed today including  auto-CPAP at 6-16 cmH2O, oral appliance therapy, positional therapy, polysomnography with full night PAP titration, or surgical options.    Elected treatment with oral appliance therapy.  Sleep dental referral placed.     Follow up as needed.     30 minutes spent on day of encounter doing chart review,  history and exam, counseling, coordinating plan of care, documentation and further activities as noted above.       Laura Elizabeth PA-C  Sleep Medicine

## 2025-06-02 SDOH — HEALTH STABILITY: PHYSICAL HEALTH: ON AVERAGE, HOW MANY MINUTES DO YOU ENGAGE IN EXERCISE AT THIS LEVEL?: 50 MIN

## 2025-06-02 SDOH — HEALTH STABILITY: PHYSICAL HEALTH: ON AVERAGE, HOW MANY DAYS PER WEEK DO YOU ENGAGE IN MODERATE TO STRENUOUS EXERCISE (LIKE A BRISK WALK)?: 7 DAYS

## 2025-06-02 ASSESSMENT — SOCIAL DETERMINANTS OF HEALTH (SDOH): HOW OFTEN DO YOU GET TOGETHER WITH FRIENDS OR RELATIVES?: ONCE A WEEK

## 2025-06-04 ENCOUNTER — OFFICE VISIT (OUTPATIENT)
Dept: INTERNAL MEDICINE | Facility: CLINIC | Age: 60
End: 2025-06-04
Payer: COMMERCIAL

## 2025-06-04 VITALS
BODY MASS INDEX: 29.26 KG/M2 | RESPIRATION RATE: 16 BRPM | DIASTOLIC BLOOD PRESSURE: 82 MMHG | TEMPERATURE: 98 F | WEIGHT: 209 LBS | SYSTOLIC BLOOD PRESSURE: 124 MMHG | HEIGHT: 71 IN | HEART RATE: 67 BPM | OXYGEN SATURATION: 98 %

## 2025-06-04 DIAGNOSIS — Z00.00 HEALTHCARE MAINTENANCE: Primary | ICD-10-CM

## 2025-06-04 DIAGNOSIS — I10 ESSENTIAL HYPERTENSION: ICD-10-CM

## 2025-06-04 DIAGNOSIS — Z23 NEED FOR PROPHYLACTIC VACCINATION AGAINST DIPHTHERIA AND TETANUS: ICD-10-CM

## 2025-06-04 DIAGNOSIS — I25.84 CORONARY ARTERY DISEASE DUE TO CALCIFIED CORONARY LESION: ICD-10-CM

## 2025-06-04 DIAGNOSIS — I25.10 CORONARY ARTERY DISEASE DUE TO CALCIFIED CORONARY LESION: ICD-10-CM

## 2025-06-04 DIAGNOSIS — G47.33 OSA (OBSTRUCTIVE SLEEP APNEA): ICD-10-CM

## 2025-06-04 DIAGNOSIS — R42 DIZZINESS: ICD-10-CM

## 2025-06-04 DIAGNOSIS — Z51.81 ENCOUNTER FOR THERAPEUTIC DRUG MONITORING: ICD-10-CM

## 2025-06-04 DIAGNOSIS — Z23 NEED FOR COVID-19 VACCINE: ICD-10-CM

## 2025-06-04 DIAGNOSIS — R97.20 ELEVATED PROSTATE SPECIFIC ANTIGEN (PSA): ICD-10-CM

## 2025-06-04 LAB
ALBUMIN SERPL BCG-MCNC: 4.3 G/DL (ref 3.5–5.2)
ALP SERPL-CCNC: 48 U/L (ref 40–150)
ALT SERPL W P-5'-P-CCNC: 37 U/L (ref 0–70)
ANION GAP SERPL CALCULATED.3IONS-SCNC: 7 MMOL/L (ref 7–15)
AST SERPL W P-5'-P-CCNC: 34 U/L (ref 0–45)
BILIRUB SERPL-MCNC: 1.6 MG/DL
BUN SERPL-MCNC: 18.1 MG/DL (ref 8–23)
CALCIUM SERPL-MCNC: 9.8 MG/DL (ref 8.8–10.4)
CHLORIDE SERPL-SCNC: 103 MMOL/L (ref 98–107)
CHOLEST SERPL-MCNC: 124 MG/DL
CREAT SERPL-MCNC: 1.21 MG/DL (ref 0.67–1.17)
EGFRCR SERPLBLD CKD-EPI 2021: 69 ML/MIN/1.73M2
ERYTHROCYTE [DISTWIDTH] IN BLOOD BY AUTOMATED COUNT: 12.9 % (ref 10–15)
FASTING STATUS PATIENT QL REPORTED: YES
FASTING STATUS PATIENT QL REPORTED: YES
GLUCOSE SERPL-MCNC: 92 MG/DL (ref 70–99)
HCO3 SERPL-SCNC: 30 MMOL/L (ref 22–29)
HCT VFR BLD AUTO: 45.8 % (ref 40–53)
HDLC SERPL-MCNC: 44 MG/DL
HGB BLD-MCNC: 16 G/DL (ref 13.3–17.7)
LDLC SERPL CALC-MCNC: 64 MG/DL
MCH RBC QN AUTO: 30.1 PG (ref 26.5–33)
MCHC RBC AUTO-ENTMCNC: 34.9 G/DL (ref 31.5–36.5)
MCV RBC AUTO: 86 FL (ref 78–100)
NONHDLC SERPL-MCNC: 80 MG/DL
PLATELET # BLD AUTO: 162 10E3/UL (ref 150–450)
POTASSIUM SERPL-SCNC: 4.8 MMOL/L (ref 3.4–5.3)
PROT SERPL-MCNC: 7 G/DL (ref 6.4–8.3)
PSA SERPL DL<=0.01 NG/ML-MCNC: 3.96 NG/ML (ref 0–4.5)
RBC # BLD AUTO: 5.31 10E6/UL (ref 4.4–5.9)
SODIUM SERPL-SCNC: 140 MMOL/L (ref 135–145)
TRIGL SERPL-MCNC: 78 MG/DL
WBC # BLD AUTO: 6 10E3/UL (ref 4–11)

## 2025-06-04 PROCEDURE — 80061 LIPID PANEL: CPT | Performed by: INTERNAL MEDICINE

## 2025-06-04 PROCEDURE — 3079F DIAST BP 80-89 MM HG: CPT | Performed by: INTERNAL MEDICINE

## 2025-06-04 PROCEDURE — 80053 COMPREHEN METABOLIC PANEL: CPT | Performed by: INTERNAL MEDICINE

## 2025-06-04 PROCEDURE — 90714 TD VACC NO PRESV 7 YRS+ IM: CPT | Performed by: INTERNAL MEDICINE

## 2025-06-04 PROCEDURE — 99396 PREV VISIT EST AGE 40-64: CPT | Mod: 25 | Performed by: INTERNAL MEDICINE

## 2025-06-04 PROCEDURE — 99214 OFFICE O/P EST MOD 30 MIN: CPT | Mod: 25 | Performed by: INTERNAL MEDICINE

## 2025-06-04 PROCEDURE — 36415 COLL VENOUS BLD VENIPUNCTURE: CPT | Performed by: INTERNAL MEDICINE

## 2025-06-04 PROCEDURE — 85027 COMPLETE CBC AUTOMATED: CPT | Performed by: INTERNAL MEDICINE

## 2025-06-04 PROCEDURE — 84153 ASSAY OF PSA TOTAL: CPT | Performed by: INTERNAL MEDICINE

## 2025-06-04 PROCEDURE — 90471 IMMUNIZATION ADMIN: CPT | Performed by: INTERNAL MEDICINE

## 2025-06-04 PROCEDURE — 91320 SARSCV2 VAC 30MCG TRS-SUC IM: CPT | Performed by: INTERNAL MEDICINE

## 2025-06-04 PROCEDURE — 3074F SYST BP LT 130 MM HG: CPT | Performed by: INTERNAL MEDICINE

## 2025-06-04 PROCEDURE — 90480 ADMN SARSCOV2 VAC 1/ONLY CMP: CPT | Performed by: INTERNAL MEDICINE

## 2025-06-04 RX ORDER — LOSARTAN POTASSIUM AND HYDROCHLOROTHIAZIDE 25; 100 MG/1; MG/1
TABLET ORAL
COMMUNITY
End: 2025-06-04 | Stop reason: DRUGHIGH

## 2025-06-04 RX ORDER — ROSUVASTATIN CALCIUM 40 MG/1
40 TABLET, COATED ORAL DAILY
Qty: 90 TABLET | Refills: 3 | Status: SHIPPED | OUTPATIENT
Start: 2025-06-04

## 2025-06-04 RX ORDER — LOSARTAN POTASSIUM 25 MG/1
TABLET ORAL
Qty: 270 TABLET | Refills: 3 | Status: SHIPPED | OUTPATIENT
Start: 2025-06-04

## 2025-06-04 NOTE — PROGRESS NOTES
Jose Raul Larsen   60 year old male    Date of Visit: 6/4/2025    Chief Complaint   Patient presents with    Physical     fasting     Subjective  60-year-old male lives independently with wife, Jazmyn.  Planning to travel to [place] in 10 days.    Overall he feels well.  He is active with walking 2-3 times a day.  Tends to the walk in the morning, at lunch and in the evening when he walks the dog.    Intermittently, not every time, he will feel some mild lightheadedness dizziness when he starts to walk, usually in the morning, never in the evening.    No chest pain or palpitations or other symptoms with that.  He has started to eat a protein bar when he walks, and that has lessened the symptoms considerably.  He feels he stays well-hydrated.    He is lost 6 pounds since last year.  Try to stay in shape.    Coronary artery disease with an MI during COVID August 2022 with a drug-eluting stent placed in the circumflex.  He had moderate RCA and LAD disease with that angiogram.    Has had no recurrent symptoms since.    No epigastric pain or bleeding on aspirin daily.  No generalized myalgias on rosuvastatin.  LDL was 31 last year.    Last summer we increased his evening losartan to 50 mg.  Still on 25 mg of losartan in the morning.    His blood pressure has been 116/72-1 30s/70-80 range.  He does not have orthostatic dizziness when not walking.    January 2025 sleep study showed mild sleep apnea.  But he decided not to use a CPAP machine.  He is using his oral appliance.  He denies significant daytime sleepiness.    No palpitations or history of arrhythmia.    No increasing shortness of breath or edema.    January 2024 CT scan of the chest showed a stable right upper lobe granuloma.  Negative tuberculosis testing in the past.    He is never smoked.  No cough or breathing symptoms currently.    No swallowing symptoms or heartburn.    He has continued urinary frequency with up to 4 times a night nocturia.  He was  evaluated by urology in January.  He had a mildly elevated PSA but relatively stable.  PSA was 3.5 last year but it was 3.3 in 2023.  No dysuria or gross hematuria.    Bowels are normal and no blood in stool.  No abdominal pain.    October 2018 colonoscopy was negative with a 10-year follow-up plan.    No musculoskeletal pain complaints.  History of left ulnar nerve transposition.    No headache complaints.    He did see ophthalmology within the past year and reports a normal eye exam, normal glaucoma screening.    Previous javelin thrower.  But no complaint of right shoulder tendinitis which she has had in the past.    No recent cough or febrile illness.    No rash or concerning moles.  No falls.    PMHx:    Past Medical History:   Diagnosis Date    CAD (coronary artery disease)     Dyslipidemia     Hypertension     Lung abscess (H) 08/21/2022    LINETTE (obstructive sleep apnea)     Percutaneous transluminal coronary angioplasty status 08/24/2022     PSHx:    Past Surgical History:   Procedure Laterality Date    CV CORONARY ANGIOGRAM N/A 08/24/2022    Procedure: CV CORONARY ANGIOGRAM;  Surgeon: Alize Ortega MD;  Location: [facility]    CV LEFT HEART CATH N/A 08/24/2022    Procedure: Left Heart Catheterization;  Surgeon: Alize Ortega MD;  Location: [facility]    CV PCI STENT DRUG ELUTING N/A 08/24/2022    Procedure: Percutaneous Coronary Intervention Stent;  Surgeon: Alize Ortega MD;  Location: [facility]    HERNIA REPAIR      SOFT TISSUE SURGERY      [facility] [place] APPENDECTOMY      Description: Appendectomy;  Recorded: 02/19/2010;     Immunizations:   Immunization History   Administered Date(s) Administered    COVID-19 12+ (Pfizer) 01/15/2024    COVID-19 Bivalent 12+ (Pfizer) 12/30/2022    COVID-19 MONOVALENT 12+ (Pfizer) 03/08/2021, 03/31/2021, 12/30/2021    DT (PEDS <7y) 07/30/2001    Flu, Unspecified 07/01/2024    Influenza (prior to 2024) 09/23/2011    Influenza Vaccine 18-64  "(Flublok) 01/15/2024    Influenza Vaccine >6 months,quad, PF 12/02/2022    Influenza Vaccine, 6+MO IM (QUADRIVALENT W/PRESERVATIVES) 09/28/2012    TDAP (Adacel,Boostrix) 02/19/2010    Td,adult,historic,unspecified 02/19/2010       ROS A comprehensive review of systems was performed and was otherwise negative    Medications, allergies, and problem list were reviewed and updated    Exam  BP (!) 135/92   Pulse 67   Temp 98  F (36.7  C)   Resp 16   Ht 1.81 m (5' 11.26\")   Wt 94.8 kg (209 lb)   SpO2 98%   BMI 28.94 kg/m      Blood pressure was 124/82 on my recheck  Healthy-appearing male.  Normal mood and affect.  Normal mobility.  Pupils and irises equal and reactive.  Extraocular muscles intact.  No jaundice or conjunctivitis.  External ears and nose exam was normal.  Minimal cerumen and normal tympanic membranes.  Pharynx is normal.  Teeth in good condition.  No cervical supraclavicular or axillary adenopathy.  No JVD and no carotid bruits.  No thyromegaly or nodularity to inspection and palpation.  Lungs clear to auscultation with good respiratory excursion.  Heart is regular with no murmur rub or gallop.  +1 pedal pulses bilaterally and no ankle edema and feet in good condition.  Abdomen is thin, nontender no hepatosplenomegaly.  No pulsatile abdominal mass.  Skin exam without suspicious skin lesions.  He declined  exam.     Assessment/Plan  1. Healthcare maintenance (Primary)   Main issue for patient is coronary artery disease.  He is in good physical condition with regular exercise and healthy diet.    Routine eye exam in the past year, continue every other year eye exams for glaucoma screening.    See patient instructions for health maintenance plan.     2. Coronary artery disease due to calcified coronary lesion  Asymptomatic.  Good exercise tolerance.    No evidence of medication toxicity.  I did discuss that if patient has any exertional symptoms such as chest pain, shortness of breath, shoulder pain " or back pain or dyspepsia, get evaluated right away.    Mild dizziness is intermittent with walking and has improved with eating a protein bar.  I suspect that is mild volume depletion.      - Lipid panel reflex to direct LDL Non-fasting  - rosuvastatin (CRESTOR) 40 MG tablet; Take 1 tablet (40 mg) by mouth daily.  Dispense: 90 tablet; Refill: 3    Goal LDL less than 80    3. Essential hypertension  Well-controlled on home checks.  Normal on my recheck.  Continue current losartan.  Contact me if blood pressure is getting outside of range or if increasing dizziness  - losartan (COZAAR) 25 MG tablet; Take 25 mg in the morning and 50 mg in the evening  Dispense: 270 tablet; Refill: 3    4. LINETTE (obstructive sleep apnea)  Mild.  Denies daytime sleepiness.  Has lost some weight.  He does not want to use CPAP.  He uses an oral appliance.    No history of heart failure or afibrillation    5. Elevated prostate specific antigen (PSA)  Mild PSA elevation but stable.  Urinary frequency likely irritable bladder  - PSA tumor marker    6. Need for prophylactic vaccination against diphtheria and tetanus  Given today  - TD,PF 7+(TENIVAC)    7. Encounter for therapeutic drug monitoring    - CBC with platelets  - Comprehensive metabolic panel    8. Need for COVID-19 vaccine  Given today, patient warned about immunization reaction with a tetanus shot at the same time but he excepted risk and wished to proceed with both vaccines today  - COVID-19 12+ (PFIZER)    9. Dizziness  Mild dizziness, likely volume depletion.  Rule out carotid artery stenosis with ultrasound, given known vascular disease.    Goal LDL less than  - US Carotid Bilateral; Future      Return in about 1 year (around 6/4/2026).   Patient Instructions   Mild dizziness as you walk may be some element of volume depletion.  Eating a protein bar and staying well-hydrated should help with that.    Goal blood pressure less than 135/85 but not less than 110/60.  Contact me if  "your blood pressure gets out of that range more than occasionally.  Contact me if your dizziness gets worse.    Schedule a carotid ultrasound sometime later this summer, to rule out carotid artery stenosis.    Continue regular physical activity.  Regular walking and exercise helps keep the heart and body healthy.    Your colonoscopy will be due October 2028    See me in 1 year for health maintenance exam.    Mio Gtz MD, MD        Current Outpatient Medications   Medication Sig Dispense Refill    aspirin (ASA) 81 MG chewable tablet Take 1 tablet (81 mg) by mouth daily Starting tomorrow. 30 tablet 3    famotidine (PEPCID) 40 MG tablet Take 40 mg by mouth as needed      fish oil-omega-3 fatty acids 1000 MG capsule Take 1 g by mouth 2 times daily (with meals) Take after lunch and dinner.      losartan (COZAAR) 25 MG tablet Take 25 mg in the morning and 50 mg in the evening 270 tablet 3    Misc Natural Products (OSTEO BI-FLEX JOINT SHIELD) TABS Take 1 tablet by mouth 2 times daily (with meals) Take after lunch and dinner.      nitroGLYcerin (NITROSTAT) 0.4 MG sublingual tablet One tablet under the tongue every 5 minutes if needed for chest pain. May repeat every 5 minutes for a maximum of 3 doses in 15 minutes\" 25 tablet 3    rosuvastatin (CRESTOR) 40 MG tablet Take 1 tablet (40 mg) by mouth daily. 90 tablet 3    vitamin D3 (CHOLECALCIFEROL) 50 mcg (2000 units) tablet Take 50 mcg by mouth every morning       Allergies   Allergen Reactions    Cephalexin Hives and Rash     Social History     Tobacco Use    Smoking status: Never     Passive exposure: Never    Smokeless tobacco: Never   Vaping Use    Vaping status: Never Used   Substance Use Topics    Alcohol use: Yes    Drug use: Never             Subjective   Jose Raul is a 60 year old, presenting for the following health issues:  Physical (fasting)        6/4/2025     8:41 AM   Additional Questions   Roomed by Alize RODRIGUEZ   Accompanied by emi JUSTICE                  " "  Objective    Pulse 67   Temp 98  F (36.7  C)   Resp 16   Ht 1.81 m (5' 11.26\")   Wt 94.8 kg (209 lb)   SpO2 98%   BMI 28.94 kg/m    Body mass index is 28.94 kg/m .  Physical Exam               Signed Electronically by: Mio Gtz MD    "

## 2025-06-04 NOTE — PATIENT INSTRUCTIONS
Mild dizziness as you walk may be some element of volume depletion.  Eating a protein bar and staying well-hydrated should help with that.    Goal blood pressure less than 135/85 but not less than 110/60.  Contact me if your blood pressure gets out of that range more than occasionally.  Contact me if your dizziness gets worse.    Schedule a carotid ultrasound sometime later this summer, to rule out carotid artery stenosis.    Continue regular physical activity.  Regular walking and exercise helps keep the heart and body healthy.    Your colonoscopy will be due October 2028    See me in 1 year for health maintenance exam.

## 2025-06-05 ENCOUNTER — RESULTS FOLLOW-UP (OUTPATIENT)
Dept: INTERNAL MEDICINE | Facility: CLINIC | Age: 60
End: 2025-06-05

## 2025-07-29 ENCOUNTER — TRANSFERRED RECORDS (OUTPATIENT)
Dept: HEALTH INFORMATION MANAGEMENT | Facility: CLINIC | Age: 60
End: 2025-07-29
Payer: COMMERCIAL

## 2025-08-05 ENCOUNTER — HOSPITAL ENCOUNTER (OUTPATIENT)
Dept: ULTRASOUND IMAGING | Facility: CLINIC | Age: 60
Discharge: HOME | End: 2025-08-05
Attending: INTERNAL MEDICINE
Payer: COMMERCIAL

## 2025-08-05 DIAGNOSIS — R42 DIZZINESS: ICD-10-CM

## 2025-08-05 PROCEDURE — 93880 EXTRACRANIAL BILAT STUDY: CPT

## 2025-08-07 ENCOUNTER — TRANSFERRED RECORDS (OUTPATIENT)
Dept: HEALTH INFORMATION MANAGEMENT | Facility: CLINIC | Age: 60
End: 2025-08-07
Payer: COMMERCIAL

## 2025-08-11 ENCOUNTER — OFFICE VISIT (OUTPATIENT)
Dept: INTERNAL MEDICINE | Facility: CLINIC | Age: 60
End: 2025-08-11
Payer: COMMERCIAL

## 2025-08-11 VITALS
BODY MASS INDEX: 30.1 KG/M2 | RESPIRATION RATE: 16 BRPM | DIASTOLIC BLOOD PRESSURE: 88 MMHG | SYSTOLIC BLOOD PRESSURE: 132 MMHG | HEIGHT: 71 IN | OXYGEN SATURATION: 97 % | HEART RATE: 64 BPM | TEMPERATURE: 98.1 F | WEIGHT: 215 LBS

## 2025-08-11 DIAGNOSIS — I25.10 CORONARY ARTERY DISEASE DUE TO CALCIFIED CORONARY LESION: ICD-10-CM

## 2025-08-11 DIAGNOSIS — I10 ESSENTIAL HYPERTENSION: ICD-10-CM

## 2025-08-11 DIAGNOSIS — G47.33 MILD OBSTRUCTIVE SLEEP APNEA: ICD-10-CM

## 2025-08-11 DIAGNOSIS — Z01.818 PREOP GENERAL PHYSICAL EXAM: Primary | ICD-10-CM

## 2025-08-11 DIAGNOSIS — M23.203 DEGENERATIVE TEAR OF MEDIAL MENISCUS, RIGHT: ICD-10-CM

## 2025-08-11 DIAGNOSIS — Z51.81 ENCOUNTER FOR THERAPEUTIC DRUG MONITORING: ICD-10-CM

## 2025-08-11 DIAGNOSIS — I25.84 CORONARY ARTERY DISEASE DUE TO CALCIFIED CORONARY LESION: ICD-10-CM

## 2025-08-11 LAB
ALBUMIN SERPL BCG-MCNC: 4.2 G/DL (ref 3.5–5.2)
ALP SERPL-CCNC: 45 U/L (ref 40–150)
ALT SERPL W P-5'-P-CCNC: 34 U/L (ref 0–70)
ANION GAP SERPL CALCULATED.3IONS-SCNC: 9 MMOL/L (ref 7–15)
AST SERPL W P-5'-P-CCNC: 35 U/L (ref 0–45)
ATRIAL RATE - MUSE: 60 BPM
BILIRUB SERPL-MCNC: 1.8 MG/DL
BUN SERPL-MCNC: 14.2 MG/DL (ref 8–23)
CALCIUM SERPL-MCNC: 9.8 MG/DL (ref 8.8–10.4)
CHLORIDE SERPL-SCNC: 105 MMOL/L (ref 98–107)
CREAT SERPL-MCNC: 1.2 MG/DL (ref 0.67–1.17)
DIASTOLIC BLOOD PRESSURE - MUSE: NORMAL MMHG
EGFRCR SERPLBLD CKD-EPI 2021: 69 ML/MIN/1.73M2
ERYTHROCYTE [DISTWIDTH] IN BLOOD BY AUTOMATED COUNT: 12.7 % (ref 10–15)
GLUCOSE SERPL-MCNC: 97 MG/DL (ref 70–99)
HCO3 SERPL-SCNC: 27 MMOL/L (ref 22–29)
HCT VFR BLD AUTO: 46.5 % (ref 40–53)
HGB BLD-MCNC: 16.4 G/DL (ref 13.3–17.7)
INTERPRETATION ECG - MUSE: NORMAL
MCH RBC QN AUTO: 30.6 PG (ref 26.5–33)
MCHC RBC AUTO-ENTMCNC: 35.3 G/DL (ref 31.5–36.5)
MCV RBC AUTO: 87 FL (ref 78–100)
P AXIS - MUSE: NORMAL DEGREES
PLATELET # BLD AUTO: 166 10E3/UL (ref 150–450)
POTASSIUM SERPL-SCNC: 4.8 MMOL/L (ref 3.4–5.3)
PR INTERVAL - MUSE: NORMAL MS
PROT SERPL-MCNC: 6.8 G/DL (ref 6.4–8.3)
QRS DURATION - MUSE: 82 MS
QT - MUSE: 416 MS
QTC - MUSE: 416 MS
R AXIS - MUSE: 4 DEGREES
RBC # BLD AUTO: 5.36 10E6/UL (ref 4.4–5.9)
SODIUM SERPL-SCNC: 141 MMOL/L (ref 135–145)
SYSTOLIC BLOOD PRESSURE - MUSE: NORMAL MMHG
T AXIS - MUSE: 28 DEGREES
VENTRICULAR RATE- MUSE: 60 BPM
WBC # BLD AUTO: 6.4 10E3/UL (ref 4–11)

## 2025-08-11 PROCEDURE — 93005 ELECTROCARDIOGRAM TRACING: CPT | Performed by: INTERNAL MEDICINE

## 2025-08-11 PROCEDURE — 93010 ELECTROCARDIOGRAM REPORT: CPT | Performed by: INTERNAL MEDICINE

## 2025-08-11 PROCEDURE — 85027 COMPLETE CBC AUTOMATED: CPT | Performed by: INTERNAL MEDICINE

## 2025-08-11 PROCEDURE — 3075F SYST BP GE 130 - 139MM HG: CPT | Performed by: INTERNAL MEDICINE

## 2025-08-11 PROCEDURE — G2211 COMPLEX E/M VISIT ADD ON: HCPCS | Performed by: INTERNAL MEDICINE

## 2025-08-11 PROCEDURE — 99214 OFFICE O/P EST MOD 30 MIN: CPT | Performed by: INTERNAL MEDICINE

## 2025-08-11 PROCEDURE — 80053 COMPREHEN METABOLIC PANEL: CPT | Performed by: INTERNAL MEDICINE

## 2025-08-11 PROCEDURE — 3079F DIAST BP 80-89 MM HG: CPT | Performed by: INTERNAL MEDICINE

## 2025-08-11 PROCEDURE — 36415 COLL VENOUS BLD VENIPUNCTURE: CPT | Performed by: INTERNAL MEDICINE

## 2025-08-27 ENCOUNTER — TRANSFERRED RECORDS (OUTPATIENT)
Dept: HEALTH INFORMATION MANAGEMENT | Facility: CLINIC | Age: 60
End: 2025-08-27
Payer: COMMERCIAL

## (undated) DEVICE — Device

## (undated) DEVICE — SHEATH GLIDE RADIAL 6FR 25CM .035

## (undated) DEVICE — SHEATH GLIDE RADIAL 4FR 25CM 0.021

## (undated) DEVICE — CATH BALLOON EMERGE 3.0X20MM H7493918920300

## (undated) DEVICE — MANIFOLD KIT ANGIO AUTOMATED 014613

## (undated) DEVICE — ELECTRODE ADULT PACING MULTI P-211-M1

## (undated) DEVICE — CATH BALLOON NC EMERGE 4.50X15MM H7493926715450

## (undated) DEVICE — CATH ANGIO INFINITI JL3.5 4FRX100CM 538418

## (undated) DEVICE — DEVICE INFLATION SYR W/ HEMOSTASIS VALVE 12IN EXT IN4904

## (undated) DEVICE — SYR ANGIOGRAPHY MULTIUSE KIT ACIST 014612

## (undated) DEVICE — SLEEVE TR BAND RADIAL COMPRESSION DEVICE 24CM TRB24-REG

## (undated) DEVICE — EXCHANGE WIRE .035 260 STAR/JFC/035/260/ M001491681

## (undated) DEVICE — CATH DIAG 4FR JR 5.0 538423

## (undated) DEVICE — KIT HAND CONTROL ACIST 016795

## (undated) DEVICE — CATH BALLOON EMERGE 1.5X12MM H7493918912150

## (undated) DEVICE — CUSTOM PACK CORONARY SAN5BCRHEA

## (undated) DEVICE — INTRO MICRO MINI STICK 4FR STD NITINOL

## (undated) DEVICE — GUIDEWIRE FORTE FLOPPY J TOP 34949-05J

## (undated) DEVICE — CATH LAUNCHER 6FR EBU 3.5 LA6EBU35

## (undated) RX ORDER — FENTANYL CITRATE 50 UG/ML
INJECTION, SOLUTION INTRAMUSCULAR; INTRAVENOUS
Status: DISPENSED
Start: 2022-08-24